# Patient Record
Sex: FEMALE | Race: WHITE | Employment: OTHER | URBAN - METROPOLITAN AREA
[De-identification: names, ages, dates, MRNs, and addresses within clinical notes are randomized per-mention and may not be internally consistent; named-entity substitution may affect disease eponyms.]

---

## 2018-03-27 ENCOUNTER — HOSPITAL ENCOUNTER (OUTPATIENT)
Dept: ULTRASOUND IMAGING | Age: 68
Discharge: HOME | End: 2018-03-27
Payer: MEDICARE

## 2018-03-27 ENCOUNTER — HOSPITAL ENCOUNTER (OUTPATIENT)
Dept: MAMMOGRAPHY | Age: 68
Discharge: HOME | End: 2018-03-29
Payer: MEDICARE

## 2018-03-27 DIAGNOSIS — R92.2 DENSE BREASTS: ICD-10-CM

## 2018-03-27 DIAGNOSIS — Z12.31 VISIT FOR SCREENING MAMMOGRAM: ICD-10-CM

## 2018-03-27 PROCEDURE — 77063 BREAST TOMOSYNTHESIS BI: CPT

## 2018-03-27 PROCEDURE — 76641 ULTRASOUND BREAST COMPLETE: CPT

## 2019-06-12 ENCOUNTER — HOSPITAL ENCOUNTER (OUTPATIENT)
Dept: MAMMOGRAPHY | Age: 69
Discharge: HOME | End: 2019-06-14
Payer: MEDICARE

## 2019-06-12 DIAGNOSIS — Z12.31 SCREENING MAMMOGRAM, ENCOUNTER FOR: ICD-10-CM

## 2019-06-12 PROCEDURE — 77067 SCR MAMMO BI INCL CAD: CPT

## 2019-09-16 ENCOUNTER — HOSPITAL ENCOUNTER (OUTPATIENT)
Dept: ULTRASOUND IMAGING | Age: 69
Discharge: HOME | End: 2019-09-16
Payer: MEDICARE

## 2019-09-16 DIAGNOSIS — K42.9 UMBILICAL HERNIA WITHOUT OBSTRUCTION AND WITHOUT GANGRENE: ICD-10-CM

## 2019-09-16 PROCEDURE — 76705 ECHO EXAM OF ABDOMEN: CPT

## 2019-09-24 ENCOUNTER — HOSPITAL ENCOUNTER (OUTPATIENT)
Age: 69
Discharge: HOME | End: 2019-09-24
Payer: MEDICARE

## 2019-09-24 LAB
ALBUMIN SERPL-MCNC: 4.7 G/DL (ref 3.5–5.2)
ALP BLD-CCNC: 91 U/L (ref 35–104)
ALT SERPL-CCNC: 22 U/L (ref 0–32)
ANION GAP SERPL CALCULATED.3IONS-SCNC: 12 MMOL/L (ref 7–16)
AST SERPL-CCNC: 18 U/L (ref 0–31)
BASOPHILS ABSOLUTE: 0.06 E9/L (ref 0–0.2)
BASOPHILS RELATIVE PERCENT: 1.1 % (ref 0–2)
BILIRUB SERPL-MCNC: 0.4 MG/DL (ref 0–1.2)
BUN BLDV-MCNC: 13 MG/DL (ref 8–23)
CALCIUM SERPL-MCNC: 9.4 MG/DL (ref 8.6–10.2)
CHLORIDE BLD-SCNC: 105 MMOL/L (ref 98–107)
CHOLESTEROL, TOTAL: 165 MG/DL (ref 0–199)
CO2: 27 MMOL/L (ref 22–29)
CREAT SERPL-MCNC: 0.8 MG/DL (ref 0.5–1)
EOSINOPHILS ABSOLUTE: 0.39 E9/L (ref 0.05–0.5)
EOSINOPHILS RELATIVE PERCENT: 7.1 % (ref 0–6)
GFR AFRICAN AMERICAN: >60
GFR NON-AFRICAN AMERICAN: >60 ML/MIN/1.73
GLUCOSE BLD-MCNC: 109 MG/DL (ref 74–99)
HCT VFR BLD CALC: 43.1 % (ref 34–48)
HDLC SERPL-MCNC: 56 MG/DL
HEMOGLOBIN: 13.8 G/DL (ref 11.5–15.5)
IMMATURE GRANULOCYTES #: 0.02 E9/L
IMMATURE GRANULOCYTES %: 0.4 % (ref 0–5)
LDL CHOLESTEROL CALCULATED: 93 MG/DL (ref 0–99)
LYMPHOCYTES ABSOLUTE: 1.31 E9/L (ref 1.5–4)
LYMPHOCYTES RELATIVE PERCENT: 23.7 % (ref 20–42)
MCH RBC QN AUTO: 29.7 PG (ref 26–35)
MCHC RBC AUTO-ENTMCNC: 32 % (ref 32–34.5)
MCV RBC AUTO: 92.7 FL (ref 80–99.9)
MONOCYTES ABSOLUTE: 0.48 E9/L (ref 0.1–0.95)
MONOCYTES RELATIVE PERCENT: 8.7 % (ref 2–12)
NEUTROPHILS ABSOLUTE: 3.27 E9/L (ref 1.8–7.3)
NEUTROPHILS RELATIVE PERCENT: 59 % (ref 43–80)
PDW BLD-RTO: 13.3 FL (ref 11.5–15)
PLATELET # BLD: 227 E9/L (ref 130–450)
PMV BLD AUTO: 11.6 FL (ref 7–12)
POTASSIUM SERPL-SCNC: 4.6 MMOL/L (ref 3.5–5)
RBC # BLD: 4.65 E12/L (ref 3.5–5.5)
SODIUM BLD-SCNC: 144 MMOL/L (ref 132–146)
TOTAL PROTEIN: 7.4 G/DL (ref 6.4–8.3)
TRIGL SERPL-MCNC: 80 MG/DL (ref 0–149)
TSH SERPL DL<=0.05 MIU/L-ACNC: 0.01 UIU/ML (ref 0.27–4.2)
VITAMIN D 25-HYDROXY: 31 NG/ML (ref 30–100)
VLDLC SERPL CALC-MCNC: 16 MG/DL
WBC # BLD: 5.5 E9/L (ref 4.5–11.5)

## 2019-09-24 PROCEDURE — 84443 ASSAY THYROID STIM HORMONE: CPT

## 2019-09-24 PROCEDURE — 82306 VITAMIN D 25 HYDROXY: CPT

## 2019-09-24 PROCEDURE — 85025 COMPLETE CBC W/AUTO DIFF WBC: CPT

## 2019-09-24 PROCEDURE — 80061 LIPID PANEL: CPT

## 2019-09-24 PROCEDURE — 80053 COMPREHEN METABOLIC PANEL: CPT

## 2019-09-24 PROCEDURE — 36415 COLL VENOUS BLD VENIPUNCTURE: CPT

## 2019-09-27 ENCOUNTER — HOSPITAL ENCOUNTER (OUTPATIENT)
Dept: CT IMAGING | Age: 69
Discharge: HOME | End: 2019-09-27
Payer: MEDICARE

## 2019-09-27 DIAGNOSIS — K46.9 NON-RECURRENT ABDOMINAL HERNIA WITHOUT OBSTRUCTION OR GANGRENE, UNSPECIFIED HERNIA TYPE: ICD-10-CM

## 2019-09-27 PROCEDURE — 6360000004 HC RX CONTRAST MEDICATION: Performed by: RADIOLOGY

## 2019-09-27 PROCEDURE — 74178 CT ABD&PLV WO CNTR FLWD CNTR: CPT

## 2019-09-27 RX ADMIN — IOPAMIDOL 80 ML: 755 INJECTION, SOLUTION INTRAVENOUS at 10:07

## 2019-09-27 RX ADMIN — IOHEXOL 50 ML: 240 INJECTION, SOLUTION INTRATHECAL; INTRAVASCULAR; INTRAVENOUS; ORAL at 10:06

## 2020-07-21 ENCOUNTER — HOSPITAL ENCOUNTER (OUTPATIENT)
Dept: MAMMOGRAPHY | Age: 70
Discharge: HOME | End: 2020-07-23
Payer: MEDICARE

## 2020-07-21 PROCEDURE — 77067 SCR MAMMO BI INCL CAD: CPT

## 2021-05-12 ENCOUNTER — TELEPHONE (OUTPATIENT)
Dept: SURGERY | Age: 71
End: 2021-05-12

## 2021-05-12 ENCOUNTER — OFFICE VISIT (OUTPATIENT)
Dept: SURGERY | Age: 71
End: 2021-05-12
Payer: MEDICARE

## 2021-05-12 VITALS
DIASTOLIC BLOOD PRESSURE: 89 MMHG | SYSTOLIC BLOOD PRESSURE: 147 MMHG | RESPIRATION RATE: 16 BRPM | WEIGHT: 167 LBS | TEMPERATURE: 97.8 F | BODY MASS INDEX: 26.21 KG/M2 | HEIGHT: 67 IN | HEART RATE: 68 BPM

## 2021-05-12 DIAGNOSIS — R19.4 CHANGE IN BOWEL HABITS: ICD-10-CM

## 2021-05-12 DIAGNOSIS — K21.9 GASTROESOPHAGEAL REFLUX DISEASE, UNSPECIFIED WHETHER ESOPHAGITIS PRESENT: Primary | ICD-10-CM

## 2021-05-12 PROCEDURE — 4004F PT TOBACCO SCREEN RCVD TLK: CPT | Performed by: SURGERY

## 2021-05-12 PROCEDURE — 4040F PNEUMOC VAC/ADMIN/RCVD: CPT | Performed by: SURGERY

## 2021-05-12 PROCEDURE — G8417 CALC BMI ABV UP PARAM F/U: HCPCS | Performed by: SURGERY

## 2021-05-12 PROCEDURE — 3017F COLORECTAL CA SCREEN DOC REV: CPT | Performed by: SURGERY

## 2021-05-12 PROCEDURE — G8427 DOCREV CUR MEDS BY ELIG CLIN: HCPCS | Performed by: SURGERY

## 2021-05-12 PROCEDURE — 1090F PRES/ABSN URINE INCON ASSESS: CPT | Performed by: SURGERY

## 2021-05-12 PROCEDURE — 99204 OFFICE O/P NEW MOD 45 MIN: CPT | Performed by: SURGERY

## 2021-05-12 PROCEDURE — 1123F ACP DISCUSS/DSCN MKR DOCD: CPT | Performed by: SURGERY

## 2021-05-12 PROCEDURE — G8400 PT W/DXA NO RESULTS DOC: HCPCS | Performed by: SURGERY

## 2021-05-12 RX ORDER — LORATADINE 10 MG/1
10 CAPSULE, LIQUID FILLED ORAL DAILY
COMMUNITY

## 2021-05-12 RX ORDER — LOSARTAN POTASSIUM 25 MG/1
TABLET ORAL
COMMUNITY
Start: 2021-03-29

## 2021-05-12 NOTE — PROGRESS NOTES
Patient's Name/Date of Birth: Tom Lack / 1950    Date: 5/12/2021    PCP: Attila Chen MD    Chief Complaint   Patient presents with    Colonoscopy     screening     Other     EGD consult        HPI:  Patient seen for evaluation of 2 problems: 1. GERD symptoms foe about one year duration. 2. Recent change in bowel habits with recent change in stool caliber, no rectal bleeding. Patient's medications, allergies, past medical, surgical, social and family histories were reviewed and updated as appropriate. Allergies   Allergen Reactions    Pcn [Penicillins]     Tape Bernell Colla Tape]        Past Medical History:   Diagnosis Date    [place] disease ([facility] [address]     Thyroid disease         Past Surgical History:   Procedure Laterality Date    COLONOSCOPY      THYROID SURGERY          Social History     Tobacco Use    Smoking status: Never Smoker    Smokeless tobacco: Never Used   Substance Use Topics    Alcohol use: Yes     Comment: socially       Current Outpatient Medications   Medication Sig Dispense Refill    losartan (COZAAR) 25 MG tablet take 1 tablet by mouth once daily      loratadine (CLARITIN) 10 MG capsule Take 10 mg by mouth daily      Coenzyme Q10 (COQ10) 100 MG CAPS Take by mouth      Ascorbic Acid (VITAMIN C) 500 MG tablet Take 500 mg by mouth daily      Pyridoxine HCl (VITAMIN B-6) 100 MG tablet Take 100 mg by mouth daily.  vitamin B-12 (CYANOCOBALAMIN) 100 MCG tablet Take 50 mcg by mouth daily.  vitamin E 1000 UNITS capsule Take 1,000 Units by mouth daily.  calcium-vitamin D (OSCAL) 250-125 MG-UNIT per tablet Take 1 tablet by mouth daily.  levothyroxine (SYNTHROID) 125 MCG tablet Take 125 mcg by mouth Daily        No current facility-administered medications for this visit.         Review of Systems  Constitutional: negative  Eyes: negative  Ears, nose, mouth, throat, and face: negative  Respiratory: negative  Cardiovascular: negative Gastrointestinal: negative  Genitourinary:negative  Integument/breast: negative  Hematologic/lymphatic: negative  Musculoskeletal:negative  Neurological: negative  Allergic/Immunologic: negative    Physical exam:  BP (!) 147/89   Pulse 68   Temp 97.8 °F (36.6 °C) (Temporal)   Resp 16   Ht 5' 6.5\" (1.689 m)   Wt 167 lb (75.8 kg)   BMI 26.55 kg/m²   General appearance: no acute distress  Head:NCAT, EOMI, PERRLA, conjunctiva pink  Neck: no masses, supple  Lungs: CTABL  Heart: RRR  Abdomen: soft, nondistended, nontender, no guarding, no peritoneal signs, normoactive bowel sounds  Extremities:no edema  Neuro exam: normal  Skin: no lesions, no rashes    Assessment/Plan:  Proceed with panendoscopy  The procedure risks, benfits, possible complications and alternative options where explained to the patient, she understands and agrees to proceed with surgery. .    No follow-ups on file.     Korina Olmos MD      Send copy of H&P to PCP, Kimberly Cordero MD

## 2021-05-12 NOTE — TELEPHONE ENCOUNTER
Prior Authorization Form:      DEMOGRAPHICS:                     Patient Name:  Shaun Meehan  Patient :  1950            Insurance:  Payor: MEDICARE / Plan: MEDICARE PART A AND B / Product Type: *No Product type* /   Insurance ID Number:    Payor/Plan Subscr  Sex Relation Sub. Ins. ID Effective Group Num   1. MEDICARE - [place]* 1950 Female Self [id number] 20                                    [address] [address]   2.  [facility]* 1950 Female Self [id number]                                   [address]         DIAGNOSIS & PROCEDURE:                       Procedure/Operation: EGD/Colonoscopy           CPT Code: 35576/79269    Diagnosis:  Change in bowel habits/GERD    ICD10 Code: K21.9/R19.4    Location:  [facility]    Surgeon:  Melvi Rodriguez    SCHEDULING INFORMATION:                          Date: 21    Time: 7:30              Anesthesia:  MAC/TIVA                                                       Status:  Outpatient        Special Comments:         Electronically signed by Meño Thomas MA on 2021 at 3:09 PM

## 2021-05-24 ENCOUNTER — HOSPITAL ENCOUNTER (OUTPATIENT)
Age: 71
Discharge: HOME | End: 2021-05-26
Payer: MEDICARE

## 2021-05-24 DIAGNOSIS — Z01.818 PREOP TESTING: ICD-10-CM

## 2021-05-24 PROCEDURE — U0003 INFECTIOUS AGENT DETECTION BY NUCLEIC ACID (DNA OR RNA); SEVERE ACUTE RESPIRATORY SYNDROME CORONAVIRUS 2 (SARS-COV-2) (CORONAVIRUS DISEASE [COVID-19]), AMPLIFIED PROBE TECHNIQUE, MAKING USE OF HIGH THROUGHPUT TECHNOLOGIES AS DESCRIBED BY CMS-2020-01-R: HCPCS

## 2021-05-24 PROCEDURE — U0005 INFEC AGEN DETEC AMPLI PROBE: HCPCS

## 2021-05-25 NOTE — PROGRESS NOTES

## 2021-05-25 NOTE — PROGRESS NOTES
[facility] PRE-ADMISSION TESTING INSTRUCTIONS      ARRIVAL INSTRUCTIONS:  [x] Parking the day of Surgery is located in the Main Entrance lot. Upon entering the main door make an immediate right to the surgery reception desk. [x] Bring photo ID and insurance card    [] Bring in a copy of Living will or Durable Power of  papers. [x] Please be sure to arrange for responsible adult to provide transportation to and from the hospital    [x] Please arrange for responsible adult to be with you for the 24 hour period post procedure due to having anesthesia      GENERAL INSTRUCTIONS:    [x] Nothing by mouth after midnight, including gum, candy, mints or water    [x] You may brush your teeth, but do not swallow any water    [] Take medications as instructed with 1-2 oz of water    [x] Stop herbal supplements and vitamins 5 days prior to procedure    [] Follow preop dosing of blood thinners per physician instructions    [] Take 1/2 dose of evening insulin, but no insulin after midnight    [] No oral diabetic medications after midnight    [] If diabetic and have low blood sugar or feel symptomatic, take 1-2oz apple juice only    [] Bring inhalers day of surgery    [] Bring C-PAP/ Bi-Pap day of surgery    [] Bring urine specimen day of surgery    [] Shower or bath with soap, lather and rinse well, AM of Surgery, no lotion, powders or creams to surgical site    [x] Follow bowel prep as instructed per surgeon    [] No tobacco products within 24 hours of surgery     [x] No alcohol or illegal drug use within 24 hours of surgery.     [x] Jewelry, body piercing's, eyeglasses, contact lenses and dentures are not permitted into surgery (bring cases)      [x] Please do not wear any nail polish, make up or hair products on the day of surgery    [x] You can expect a call the business day prior to procedure to notify you if your arrival time changes    [x] If you receive a survey after surgery we

## 2021-05-26 LAB
SARS-COV-2: NOT DETECTED
SOURCE: NORMAL

## 2021-05-28 ENCOUNTER — HOSPITAL ENCOUNTER (OUTPATIENT)
Age: 71
Setting detail: OUTPATIENT SURGERY
Discharge: HOME | End: 2021-05-28
Attending: SURGERY | Admitting: SURGERY
Payer: MEDICARE

## 2021-05-28 ENCOUNTER — ANESTHESIA EVENT (OUTPATIENT)
Dept: ENDOSCOPY | Age: 71
End: 2021-05-28
Payer: MEDICARE

## 2021-05-28 ENCOUNTER — ANESTHESIA (OUTPATIENT)
Dept: ENDOSCOPY | Age: 71
End: 2021-05-28
Payer: MEDICARE

## 2021-05-28 VITALS
HEART RATE: 71 BPM | RESPIRATION RATE: 16 BRPM | TEMPERATURE: 98.5 F | BODY MASS INDEX: 26.2 KG/M2 | SYSTOLIC BLOOD PRESSURE: 127 MMHG | WEIGHT: 163 LBS | DIASTOLIC BLOOD PRESSURE: 75 MMHG | HEIGHT: 66 IN | OXYGEN SATURATION: 95 %

## 2021-05-28 VITALS
DIASTOLIC BLOOD PRESSURE: 59 MMHG | SYSTOLIC BLOOD PRESSURE: 104 MMHG | RESPIRATION RATE: 20 BRPM | OXYGEN SATURATION: 91 %

## 2021-05-28 DIAGNOSIS — Z01.818 PREOP TESTING: Primary | ICD-10-CM

## 2021-05-28 PROCEDURE — 7100000011 HC PHASE II RECOVERY - ADDTL 15 MIN: Performed by: SURGERY

## 2021-05-28 PROCEDURE — 3700000001 HC ADD 15 MINUTES (ANESTHESIA): Performed by: SURGERY

## 2021-05-28 PROCEDURE — 45378 DIAGNOSTIC COLONOSCOPY: CPT | Performed by: SURGERY

## 2021-05-28 PROCEDURE — 88342 IMHCHEM/IMCYTCHM 1ST ANTB: CPT

## 2021-05-28 PROCEDURE — 3609027000 HC COLONOSCOPY: Performed by: SURGERY

## 2021-05-28 PROCEDURE — 7100000010 HC PHASE II RECOVERY - FIRST 15 MIN: Performed by: SURGERY

## 2021-05-28 PROCEDURE — 43239 EGD BIOPSY SINGLE/MULTIPLE: CPT | Performed by: SURGERY

## 2021-05-28 PROCEDURE — 3700000000 HC ANESTHESIA ATTENDED CARE: Performed by: SURGERY

## 2021-05-28 PROCEDURE — 2580000003 HC RX 258: Performed by: SURGERY

## 2021-05-28 PROCEDURE — 88305 TISSUE EXAM BY PATHOLOGIST: CPT

## 2021-05-28 PROCEDURE — 2709999900 HC NON-CHARGEABLE SUPPLY: Performed by: SURGERY

## 2021-05-28 PROCEDURE — 6360000002 HC RX W HCPCS: Performed by: NURSE ANESTHETIST, CERTIFIED REGISTERED

## 2021-05-28 PROCEDURE — 3609012400 HC EGD TRANSORAL BIOPSY SINGLE/MULTIPLE: Performed by: SURGERY

## 2021-05-28 RX ORDER — SODIUM CHLORIDE 0.9 % (FLUSH) 0.9 %
5-40 SYRINGE (ML) INJECTION PRN
Status: DISCONTINUED | OUTPATIENT
Start: 2021-05-28 | End: 2021-05-28 | Stop reason: HOSPADM

## 2021-05-28 RX ORDER — SODIUM CHLORIDE 0.9 % (FLUSH) 0.9 %
5-40 SYRINGE (ML) INJECTION EVERY 12 HOURS SCHEDULED
Status: DISCONTINUED | OUTPATIENT
Start: 2021-05-28 | End: 2021-05-28 | Stop reason: HOSPADM

## 2021-05-28 RX ORDER — SODIUM CHLORIDE 9 MG/ML
INJECTION, SOLUTION INTRAVENOUS CONTINUOUS
Status: DISCONTINUED | OUTPATIENT
Start: 2021-05-28 | End: 2021-05-28 | Stop reason: HOSPADM

## 2021-05-28 RX ORDER — SODIUM CHLORIDE 9 MG/ML
25 INJECTION, SOLUTION INTRAVENOUS PRN
Status: DISCONTINUED | OUTPATIENT
Start: 2021-05-28 | End: 2021-05-28 | Stop reason: HOSPADM

## 2021-05-28 RX ORDER — PROPOFOL 10 MG/ML
INJECTION, EMULSION INTRAVENOUS PRN
Status: DISCONTINUED | OUTPATIENT
Start: 2021-05-28 | End: 2021-05-28 | Stop reason: SDUPTHER

## 2021-05-28 RX ADMIN — PROPOFOL 440 MG: 10 INJECTION, EMULSION INTRAVENOUS at 07:37

## 2021-05-28 RX ADMIN — SODIUM CHLORIDE: 9 INJECTION, SOLUTION INTRAVENOUS at 07:23

## 2021-05-28 ASSESSMENT — PAIN - FUNCTIONAL ASSESSMENT: PAIN_FUNCTIONAL_ASSESSMENT: 0-10

## 2021-05-28 ASSESSMENT — PAIN SCALES - GENERAL
PAINLEVEL_OUTOF10: 0
PAINLEVEL_OUTOF10: 0

## 2021-05-28 NOTE — BRIEF OP NOTE
Brief Postoperative Note      Patient: Tyler Meehan  YOB: 1950  MRN: [id number]    Date of Procedure: 5/28/2021    Pre-Op Diagnosis: CHANGE IN BOWEL HABITS AND GERD    Post-Op Diagnosis: Same       Procedure(s):  EGD BIOPSY  COLONOSCOPY DIAGNOSTIC    Surgeon(s):  Mahi Garnica MD    Assistant:  * No surgical staff found *    Anesthesia: Monitor Anesthesia Care    Estimated Blood Loss (mL): Minimal    Complications: None    Specimens:   ID Type Source Tests Collected by Time Destination   A : r/o hpylori Tissue Stomach SURGICAL PATHOLOGY Mahi Garnica MD 5/28/2021 7644        Implants:  * No implants in log *      Drains: * No LDAs found *    Findings:     Electronically signed by Mahi Garnica MD on 5/28/2021 at 8:10 AM

## 2021-05-28 NOTE — OP NOTE
[address] [address]                                OPERATIVE REPORT    PATIENT NAME: Gabrielle JEFFERSON               :        1950  MED REC NO:   [id number]                            ROOM:  ACCOUNT NO:   [de-identified]                           ADMIT DATE: 2021  PROVIDER:     Korina Olmos MD    DATE OF PROCEDURE:  2021    PREOPERATIVE DIAGNOSES:  1. Heartburn. 2.  Recent change in bowel habits. POSTOPERATIVE DIAGNOSES:  1. Gastroesophageal reflux disease. 2.  Subcutaneous lipoma, antrum of the stomach. 3.  Sigmoid diverticulosis. PROCEDURE PERFORMED:  1. Esophagogastroduodenoscopy with antral biopsies. 2.  Total colonoscopy to cecum. ESTIMATED BLOOD LOSS:  Minimal.    DESCRIPTION OF PROCEDURE:  With the patient in the left lateral position  on the operating table, after adequate sedation was administered by  Anesthesia, a standard Olympus gastroscope was introduced through the  mouth and advanced into the esophagus. There was evidence of  gastroesophageal reflux. No esophageal ulcers, no stricture, no  Kamara's. GE junction was normal.  Stomach revealed normal gastric  folds. Fundus of the stomach was visualized by retroflexing the end of  the scope and was normal.  Body was normal as well. Antrum revealed a 1  cm subcutaneous bulge consistent with a subcutaneous lipoma along with  mild antral gastritis. Multiple biopsies were taken. Pyloric opening  was normal.  Visualization of the first, second, third, and fourth parts  of the duodenum showed no evidence of ulcers, bleeding, or other  pathology. Scope was slowly withdrawn and totally removed. The patient  tolerated the procedure well. Digital rectal examination and dilatation was performed, showed evidence  of good sphincter tone. There were no palpable masses and no blood on  the examining finger.   A pediatric Olympus colonoscope was introduced  through the anal opening and advanced into the rectosigmoid. The anus  and rectum were normal.  Sigmoid colon showed evidence of diverticulosis  without evidence of diverticulitis. Remainder of the left colon  visualized was normal.  Transverse colon, ascending colon, and cecum  were reached, visualized, and were normal.  Photos were taken. Scope  was slowly withdrawn. Further observation of the colon on the way out  revealed no other pathology. Scope was totally removed. The patient  tolerated the procedure well. RECOMMENDATIONS:  1. Antireflux precautions. 2.  High-fiber diet. 3.  Repeat colonoscopy in 10 years or earlier if indicated.         Susan Booth MD    D: 05/28/2021 8:28:22       T: 05/28/2021 8:38:36     [email or web address]  Job#: [id number]     Doc#: [id number]    CC:

## 2021-05-28 NOTE — ANESTHESIA PRE PROCEDURE
Department of Anesthesiology  Preprocedure Note       Name:  Heather Meehan   Age:  66 y.o.  :  1950                                          MRN:  [id number]         Date:  2021      Surgeon: Raphael Rai):  [place] Olivia MD    Procedure: Procedure(s):  EGD ESOPHAGOGASTRODUODENOSCOPY  COLONOSCOPY DIAGNOSTIC    Medications prior to admission:   Prior to Admission medications    Medication Sig Start Date End Date Taking? Authorizing Provider   losartan (COZAAR) 25 MG tablet take 1 tablet by mouth once daily 3/29/21  Yes Historical Provider, MD   loratadine (CLARITIN) 10 MG capsule Take 10 mg by mouth daily   Yes Historical Provider, MD   Coenzyme Q10 (COQ10) 100 MG CAPS Take by mouth   Yes Historical Provider, MD   Ascorbic Acid (VITAMIN C) 500 MG tablet Take 500 mg by mouth daily   Yes Historical Provider, MD   Pyridoxine HCl (VITAMIN B-6) 100 MG tablet Take 100 mg by mouth daily. Yes Historical Provider, MD   vitamin B-12 (CYANOCOBALAMIN) 100 MCG tablet Take 50 mcg by mouth daily. Yes Historical Provider, MD   vitamin E 1000 UNITS capsule Take 1,000 Units by mouth daily. Yes Historical Provider, MD   calcium-vitamin D (OSCAL) 250-125 MG-UNIT per tablet Take 1 tablet by mouth daily. Yes Historical Provider, MD   levothyroxine (SYNTHROID) 125 MCG tablet Take 125 mcg by mouth Daily    Yes Historical Provider, MD       Current medications:    Current Facility-Administered Medications   Medication Dose Route Frequency Provider Last Rate Last Admin    0.9 % sodium chloride infusion   Intravenous Continuous Florida Olivia MD        sodium chloride flush 0.9 % injection 5-40 mL  5-40 mL Intravenous 2 times per day Florida Olivia MD        sodium chloride flush 0.9 % injection 5-40 mL  5-40 mL Intravenous PRN Florida Olivia MD        0.9 % sodium chloride infusion  25 mL Intravenous PRN Florida Olivia MD           Allergies:     Allergies   Allergen Reactions    Pcn [Penicillins] Swelling    Tape Kj Dryer Tape] Rash       Problem List:  There is no problem list on file for this patient. Past Medical History:        Diagnosis Date    Colon cancer screening     GERD (gastroesophageal reflux disease)     [place] disease ([address]     Thyroid disease        Past Surgical History:        Procedure Laterality Date    APPENDECTOMY      COLONOSCOPY      HERNIA REPAIR      HYSTERECTOMY      THYROID SURGERY         Social History:    Social History     Tobacco Use    Smoking status: Never Smoker    Smokeless tobacco: Never Used   Substance Use Topics    Alcohol use: Yes     Comment: socially                                Counseling given: Not Answered      Vital Signs (Current):   Vitals:    05/25/21 0823 05/28/21 0649   BP:  (!) 161/83   Pulse:  85   Resp:  16   Temp:  98.5 °F (36.9 °C)   TempSrc:  Temporal   SpO2:  98%   Weight: 163 lb (73.9 kg)    Height: 5' 6\" (1.676 m)                                               BP Readings from Last 3 Encounters:   05/28/21 (!) 161/83   05/12/21 (!) 147/89   09/09/17 (!) 140/85       NPO Status:                                                                                 BMI:   Wt Readings from Last 3 Encounters:   05/25/21 163 lb (73.9 kg)   05/12/21 167 lb (75.8 kg)   09/09/17 158 lb (71.7 kg)     Body mass index is 26.31 kg/m².     CBC:   Lab Results   Component Value Date    WBC 5.5 09/24/2019    RBC 4.65 09/24/2019    HGB 13.8 09/24/2019    HCT 43.1 09/24/2019    MCV 92.7 09/24/2019    RDW 13.3 09/24/2019     09/24/2019       CMP:   Lab Results   Component Value Date     09/24/2019    K 4.6 09/24/2019     09/24/2019    CO2 27 09/24/2019    BUN 13 09/24/2019    CREATININE 0.8 09/24/2019    GFRAA >60 09/24/2019    LABGLOM >60 09/24/2019    GLUCOSE 109 09/24/2019    PROT 7.4 09/24/2019    CALCIUM 9.4 09/24/2019    BILITOT 0.4 09/24/2019    ALKPHOS 91 09/24/2019    AST 18 09/24/2019    ALT 22 09/24/2019       POC Tests: No results for input(s): POCGLU, POCNA, POCK, POCCL, POCBUN, POCHEMO, POCHCT in the last 72 hours. Coags: No results found for: PROTIME, INR, APTT    HCG (If Applicable): No results found for: PREGTESTUR, PREGSERUM, HCG, HCGQUANT     ABGs: No results found for: PHART, PO2ART, BUU7DZU, UEO5JQJ, BEART, T1MKBHGJ     Type & Screen (If Applicable):  No results found for: LABABO, LABRH    Drug/Infectious Status (If Applicable):  No results found for: HIV, HEPCAB    COVID-19 Screening (If Applicable):   Lab Results   Component Value Date    COVID19 Not Detected 05/24/2021           Anesthesia Evaluation  Patient summary reviewed no history of anesthetic complications:   Airway: Mallampati: II  TM distance: >3 FB   Neck ROM: full  Mouth opening: > = 3 FB Dental:    (+) upper dentures and lower dentures      Pulmonary:Negative Pulmonary ROS breath sounds clear to auscultation                             Cardiovascular:Negative CV ROS            Rhythm: regular  Rate: normal                    Neuro/Psych:      (-) CVA            ROS comment: [place] disease GI/Hepatic/Renal:   (+) GERD:, bowel prep,           Endo/Other:    (+) hypothyroidism::., .    (-) diabetes mellitus               Abdominal:           Vascular: negative vascular ROS. Anesthesia Plan      MAC     ASA 3       Induction: intravenous. MIPS: Prophylactic antiemetics administered. Anesthetic plan and risks discussed with patient and child/children. Plan discussed with CRNA.                   Rachel Henley MD   5/28/2021

## 2021-05-28 NOTE — H&P
Mahi Garnica MD   Physician   Specialty:  General Surgery   Progress Notes   Signed   Encounter Date:  5/12/2021               Signed                Patient's Name/Date of Birth: Palma Mitchell / 1950     Date: 5/12/2021     PCP: Kelly Natarajan MD          Chief Complaint   Patient presents with    Colonoscopy       screening     Other       EGD consult          HPI:  Patient seen for evaluation of 2 problems: 1. GERD symptoms foe about one year duration. 2. Recent change in bowel habits with recent change in stool caliber, no rectal bleeding.     Patient's medications, allergies, past medical, surgical, social and family histories were reviewed and updated as appropriate.          Allergies   Allergen Reactions    Pcn [Penicillins]      Tape Francisco Burger Tape]                Past Medical History:   Diagnosis Date    [place] disease ([facility] [address]      Thyroid disease                 Past Surgical History:   Procedure Laterality Date    COLONOSCOPY        THYROID SURGERY             Social History            Tobacco Use    Smoking status: Never Smoker    Smokeless tobacco: Never Used   Substance Use Topics    Alcohol use:  Yes       Comment: socially                Current Outpatient Medications   Medication Sig Dispense Refill    losartan (COZAAR) 25 MG tablet take 1 tablet by mouth once daily        loratadine (CLARITIN) 10 MG capsule Take 10 mg by mouth daily        Coenzyme Q10 (COQ10) 100 MG CAPS Take by mouth        Ascorbic Acid (VITAMIN C) 500 MG tablet Take 500 mg by mouth daily        Pyridoxine HCl (VITAMIN B-6) 100 MG tablet Take 100 mg by mouth daily.        vitamin B-12 (CYANOCOBALAMIN) 100 MCG tablet Take 50 mcg by mouth daily.        vitamin E 1000 UNITS capsule Take 1,000 Units by mouth daily.        calcium-vitamin D (OSCAL) 250-125 MG-UNIT per tablet Take 1 tablet by mouth daily.        levothyroxine (SYNTHROID) 125 MCG tablet Take 125 mcg by mouth Daily           No current facility-administered medications for this visit.          Review of Systems  Constitutional: negative  Eyes: negative  Ears, nose, mouth, throat, and face: negative  Respiratory: negative  Cardiovascular: negative  Gastrointestinal: negative  Genitourinary:negative  Integument/breast: negative  Hematologic/lymphatic: negative  Musculoskeletal:negative  Neurological: negative  Allergic/Immunologic: negative     Physical exam:  BP (!) 147/89   Pulse 68   Temp 97.8 °F (36.6 °C) (Temporal)   Resp 16   Ht 5' 6.5\" (1.689 m)   Wt 167 lb (75.8 kg)   BMI 26.55 kg/m²   General appearance: no acute distress  Head:NCAT, EOMI, PERRLA, conjunctiva pink  Neck: no masses, supple  Lungs: CTABL  Heart: RRR  Abdomen: soft, nondistended, nontender, no guarding, no peritoneal signs, normoactive bowel sounds  Extremities:no edema  Neuro exam: normal  Skin: no lesions, no rashes     Assessment/Plan:  Proceed with panendoscopy  The procedure risks, benfits, possible complications and alternative options where explained to the patient, she understands and agrees to proceed with surgery. .     No follow-ups on file.  Hood Zelaya MD        Send copy of H&P to PCP, Rolly Armendariz MD                      Office Visit on 5/12/2021     Office Visit on 5/12/2021        Detailed Report      Note shared with patient  Progress Notes Info    Author Note Status Last Update User   Marie Gonzalez MD Signed Marie Gonzalez MD   Last Update Date/Time: 5/12/2021 12:45 PM   Chart Review Routing History  Patient's History and Physical was reviewed. Patient examined. There has been no change. Yojana Fountain

## 2021-05-28 NOTE — ANESTHESIA POSTPROCEDURE EVALUATION
Department of Anesthesiology  Postprocedure Note    Patient: Noris Joe  MRN: [id number]  YOB: 1950  Date of evaluation: 5/28/2021  Time:  9:42 AM     Procedure Summary     Date: 05/28/21 Room / Location: [facility] / [facility]    Anesthesia Start: 4175 Anesthesia Stop: 0810    Procedures:       EGD BIOPSY (N/A )      COLONOSCOPY DIAGNOSTIC (N/A ) Diagnosis: (CHANGE IN BOWEL HABITS AND GERD)    Surgeons: Rodney Pina MD Responsible Provider: Yovani Mistry MD    Anesthesia Type: MAC ASA Status: 3          Anesthesia Type: MAC    Rl Phase I: Rl Score: 10    Rl Phase II: Rl Score: 10    Last vitals: Reviewed and per EMR flowsheets.        Anesthesia Post Evaluation    Patient location during evaluation: PACU  Patient participation: complete - patient participated  Level of consciousness: awake and alert  Airway patency: patent  Nausea & Vomiting: no vomiting and no nausea  Complications: no  Cardiovascular status: hemodynamically stable  Respiratory status: acceptable  Hydration status: stable

## 2021-06-04 ENCOUNTER — TELEPHONE (OUTPATIENT)
Dept: SURGERY | Age: 71
End: 2021-06-04

## 2021-06-04 NOTE — TELEPHONE ENCOUNTER
Pt given results of egd/colon per Dr Taryn Troncoso. No follow up needed, 10 year recall for colonoscopy.

## 2021-08-29 VITALS
RESPIRATION RATE: 20 BRPM | SYSTOLIC BLOOD PRESSURE: 181 MMHG | TEMPERATURE: 98 F | WEIGHT: 163 LBS | HEART RATE: 69 BPM | BODY MASS INDEX: 26.2 KG/M2 | DIASTOLIC BLOOD PRESSURE: 90 MMHG | HEIGHT: 66 IN | OXYGEN SATURATION: 98 %

## 2021-08-29 PROCEDURE — 99282 EMERGENCY DEPT VISIT SF MDM: CPT

## 2021-08-30 ENCOUNTER — HOSPITAL ENCOUNTER (EMERGENCY)
Age: 71
Discharge: HOME | End: 2021-08-30
Attending: EMERGENCY MEDICINE
Payer: MEDICARE

## 2021-08-30 DIAGNOSIS — J06.9 UPPER RESPIRATORY TRACT INFECTION, UNSPECIFIED TYPE: Primary | ICD-10-CM

## 2021-08-30 LAB — SARS-COV-2, PCR: DETECTED

## 2021-08-30 PROCEDURE — U0005 INFEC AGEN DETEC AMPLI PROBE: HCPCS

## 2021-08-30 PROCEDURE — U0003 INFECTIOUS AGENT DETECTION BY NUCLEIC ACID (DNA OR RNA); SEVERE ACUTE RESPIRATORY SYNDROME CORONAVIRUS 2 (SARS-COV-2) (CORONAVIRUS DISEASE [COVID-19]), AMPLIFIED PROBE TECHNIQUE, MAKING USE OF HIGH THROUGHPUT TECHNOLOGIES AS DESCRIBED BY CMS-2020-01-R: HCPCS

## 2021-08-30 RX ORDER — GUAIFENESIN/DEXTROMETHORPHAN 100-10MG/5
5 SYRUP ORAL 3 TIMES DAILY PRN
Qty: 120 ML | Refills: 0 | Status: SHIPPED | OUTPATIENT
Start: 2021-08-30 | End: 2021-09-09

## 2021-08-30 ASSESSMENT — ENCOUNTER SYMPTOMS
ABDOMINAL DISTENTION: 0
EYE DISCHARGE: 0
VOMITING: 0
EYE PAIN: 0
COUGH: 1
SORE THROAT: 0
EYE REDNESS: 0
SINUS PRESSURE: 0
BACK PAIN: 0
DIARRHEA: 0
SHORTNESS OF BREATH: 0
NAUSEA: 0
WHEEZING: 0

## 2021-08-30 NOTE — ED PROVIDER NOTES
Patient is a 67 y/o female who presents to the ED. Patient states \"I want a COVID test.\" She states that she has had a cough and congestion for the past two weeks. She denies any fever. She denies any shortness of breath. She denies any other complaints at this time. Review of Systems   Constitutional: Negative for chills and fever. HENT: Positive for congestion. Negative for ear pain, sinus pressure and sore throat. Eyes: Negative for pain, discharge and redness. Respiratory: Positive for cough. Negative for shortness of breath and wheezing. Cardiovascular: Negative for chest pain. Gastrointestinal: Negative for abdominal distention, diarrhea, nausea and vomiting. Genitourinary: Negative for dysuria and frequency. Musculoskeletal: Negative for arthralgias and back pain. Skin: Negative for rash and wound. Neurological: Negative for weakness and headaches. Hematological: Negative for adenopathy. All other systems reviewed and are negative. Physical Exam  Vitals and nursing note reviewed. HENT:      Head: Normocephalic. Right Ear: External ear normal.      Left Ear: External ear normal.      Nose: Nose normal.      Mouth/Throat:      Mouth: Mucous membranes are moist.   Eyes:      Conjunctiva/sclera: Conjunctivae normal.      Pupils: Pupils are equal, round, and reactive to light. Cardiovascular:      Rate and Rhythm: Normal rate and regular rhythm. Heart sounds: No murmur heard. Pulmonary:      Effort: Pulmonary effort is normal. No respiratory distress. Breath sounds: Normal breath sounds. No stridor. No wheezing, rhonchi or rales. Abdominal:      General: Bowel sounds are normal. There is no distension. Palpations: Abdomen is soft. Tenderness: There is no abdominal tenderness. There is no guarding. Musculoskeletal:         General: Normal range of motion. Cervical back: Normal range of motion and neck supple.    Skin:     General: Skin is warm and dry. Findings: No rash. Neurological:      Mental Status: She is alert and oriented to person, place, and time. 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 Past Surgical History:  has a past surgical history that includes Thyroid surgery; Colonoscopy; Appendectomy; Hysterectomy; hernia repair; Upper gastrointestinal endoscopy (N/A, 5/28/2021); and Colonoscopy (N/A, 5/28/2021). Social History:  reports that she has never smoked. She has never used smokeless tobacco. She reports current alcohol use. She reports that she does not use drugs. Family History: family history includes Heart Disease in her father; High Blood Pressure in her father; Other in her brother and mother; Stroke in her father. The patients home medications have been reviewed. Allergies: Pcn [penicillins] and Tape [adhesive tape]    -------------------------------------------------- RESULTS -------------------------------------------------  Labs:  No results found for this visit on 08/30/21. Radiology:  No orders to display       ------------------------- NURSING NOTES AND VITALS REVIEWED ---------------------------  Date / Time Roomed:  8/30/2021 12:34 AM  ED Bed Assignment:  25/25    The nursing notes within the ED encounter and vital signs as below have been reviewed.    BP (!) 181/90   Pulse 69   Temp 98 °F (36.7 °C) (Oral)   Resp 20   Ht 5' 6\" (1.676 m)   Wt 163 lb (73.9 kg)   SpO2 98%   BMI 26.31 kg/m²   Oxygen Saturation Interpretation: Normal      ------------------------------------------ PROGRESS NOTES ------------------------------------------  I have spoken with the patient and discussed todays results, in addition to providing specific details for the plan of care and counseling regarding the diagnosis and prognosis. Their questions are answered at this time and they are agreeable with the plan. I discussed at length with them reasons for immediate return here for re evaluation. They will followup with primary care by calling their office tomorrow. --------------------------------- ADDITIONAL PROVIDER NOTES ---------------------------------  At this time the patient is without objective evidence of an acute process requiring hospitalization or inpatient management. They have remained hemodynamically stable throughout their entire ED visit and are stable for discharge with outpatient follow-up. The plan has been discussed in detail and they are aware of the specific conditions for emergent return, as well as the importance of follow-up. New Prescriptions    GUAIFENESIN-DEXTROMETHORPHAN (ROBITUSSIN DM) 100-10 MG/5ML SYRUP    Take 5 mLs by mouth 3 times daily as needed for Cough       Diagnosis:  1. Upper respiratory tract infection, unspecified type        Disposition:  Patient's disposition: Discharge to home  Patient's condition is stable.          Rajni Ramos,   08/30/21 [id number]

## 2021-08-30 NOTE — ED NOTES
Pt stated \"I would like a Covid test and a prescription for my chest congestion. \" The Covid testing process was explained to the pt, who was visibly and audibly displeased with the wait time for the send out test. Pt requesting the rapid test, despite receiving an explanation of the reasons the ED cannot perform rapid tests on all patients.       Sandy Adame, JORDIN  08/30/21 [id number]

## 2021-08-30 NOTE — ED NOTES
Pt pulled back and moved RN's hand while this RN was attempting to obtain nasal Covid specimen. Pt reports the test was done incorrectly and would like the ED to call her in three days to give her results, but feels the results will be inaccurate. Pt was informed she would receive a call if the result is positive. Pt dissatisfied with the call back procedure and would like to be called regardless of result. Pt was encouraged to call her PCP to obtain her results if she did not receive a call for the positive result. Pt also reports her prescription will likely not work and is not what she requested. Pt was advised the doctor prescribed what was appropriate for her complaint.      Jill Vargas RN  08/30/21 [id number]

## 2021-08-31 ENCOUNTER — CARE COORDINATION (OUTPATIENT)
Dept: CARE COORDINATION | Age: 71
End: 2021-08-31

## 2021-08-31 NOTE — CARE COORDINATION
Date/Time:  2021 12:36 PM  Attempted to reach patient by telephone. Call within 2 business days of discharge: Yes Left HIPAA compliant message requesting a return call. Will attempt to reach patient again. Marie returned ACM's call:    Patient contacted regarding COVID-19 diagnosis. Discussed COVID-19 related testing which was available at this time. Test results were positive. Patient informed of results, if available? Yes. Ambulatory Care Manager contacted the patient by telephone to perform post discharge assessment. Call within 2 business days of discharge: Yes. Verified name and  with patient as identifiers. Provided introduction to self, and explanation of the CTN/ACM role, and reason for call due to risk factors for infection and/or exposure to COVID-19. Symptoms reviewed with patient who verbalized the following symptoms:  Congestion, cough, no new symptoms and no worsening symptoms. Due to no new or worsening symptoms encounter was not routed to provider for escalation. Discussed follow-up appointments. If no appointment was previously scheduled, appointment scheduling offered: Yes. [facility] follow up appointment(s): No future appointments. Non-[facility] follow up appointment(s): na    Non-face-to-face services provided:  Obtained and reviewed discharge summary and/or continuity of care documents     Advance Care Planning:   Does patient have an Advance Directive:  reviewed and current. Educated patient about risk for severe COVID-19 due to risk factors according to CDC guidelines. ACM reviewed discharge instructions, medical action plan and red flag symptoms with the patient who verbalized understanding. Discussed COVID vaccination status: Yes. Education provided on COVID-19 vaccination as appropriate. Discussed exposure protocols and quarantine with CDC Guidelines.  Patient was given an opportunity to verbalize any questions and concerns and agrees to contact ACM or health care provider for questions related to their healthcare. Reviewed and educated patient on any new and changed medications related to discharge diagnosis     Was patient discharged with a pulse oximeter? No   ACM provided contact information. Plan for follow-up call in 5-7 days based on severity of symptoms and risk factors.

## 2021-09-16 ENCOUNTER — CARE COORDINATION (OUTPATIENT)
Dept: CARE COORDINATION | Age: 71
End: 2021-09-16

## 2021-09-16 NOTE — CARE COORDINATION
Attempted to reach patient by telephone. Left HIPAA compliant message requesting a return call. No further outreach scheduled.

## 2022-03-21 ENCOUNTER — HOSPITAL ENCOUNTER (OUTPATIENT)
Dept: MAMMOGRAPHY | Age: 72
Discharge: HOME | End: 2022-03-23
Payer: MEDICARE

## 2022-03-21 VITALS — BODY MASS INDEX: 26.2 KG/M2 | HEIGHT: 66 IN | WEIGHT: 163 LBS

## 2022-03-21 DIAGNOSIS — Z12.31 ENCOUNTER FOR SCREENING MAMMOGRAM FOR MALIGNANT NEOPLASM OF BREAST: ICD-10-CM

## 2022-03-21 PROCEDURE — 77063 BREAST TOMOSYNTHESIS BI: CPT

## 2022-06-14 RX ORDER — LEVOTHYROXINE SODIUM 0.12 MG/1
TABLET ORAL
Qty: 90 TABLET | Refills: 1 | Status: SHIPPED | OUTPATIENT
Start: 2022-06-14

## 2022-07-21 RX ORDER — HYDROCHLOROTHIAZIDE 25 MG/1
TABLET ORAL
Qty: 90 TABLET | Refills: 1 | Status: SHIPPED | OUTPATIENT
Start: 2022-07-21

## 2022-12-12 RX ORDER — LEVOTHYROXINE SODIUM 0.12 MG/1
TABLET ORAL
Qty: 90 TABLET | Refills: 0 | Status: SHIPPED | OUTPATIENT
Start: 2022-12-12 | End: 2023-01-11

## 2022-12-12 NOTE — TELEPHONE ENCOUNTER
Has not had an appointment for a long time would you like to give her 30 days and make an appointment?

## 2023-01-16 RX ORDER — HYDROCHLOROTHIAZIDE 25 MG/1
TABLET ORAL
Qty: 90 TABLET | Refills: 1 | Status: SHIPPED | OUTPATIENT
Start: 2023-01-16

## 2023-02-02 ENCOUNTER — OFFICE VISIT (OUTPATIENT)
Dept: PRIMARY CARE CLINIC | Age: 73
End: 2023-02-02
Payer: MEDICARE

## 2023-02-02 VITALS
DIASTOLIC BLOOD PRESSURE: 92 MMHG | HEART RATE: 89 BPM | TEMPERATURE: 98 F | HEIGHT: 66 IN | BODY MASS INDEX: 25.55 KG/M2 | SYSTOLIC BLOOD PRESSURE: 136 MMHG | OXYGEN SATURATION: 97 % | WEIGHT: 159 LBS

## 2023-02-02 DIAGNOSIS — E03.9 HYPOTHYROIDISM, UNSPECIFIED TYPE: ICD-10-CM

## 2023-02-02 DIAGNOSIS — I10 PRIMARY HYPERTENSION: Primary | ICD-10-CM

## 2023-02-02 DIAGNOSIS — I10 PRIMARY HYPERTENSION: ICD-10-CM

## 2023-02-02 DIAGNOSIS — Z13.220 ENCOUNTER FOR LIPID SCREENING FOR CARDIOVASCULAR DISEASE: ICD-10-CM

## 2023-02-02 DIAGNOSIS — G10 HUNTINGTON'S DISEASE (HCC): ICD-10-CM

## 2023-02-02 DIAGNOSIS — Z13.6 ENCOUNTER FOR LIPID SCREENING FOR CARDIOVASCULAR DISEASE: ICD-10-CM

## 2023-02-02 LAB
ALBUMIN SERPL-MCNC: 4.8 G/DL (ref 3.5–5.2)
ALP BLD-CCNC: 73 U/L (ref 35–104)
ALT SERPL-CCNC: 21 U/L (ref 0–32)
ANION GAP SERPL CALCULATED.3IONS-SCNC: 20 MMOL/L (ref 7–16)
AST SERPL-CCNC: 23 U/L (ref 0–31)
BASOPHILS ABSOLUTE: 0.06 E9/L (ref 0–0.2)
BASOPHILS RELATIVE PERCENT: 0.9 % (ref 0–2)
BILIRUB SERPL-MCNC: 0.3 MG/DL (ref 0–1.2)
BUN BLDV-MCNC: 20 MG/DL (ref 6–23)
CALCIUM SERPL-MCNC: 10.3 MG/DL (ref 8.6–10.2)
CHLORIDE BLD-SCNC: 101 MMOL/L (ref 98–107)
CHOLESTEROL, TOTAL: 263 MG/DL (ref 0–199)
CO2: 23 MMOL/L (ref 22–29)
CREAT SERPL-MCNC: 0.9 MG/DL (ref 0.5–1)
EOSINOPHILS ABSOLUTE: 0.17 E9/L (ref 0.05–0.5)
EOSINOPHILS RELATIVE PERCENT: 2.4 % (ref 0–6)
GFR SERPL CREATININE-BSD FRML MDRD: >60 ML/MIN/1.73
GLUCOSE BLD-MCNC: 94 MG/DL (ref 74–99)
HCT VFR BLD CALC: 39.7 % (ref 34–48)
HDLC SERPL-MCNC: 45 MG/DL
HEMOGLOBIN: 12.8 G/DL (ref 11.5–15.5)
IMMATURE GRANULOCYTES #: 0.02 E9/L
IMMATURE GRANULOCYTES %: 0.3 % (ref 0–5)
LDL CHOLESTEROL CALCULATED: 183 MG/DL (ref 0–99)
LYMPHOCYTES ABSOLUTE: 1.44 E9/L (ref 1.5–4)
LYMPHOCYTES RELATIVE PERCENT: 20.6 % (ref 20–42)
MCH RBC QN AUTO: 29.6 PG (ref 26–35)
MCHC RBC AUTO-ENTMCNC: 32.2 % (ref 32–34.5)
MCV RBC AUTO: 91.7 FL (ref 80–99.9)
MONOCYTES ABSOLUTE: 0.8 E9/L (ref 0.1–0.95)
MONOCYTES RELATIVE PERCENT: 11.4 % (ref 2–12)
NEUTROPHILS ABSOLUTE: 4.5 E9/L (ref 1.8–7.3)
NEUTROPHILS RELATIVE PERCENT: 64.4 % (ref 43–80)
PDW BLD-RTO: 13.5 FL (ref 11.5–15)
PLATELET # BLD: 236 E9/L (ref 130–450)
PMV BLD AUTO: 12.3 FL (ref 7–12)
POTASSIUM SERPL-SCNC: 4.1 MMOL/L (ref 3.5–5)
RBC # BLD: 4.33 E12/L (ref 3.5–5.5)
SODIUM BLD-SCNC: 144 MMOL/L (ref 132–146)
T4 FREE: 1.96 NG/DL (ref 0.93–1.7)
TOTAL PROTEIN: 7.4 G/DL (ref 6.4–8.3)
TRIGL SERPL-MCNC: 173 MG/DL (ref 0–149)
TSH SERPL DL<=0.05 MIU/L-ACNC: 0.01 UIU/ML (ref 0.27–4.2)
VLDLC SERPL CALC-MCNC: 35 MG/DL
WBC # BLD: 7 E9/L (ref 4.5–11.5)

## 2023-02-02 PROCEDURE — G8484 FLU IMMUNIZE NO ADMIN: HCPCS | Performed by: STUDENT IN AN ORGANIZED HEALTH CARE EDUCATION/TRAINING PROGRAM

## 2023-02-02 PROCEDURE — G8427 DOCREV CUR MEDS BY ELIG CLIN: HCPCS | Performed by: STUDENT IN AN ORGANIZED HEALTH CARE EDUCATION/TRAINING PROGRAM

## 2023-02-02 PROCEDURE — G8417 CALC BMI ABV UP PARAM F/U: HCPCS | Performed by: STUDENT IN AN ORGANIZED HEALTH CARE EDUCATION/TRAINING PROGRAM

## 2023-02-02 PROCEDURE — 3017F COLORECTAL CA SCREEN DOC REV: CPT | Performed by: STUDENT IN AN ORGANIZED HEALTH CARE EDUCATION/TRAINING PROGRAM

## 2023-02-02 PROCEDURE — 1090F PRES/ABSN URINE INCON ASSESS: CPT | Performed by: STUDENT IN AN ORGANIZED HEALTH CARE EDUCATION/TRAINING PROGRAM

## 2023-02-02 PROCEDURE — 3288F FALL RISK ASSESSMENT DOCD: CPT | Performed by: STUDENT IN AN ORGANIZED HEALTH CARE EDUCATION/TRAINING PROGRAM

## 2023-02-02 PROCEDURE — 99213 OFFICE O/P EST LOW 20 MIN: CPT | Performed by: STUDENT IN AN ORGANIZED HEALTH CARE EDUCATION/TRAINING PROGRAM

## 2023-02-02 PROCEDURE — 3075F SYST BP GE 130 - 139MM HG: CPT | Performed by: STUDENT IN AN ORGANIZED HEALTH CARE EDUCATION/TRAINING PROGRAM

## 2023-02-02 PROCEDURE — 3080F DIAST BP >= 90 MM HG: CPT | Performed by: STUDENT IN AN ORGANIZED HEALTH CARE EDUCATION/TRAINING PROGRAM

## 2023-02-02 PROCEDURE — G8400 PT W/DXA NO RESULTS DOC: HCPCS | Performed by: STUDENT IN AN ORGANIZED HEALTH CARE EDUCATION/TRAINING PROGRAM

## 2023-02-02 PROCEDURE — 1123F ACP DISCUSS/DSCN MKR DOCD: CPT | Performed by: STUDENT IN AN ORGANIZED HEALTH CARE EDUCATION/TRAINING PROGRAM

## 2023-02-02 PROCEDURE — 1036F TOBACCO NON-USER: CPT | Performed by: STUDENT IN AN ORGANIZED HEALTH CARE EDUCATION/TRAINING PROGRAM

## 2023-02-02 SDOH — ECONOMIC STABILITY: FOOD INSECURITY: WITHIN THE PAST 12 MONTHS, YOU WORRIED THAT YOUR FOOD WOULD RUN OUT BEFORE YOU GOT MONEY TO BUY MORE.: NEVER TRUE

## 2023-02-02 SDOH — ECONOMIC STABILITY: INCOME INSECURITY: HOW HARD IS IT FOR YOU TO PAY FOR THE VERY BASICS LIKE FOOD, HOUSING, MEDICAL CARE, AND HEATING?: NOT HARD AT ALL

## 2023-02-02 SDOH — ECONOMIC STABILITY: FOOD INSECURITY: WITHIN THE PAST 12 MONTHS, THE FOOD YOU BOUGHT JUST DIDN'T LAST AND YOU DIDN'T HAVE MONEY TO GET MORE.: NEVER TRUE

## 2023-02-02 SDOH — ECONOMIC STABILITY: HOUSING INSECURITY
IN THE LAST 12 MONTHS, WAS THERE A TIME WHEN YOU DID NOT HAVE A STEADY PLACE TO SLEEP OR SLEPT IN A SHELTER (INCLUDING NOW)?: NO

## 2023-02-02 ASSESSMENT — PATIENT HEALTH QUESTIONNAIRE - PHQ9
SUM OF ALL RESPONSES TO PHQ9 QUESTIONS 1 & 2: 0
SUM OF ALL RESPONSES TO PHQ QUESTIONS 1-9: 0
2. FEELING DOWN, DEPRESSED OR HOPELESS: 0
SUM OF ALL RESPONSES TO PHQ QUESTIONS 1-9: 0
SUM OF ALL RESPONSES TO PHQ QUESTIONS 1-9: 0
1. LITTLE INTEREST OR PLEASURE IN DOING THINGS: 0
SUM OF ALL RESPONSES TO PHQ QUESTIONS 1-9: 0

## 2023-02-02 ASSESSMENT — ENCOUNTER SYMPTOMS
GASTROINTESTINAL NEGATIVE: 1
RESPIRATORY NEGATIVE: 1

## 2023-02-02 NOTE — PROGRESS NOTES
TSH suppressed and T4 elevated so will decrease synthroid to 112mcg and repeat labs in 6 weeks; also have recommended to patient we start cholesterol medication given her cholesterol levels  Marie Meehan (:  1950) is a 74 y.o. female,Established patient, here for evaluation of the following chief complaint(s):  New Patient (She is going see a specialist about spot on her lip that might be cancer.), Medication Refill (She all ready got refills.), and Hypertension (She states that he BP dose run  high when going to doctors. )         ASSESSMENT/PLAN:  1. Primary hypertension  -     CBC with Auto Differential; Future  -     Comprehensive Metabolic Panel; Future  2. Hypothyroidism, unspecified type  -     TSH; Future  -     T4, Free; Future  3. 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 Subjective   SUBJECTIVE/OBJECTIVE:  HPI    Patient is a 73 y/o F with a PMHx of HTN, Hunington's disease and hypothyroidism who presents for follow up. Patient of Dr. Hilary Banerjee. HTN-BP slightly elevated today in office but checks it at home and readings are at goal; denies any chest pain, headaches, vision changes    Hypothyroidism-has been stable on synthroid 125 mcg; due for labs    Hx of Huningtons' not hx of seizures, depression, emotional outbursts    Follows with Dr. Mcclellan Aw and mammogram UTD; colonoscopy UTD and on file    Review of Systems   Constitutional: Negative. HENT: Negative. Respiratory: Negative. Cardiovascular: Negative. Gastrointestinal: Negative. Endocrine: Negative. Genitourinary: Negative. Musculoskeletal: Negative. Neurological:  Positive for tremors. Psychiatric/Behavioral: Negative. Objective   Physical Exam  Vitals reviewed. Constitutional:       General: She is not in acute distress. Appearance: Normal appearance.    HENT: Head: Normocephalic and atraumatic. Right Ear: Tympanic membrane, ear canal and external ear normal. There is no impacted cerumen. Left Ear: Tympanic membrane, ear canal and external ear normal.      Mouth/Throat:      Mouth: Mucous membranes are moist.      Pharynx: Oropharynx is clear. Eyes:      General:         Right eye: No discharge. Left eye: No discharge. Cardiovascular:      Rate and Rhythm: Normal rate and regular rhythm. Pulses: Normal pulses. Heart sounds: Normal heart sounds. Pulmonary:      Effort: Pulmonary effort is normal.      Breath sounds: Normal breath sounds. Musculoskeletal:      Cervical back: No tenderness. Lymphadenopathy:      Cervical: No cervical adenopathy. Skin:     General: Skin is warm and dry. Neurological:      Mental Status: She is alert and oriented to person, place, and time. Mental status is at baseline. Comments: Choreiform movements   Psychiatric:         Mood and Affect: Mood normal.         Behavior: Behavior normal.                An electronic signature was used to authenticate this note.     --Maryan Nava MD

## 2023-02-03 ENCOUNTER — TELEPHONE (OUTPATIENT)
Dept: PRIMARY CARE CLINIC | Age: 73
End: 2023-02-03

## 2023-02-03 RX ORDER — LEVOTHYROXINE SODIUM 112 UG/1
112 TABLET ORAL DAILY
Qty: 90 TABLET | Refills: 2 | Status: SHIPPED | OUTPATIENT
Start: 2023-02-03

## 2023-02-03 NOTE — TELEPHONE ENCOUNTER
Patient states that she can't take any of the cholesterol medication and dose not want to try the shot. She is going to change up her diet to see if that helps. She also understands about her thyroid medication.

## 2023-02-03 NOTE — TELEPHONE ENCOUNTER
----- Message from Leonides Huffman MD sent at 2/3/2023  7:45 AM EST -----  Two things on patient's labs: her cholesterol is too high: total is 263 (should be 200) and LDL (bad cholesterol) is 183 and should be less than 100. Given how high these levels are, I recommend she be started on a statin.     Also, she has too mch thyroid hormone and I want to decrease her synthroid from 125 mcg to 112 mcg daily and we will repeat thyroid labs in 6 weeks

## 2023-03-13 RX ORDER — LEVOTHYROXINE SODIUM 0.12 MG/1
TABLET ORAL
Qty: 90 TABLET | Refills: 0 | OUTPATIENT
Start: 2023-03-13 | End: 2023-04-12

## 2023-06-01 ENCOUNTER — HOSPITAL ENCOUNTER (OUTPATIENT)
Dept: MAMMOGRAPHY | Age: 73
Discharge: HOME | End: 2023-06-03
Payer: MEDICARE

## 2023-06-01 DIAGNOSIS — Z12.31 ENCOUNTER FOR SCREENING MAMMOGRAM FOR MALIGNANT NEOPLASM OF BREAST: ICD-10-CM

## 2023-06-01 PROCEDURE — 77063 BREAST TOMOSYNTHESIS BI: CPT

## 2023-06-05 ENCOUNTER — TRANSCRIBE ORDERS (OUTPATIENT)
Dept: ADMINISTRATIVE | Age: 73
End: 2023-06-05

## 2023-06-05 DIAGNOSIS — N63.22 MASS OF UPPER INNER QUADRANT OF LEFT BREAST: Primary | ICD-10-CM

## 2023-06-26 ENCOUNTER — HOSPITAL ENCOUNTER (OUTPATIENT)
Dept: GENERAL RADIOLOGY | Age: 73
Discharge: HOME | End: 2023-06-28
Payer: MEDICARE

## 2023-06-26 DIAGNOSIS — R92.8 ABNORMAL MAMMOGRAM: ICD-10-CM

## 2023-06-26 PROCEDURE — 88342 IMHCHEM/IMCYTCHM 1ST ANTB: CPT

## 2023-06-26 PROCEDURE — 19083 BX BREAST 1ST LESION US IMAG: CPT

## 2023-06-26 PROCEDURE — 88360 TUMOR IMMUNOHISTOCHEM/MANUAL: CPT

## 2023-06-26 PROCEDURE — 88341 IMHCHEM/IMCYTCHM EA ADD ANTB: CPT

## 2023-06-26 PROCEDURE — 2500000003 HC RX 250 WO HCPCS

## 2023-06-26 PROCEDURE — 88305 TISSUE EXAM BY PATHOLOGIST: CPT

## 2023-06-30 ENCOUNTER — TELEPHONE (OUTPATIENT)
Dept: GENERAL RADIOLOGY | Age: 73
End: 2023-06-30

## 2023-07-11 ENCOUNTER — TELEPHONE (OUTPATIENT)
Dept: GENERAL RADIOLOGY | Age: 73
End: 2023-07-11

## 2023-07-11 NOTE — TELEPHONE ENCOUNTER
Call to Dr. Hernández Wesley office. Marie is scheduled for  breast cancer surgical consultation with Dr. Fidel Gaston at [facility] on 23.   Electronically signed by Robel Chun RN, BSN on 2023 at 9:44 AM

## 2023-07-24 RX ORDER — HYDROCHLOROTHIAZIDE 25 MG/1
25 TABLET ORAL DAILY
Qty: 90 TABLET | Refills: 1 | Status: SHIPPED | OUTPATIENT
Start: 2023-07-24

## 2023-07-24 NOTE — TELEPHONE ENCOUNTER
Refill hydrochlorothiazide;send to [facility] EMERGENCY [facility]

## 2023-08-21 RX ORDER — HYDROCHLOROTHIAZIDE 25 MG/1
TABLET ORAL
Qty: 90 TABLET | Refills: 1 | OUTPATIENT
Start: 2023-08-21

## 2023-08-21 RX ORDER — HYDROCHLOROTHIAZIDE 25 MG/1
TABLET ORAL
Qty: 90 TABLET | Refills: 1 | Status: SHIPPED | OUTPATIENT
Start: 2023-08-21

## 2023-10-17 ENCOUNTER — TELEPHONE (OUTPATIENT)
Dept: CASE MANAGEMENT | Age: 73
End: 2023-10-17

## 2023-10-17 ENCOUNTER — TELEPHONE (OUTPATIENT)
Dept: ONCOLOGY | Age: 73
End: 2023-10-17

## 2023-10-17 ENCOUNTER — HOSPITAL ENCOUNTER (OUTPATIENT)
Dept: RADIATION ONCOLOGY | Age: 73
Discharge: HOME | End: 2023-10-17
Payer: MEDICARE

## 2023-10-17 VITALS
RESPIRATION RATE: 18 BRPM | SYSTOLIC BLOOD PRESSURE: 142 MMHG | DIASTOLIC BLOOD PRESSURE: 82 MMHG | TEMPERATURE: 97.6 F | OXYGEN SATURATION: 97 % | HEART RATE: 74 BPM | BODY MASS INDEX: 25.18 KG/M2 | WEIGHT: 156 LBS

## 2023-10-17 DIAGNOSIS — C50.912 MALIGNANT NEOPLASM OF LEFT FEMALE BREAST, UNSPECIFIED ESTROGEN RECEPTOR STATUS, UNSPECIFIED SITE OF BREAST (HCC): Primary | ICD-10-CM

## 2023-10-17 PROCEDURE — 99205 OFFICE O/P NEW HI 60 MIN: CPT | Performed by: RADIOLOGY

## 2023-10-17 PROCEDURE — 99205 OFFICE O/P NEW HI 60 MIN: CPT

## 2023-10-17 NOTE — TELEPHONE ENCOUNTER
SW briefly met with pt after her consultation with Dr. Pearl Atwood. Pt denied any needs at this time. SW introduced self and role of oncology social work. Pt was encouraged to reach out to this provider should any needs arise. Pt appeared A&Ox4, mood appeared euthymic, affect congruent.        Severa Areola MSW, LISW-S  Oncology Social Worker

## 2023-10-17 NOTE — TELEPHONE ENCOUNTER
Met with patient during her initial consultation with Dr. Monica Stanton for radiation therapy for her Stage IB (T2, N0, M0) ER/CO+, Her2- left breast cancer diagnosis. Introduced myself and explained my role with cancer patients receiving treatment within our cancer centers. Patient was friendly and receptive. States that all of her questions and concerns were fully addressed during her consultation appointment with the radiation oncologist.  Denies any problems with insurance coverage for medication or transportation for the daily treatments. States that she lives alone () and has her three children for support locally. Patient has a history of alia's disease. Reports doing well after surgery and denying any issues with decreased appetite or weight loss. Reviewed additional ancillary services available at the center. Denies any referral needs at this time. Upon inquiring, reviewed that her next scheduled appointment with Dr. Wili Mandujano at [facility] is 11/9/2023. Informed patient that she will be contacted with her [id number] ([facility]) appointment with Dr. Monica Stanton at [facility] (per Dr. Monica Stanton) once his consultation dictation and orders are completed. Verbalizes understanding. Patient provided with written literature of  ACS Booklet: Exercises after Breast Cancer Surgery, ACS Booklet: What Every Women with Breast Cancer Should Know About Lymphedema, NCI: Hormone Therapy for Breast Cancer, Chemocare: Arimidex medication, and [facility] support group flyer. Provided patient with my contact information, office hours, and encouragement to call me with questions or concerns. Patient verbalizes understanding and appreciative of nurse navigator visit. Emotional support provided and greater than 25 minutes spent with patient. Nurse navigator will continue to follow. Yahaira London, RODW, RN, OCN  Oncology Nurse Navigator

## 2023-10-31 ENCOUNTER — APPOINTMENT (OUTPATIENT)
Dept: RADIATION ONCOLOGY | Age: 73
End: 2023-10-31
Payer: MEDICARE

## 2023-10-31 PROCEDURE — 77334 RADIATION TREATMENT AID(S): CPT | Performed by: RADIOLOGY

## 2023-10-31 PROCEDURE — 77290 THER RAD SIMULAJ FIELD CPLX: CPT | Performed by: RADIOLOGY

## 2023-11-07 ENCOUNTER — HOSPITAL ENCOUNTER (OUTPATIENT)
Dept: RADIATION ONCOLOGY | Age: 73
Discharge: HOME | End: 2023-11-07
Payer: MEDICARE

## 2023-11-07 PROCEDURE — 77300 RADIATION THERAPY DOSE PLAN: CPT | Performed by: RADIOLOGY

## 2023-11-07 PROCEDURE — 77295 3-D RADIOTHERAPY PLAN: CPT | Performed by: RADIOLOGY

## 2023-11-07 PROCEDURE — 77334 RADIATION TREATMENT AID(S): CPT | Performed by: RADIOLOGY

## 2023-11-15 ENCOUNTER — HOSPITAL ENCOUNTER (OUTPATIENT)
Dept: RADIATION ONCOLOGY | Age: 73
Discharge: HOME | End: 2023-11-15
Payer: MEDICARE

## 2023-11-15 PROCEDURE — 77417 THER RADIOLOGY PORT IMAGE(S): CPT | Performed by: RADIOLOGY

## 2023-11-16 ENCOUNTER — HOSPITAL ENCOUNTER (OUTPATIENT)
Dept: RADIATION ONCOLOGY | Age: 73
Discharge: HOME | End: 2023-11-16
Payer: MEDICARE

## 2023-11-16 PROCEDURE — 77412 RADIATION TX DELIVERY LVL 3: CPT | Performed by: RADIOLOGY

## 2023-11-17 ENCOUNTER — HOSPITAL ENCOUNTER (OUTPATIENT)
Dept: RADIATION ONCOLOGY | Age: 73
Discharge: HOME | End: 2023-11-17
Payer: MEDICARE

## 2023-11-17 PROCEDURE — 77412 RADIATION TX DELIVERY LVL 3: CPT | Performed by: RADIOLOGY

## 2023-11-19 ENCOUNTER — APPOINTMENT (OUTPATIENT)
Dept: RADIATION ONCOLOGY | Age: 73
End: 2023-11-19
Payer: MEDICARE

## 2023-11-20 ENCOUNTER — APPOINTMENT (OUTPATIENT)
Dept: RADIATION ONCOLOGY | Age: 73
End: 2023-11-20
Payer: MEDICARE

## 2023-11-21 ENCOUNTER — HOSPITAL ENCOUNTER (OUTPATIENT)
Dept: RADIATION ONCOLOGY | Age: 73
Discharge: HOME | End: 2023-11-21
Payer: MEDICARE

## 2023-11-21 VITALS
TEMPERATURE: 97.3 F | RESPIRATION RATE: 18 BRPM | WEIGHT: 153 LBS | SYSTOLIC BLOOD PRESSURE: 126 MMHG | OXYGEN SATURATION: 96 % | DIASTOLIC BLOOD PRESSURE: 82 MMHG | BODY MASS INDEX: 24.69 KG/M2 | HEART RATE: 90 BPM

## 2023-11-21 DIAGNOSIS — C50.919 MALIGNANT NEOPLASM OF FEMALE BREAST, UNSPECIFIED ESTROGEN RECEPTOR STATUS, UNSPECIFIED LATERALITY, UNSPECIFIED SITE OF BREAST (HCC): Primary | ICD-10-CM

## 2023-11-21 PROCEDURE — NBSRV NON-BILLABLE SERVICE: Performed by: RADIOLOGY

## 2023-11-21 PROCEDURE — 77412 RADIATION TX DELIVERY LVL 3: CPT | Performed by: RADIOLOGY

## 2023-11-21 NOTE — PROGRESS NOTES
DEPARTMENT OF RADIATION ONCOLOGY ON TREATMENT VISIT         11/21/2023      NAME:  Cortez Meehan    YOB: 1950    Diagnosis: breast cancer    SUBJECTIVE:   Cortez Meehan has now received fractionated external beam radiation therapy - ongoing. Past medical, surgical, social and family histories reviewed and updated as indicated. Pain: controlled    ALLERGIES:  Pcn [penicillins] and Tape [adhesive tape]         Current Outpatient Medications   Medication Sig Dispense Refill    levothyroxine (SYNTHROID) 112 MCG tablet take 1 tablet by mouth once daily 90 tablet 3    hydroCHLOROthiazide (HYDRODIURIL) 25 MG tablet take 1 tablet by mouth once daily 90 tablet 1    hydroCHLOROthiazide (HYDRODIURIL) 25 MG tablet Take 1 tablet by mouth daily 90 tablet 1    VITAMIN D PO Take 600 mg by mouth 2 times daily      losartan (COZAAR) 25 MG tablet take 1 tablet by mouth once daily      loratadine (CLARITIN) 10 MG capsule Take 10 mg by mouth daily      Coenzyme Q10 (COQ10) 100 MG CAPS Take by mouth      Ascorbic Acid (VITAMIN C) 500 MG tablet Take 500 mg by mouth daily      Pyridoxine HCl (VITAMIN B-6) 100 MG tablet Take 100 mg by mouth in the morning and 100 mg in the evening. vitamin B-12 (CYANOCOBALAMIN) 250 MCG tablet Take 250 mcg by mouth daily      vitamin E 1000 UNITS capsule Take 1,000 Units by mouth daily. calcium-vitamin D (OSCAL) 250-125 MG-UNIT per tablet Take 1 tablet by mouth daily. No current facility-administered medications for this encounter. OBJECTIVE:  Alert and fully ambulatory. Pleasant and conversant. Physical Examination: General appearance - alert, well appearing, and in no distress. Wt Readings from Last 3 Encounters:   11/21/23 69.4 kg (153 lb)   10/17/23 70.8 kg (156 lb)   02/02/23 72.1 kg (159 lb)         ASSESSMENT/PLAN:     Patient is tolerating treatments well with expected toxicities.  RBA were reviewed prior to first

## 2023-11-22 ENCOUNTER — HOSPITAL ENCOUNTER (OUTPATIENT)
Dept: RADIATION ONCOLOGY | Age: 73
Discharge: HOME | End: 2023-11-22
Payer: MEDICARE

## 2023-11-22 PROCEDURE — 77412 RADIATION TX DELIVERY LVL 3: CPT | Performed by: RADIOLOGY

## 2023-11-22 PROCEDURE — 77307 TELETHX ISODOSE PLAN CPLX: CPT | Performed by: RADIOLOGY

## 2023-11-22 PROCEDURE — 77334 RADIATION TREATMENT AID(S): CPT | Performed by: RADIOLOGY

## 2023-11-24 ENCOUNTER — APPOINTMENT (OUTPATIENT)
Dept: RADIATION ONCOLOGY | Age: 73
End: 2023-11-24
Payer: MEDICARE

## 2023-11-27 ENCOUNTER — APPOINTMENT (OUTPATIENT)
Dept: RADIATION ONCOLOGY | Age: 73
End: 2023-11-27
Payer: MEDICARE

## 2023-11-27 PROCEDURE — 77412 RADIATION TX DELIVERY LVL 3: CPT | Performed by: RADIOLOGY

## 2023-11-27 PROCEDURE — 77427 RADIATION TX MANAGEMENT X5: CPT | Performed by: RADIOLOGY

## 2023-11-27 PROCEDURE — 77417 THER RADIOLOGY PORT IMAGE(S): CPT | Performed by: RADIOLOGY

## 2023-11-27 PROCEDURE — 77336 RADIATION PHYSICS CONSULT: CPT | Performed by: RADIOLOGY

## 2023-11-28 ENCOUNTER — APPOINTMENT (OUTPATIENT)
Dept: RADIATION ONCOLOGY | Age: 73
End: 2023-11-28
Payer: MEDICARE

## 2023-11-28 DIAGNOSIS — C50.919 MALIGNANT NEOPLASM OF FEMALE BREAST, UNSPECIFIED ESTROGEN RECEPTOR STATUS, UNSPECIFIED LATERALITY, UNSPECIFIED SITE OF BREAST (HCC): Primary | ICD-10-CM

## 2023-11-28 PROCEDURE — 77412 RADIATION TX DELIVERY LVL 3: CPT | Performed by: RADIOLOGY

## 2023-11-28 PROCEDURE — NBSRV NON-BILLABLE SERVICE: Performed by: RADIOLOGY

## 2023-11-29 ENCOUNTER — APPOINTMENT (OUTPATIENT)
Dept: RADIATION ONCOLOGY | Age: 73
End: 2023-11-29
Payer: MEDICARE

## 2023-11-29 PROCEDURE — 77412 RADIATION TX DELIVERY LVL 3: CPT | Performed by: RADIOLOGY

## 2023-11-30 ENCOUNTER — APPOINTMENT (OUTPATIENT)
Dept: RADIATION ONCOLOGY | Age: 73
End: 2023-11-30
Payer: MEDICARE

## 2023-11-30 PROCEDURE — 77412 RADIATION TX DELIVERY LVL 3: CPT | Performed by: RADIOLOGY

## 2023-12-01 ENCOUNTER — HOSPITAL ENCOUNTER (OUTPATIENT)
Dept: RADIATION ONCOLOGY | Age: 73
Discharge: HOME | End: 2023-12-01
Payer: MEDICARE

## 2023-12-01 PROCEDURE — 77412 RADIATION TX DELIVERY LVL 3: CPT | Performed by: RADIOLOGY

## 2023-12-04 ENCOUNTER — HOSPITAL ENCOUNTER (OUTPATIENT)
Dept: RADIATION ONCOLOGY | Age: 73
Discharge: HOME | End: 2023-12-04
Payer: MEDICARE

## 2023-12-05 ENCOUNTER — HOSPITAL ENCOUNTER (OUTPATIENT)
Dept: RADIATION ONCOLOGY | Age: 73
Discharge: HOME | End: 2023-12-05
Payer: MEDICARE

## 2023-12-05 ENCOUNTER — TELEPHONE (OUTPATIENT)
Dept: CASE MANAGEMENT | Age: 73
End: 2023-12-05

## 2023-12-05 DIAGNOSIS — C50.919 MALIGNANT NEOPLASM OF FEMALE BREAST, UNSPECIFIED ESTROGEN RECEPTOR STATUS, UNSPECIFIED LATERALITY, UNSPECIFIED SITE OF BREAST (HCC): Primary | ICD-10-CM

## 2023-12-05 PROCEDURE — 77412 RADIATION TX DELIVERY LVL 3: CPT | Performed by: RADIOLOGY

## 2023-12-05 PROCEDURE — NBSRV NON-BILLABLE SERVICE: Performed by: RADIOLOGY

## 2023-12-05 NOTE — PROGRESS NOTES
DEPARTMENT OF RADIATION ONCOLOGY ON TREATMENT VISIT         11/20/23        NAME:  Marshall Meehan    YOB: 1950    Diagnosis: breast cancer    SUBJECTIVE:   Marshall Meehan has now received fractionated external beam radiation therapy - ongoing. Past medical, surgical, social and family histories reviewed and updated as indicated. Pain: controlled    ALLERGIES:  Pcn [penicillins] and Tape [adhesive tape]         Current Outpatient Medications   Medication Sig Dispense Refill    levothyroxine (SYNTHROID) 112 MCG tablet take 1 tablet by mouth once daily 90 tablet 3    hydroCHLOROthiazide (HYDRODIURIL) 25 MG tablet take 1 tablet by mouth once daily 90 tablet 1    hydroCHLOROthiazide (HYDRODIURIL) 25 MG tablet Take 1 tablet by mouth daily 90 tablet 1    VITAMIN D PO Take 600 mg by mouth 2 times daily      losartan (COZAAR) 25 MG tablet take 1 tablet by mouth once daily      loratadine (CLARITIN) 10 MG capsule Take 10 mg by mouth daily      Coenzyme Q10 (COQ10) 100 MG CAPS Take by mouth      Ascorbic Acid (VITAMIN C) 500 MG tablet Take 500 mg by mouth daily      Pyridoxine HCl (VITAMIN B-6) 100 MG tablet Take 100 mg by mouth in the morning and 100 mg in the evening. vitamin B-12 (CYANOCOBALAMIN) 250 MCG tablet Take 250 mcg by mouth daily      vitamin E 1000 UNITS capsule Take 1,000 Units by mouth daily. calcium-vitamin D (OSCAL) 250-125 MG-UNIT per tablet Take 1 tablet by mouth daily. No current facility-administered medications for this encounter. OBJECTIVE:  Alert and fully ambulatory. Pleasant and conversant. Physical Examination: General appearance - alert, well appearing, and in no distress. Wt Readings from Last 3 Encounters:   11/21/23 69.4 kg (153 lb)   10/17/23 70.8 kg (156 lb)   02/02/23 72.1 kg (159 lb)         ASSESSMENT/PLAN:     Patient is tolerating treatments well with expected toxicities.  RBA were reviewed prior to first

## 2023-12-05 NOTE — PROGRESS NOTES
DEPARTMENT OF RADIATION ONCOLOGY ON TREATMENT VISIT         12/5/2023      NAME:  Arely Meehan    YOB: 1950    Diagnosis: breast cancer    SUBJECTIVE:   Arely Meehan has now received fractionated external beam radiation therapy - ongoing. Past medical, surgical, social and family histories reviewed and updated as indicated. Pain: controlled    ALLERGIES:  Pcn [penicillins] and Tape [adhesive tape]         Current Outpatient Medications   Medication Sig Dispense Refill    levothyroxine (SYNTHROID) 112 MCG tablet take 1 tablet by mouth once daily 90 tablet 3    hydroCHLOROthiazide (HYDRODIURIL) 25 MG tablet take 1 tablet by mouth once daily 90 tablet 1    hydroCHLOROthiazide (HYDRODIURIL) 25 MG tablet Take 1 tablet by mouth daily 90 tablet 1    VITAMIN D PO Take 600 mg by mouth 2 times daily      losartan (COZAAR) 25 MG tablet take 1 tablet by mouth once daily      loratadine (CLARITIN) 10 MG capsule Take 10 mg by mouth daily      Coenzyme Q10 (COQ10) 100 MG CAPS Take by mouth      Ascorbic Acid (VITAMIN C) 500 MG tablet Take 500 mg by mouth daily      Pyridoxine HCl (VITAMIN B-6) 100 MG tablet Take 100 mg by mouth in the morning and 100 mg in the evening. vitamin B-12 (CYANOCOBALAMIN) 250 MCG tablet Take 250 mcg by mouth daily      vitamin E 1000 UNITS capsule Take 1,000 Units by mouth daily. calcium-vitamin D (OSCAL) 250-125 MG-UNIT per tablet Take 1 tablet by mouth daily. No current facility-administered medications for this encounter. OBJECTIVE:  Alert and fully ambulatory. Pleasant and conversant. Physical Examination: General appearance - alert, well appearing, and in no distress. Wt Readings from Last 3 Encounters:   11/21/23 69.4 kg (153 lb)   10/17/23 70.8 kg (156 lb)   02/02/23 72.1 kg (159 lb)         ASSESSMENT/PLAN:     Patient is tolerating treatments well with expected toxicities.  RBA were reviewed prior to first

## 2023-12-05 NOTE — TELEPHONE ENCOUNTER
Met with patient briefly prior to her daily radiation therapy treatment for follow up. Patient has had 10 treatments so far. Upon inquiring, states that she is doing alright with the treatments. Reports some fatigue. She is using Aquaphor ointment per Dr. Thanh Omer directions. Aware not to apply 3 hours prior to treatment. Denies any problems with transportation for treatment. Seeing Dr. Abad Crowder for OTV today as well for follow up. Declines any current needs for assistance from NN. Patient appreciative of visit. Will continue to follow as needed. Yahaira Salazar, RODW, RN, OCN  Oncology Nurse Navigator

## 2023-12-05 NOTE — PATIENT INSTRUCTIONS
Continue daily fractionated radiation therapy as scheduled. Please see weekly OTV note and intial consultation letter in [facility] for clinical details. Sveta Lance. Kyung Hung MD MS [address]:  [phone]   FAX: [phone] [address]:  [phone]   FAX:    [phone] [address]:  [phone]   FAX:  [phone]  Email: [email or web address]

## 2023-12-06 ENCOUNTER — HOSPITAL ENCOUNTER (OUTPATIENT)
Dept: RADIATION ONCOLOGY | Age: 73
Discharge: HOME | End: 2023-12-06
Payer: MEDICARE

## 2023-12-06 PROCEDURE — 77412 RADIATION TX DELIVERY LVL 3: CPT | Performed by: RADIOLOGY

## 2023-12-07 ENCOUNTER — HOSPITAL ENCOUNTER (OUTPATIENT)
Dept: RADIATION ONCOLOGY | Age: 73
Discharge: HOME | End: 2023-12-07
Payer: MEDICARE

## 2023-12-07 PROCEDURE — 77412 RADIATION TX DELIVERY LVL 3: CPT | Performed by: RADIOLOGY

## 2023-12-08 ENCOUNTER — HOSPITAL ENCOUNTER (OUTPATIENT)
Dept: RADIATION ONCOLOGY | Age: 73
Discharge: HOME | End: 2023-12-08
Payer: MEDICARE

## 2023-12-08 PROCEDURE — 77412 RADIATION TX DELIVERY LVL 3: CPT | Performed by: RADIOLOGY

## 2023-12-11 ENCOUNTER — APPOINTMENT (OUTPATIENT)
Dept: RADIATION ONCOLOGY | Age: 73
End: 2023-12-11
Payer: MEDICARE

## 2023-12-11 PROCEDURE — 77280 THER RAD SIMULAJ FIELD SMPL: CPT | Performed by: RADIOLOGY

## 2023-12-11 PROCEDURE — 77427 RADIATION TX MANAGEMENT X5: CPT | Performed by: RADIOLOGY

## 2023-12-11 PROCEDURE — 77336 RADIATION PHYSICS CONSULT: CPT | Performed by: RADIOLOGY

## 2023-12-11 PROCEDURE — 77412 RADIATION TX DELIVERY LVL 3: CPT | Performed by: RADIOLOGY

## 2023-12-12 ENCOUNTER — APPOINTMENT (OUTPATIENT)
Dept: RADIATION ONCOLOGY | Age: 73
End: 2023-12-12
Payer: MEDICARE

## 2023-12-12 VITALS
SYSTOLIC BLOOD PRESSURE: 126 MMHG | TEMPERATURE: 97.6 F | RESPIRATION RATE: 18 BRPM | OXYGEN SATURATION: 97 % | BODY MASS INDEX: 25.34 KG/M2 | WEIGHT: 157 LBS | DIASTOLIC BLOOD PRESSURE: 82 MMHG | HEART RATE: 74 BPM

## 2023-12-12 DIAGNOSIS — C50.919 MALIGNANT NEOPLASM OF FEMALE BREAST, UNSPECIFIED ESTROGEN RECEPTOR STATUS, UNSPECIFIED LATERALITY, UNSPECIFIED SITE OF BREAST (HCC): Primary | ICD-10-CM

## 2023-12-12 PROCEDURE — 77412 RADIATION TX DELIVERY LVL 3: CPT | Performed by: RADIOLOGY

## 2023-12-12 PROCEDURE — NBSRV NON-BILLABLE SERVICE: Performed by: RADIOLOGY

## 2023-12-12 NOTE — PROGRESS NOTES
DEPARTMENT OF RADIATION ONCOLOGY ON TREATMENT VISIT         12/12/2023      NAME:  Delma Meehan    YOB: 1950    Diagnosis: breast cancer    SUBJECTIVE:   Delma Meehan has now received fractionated external beam radiation therapy - ongoing. Past medical, surgical, social and family histories reviewed and updated as indicated. Pain: controlled    ALLERGIES:  Pcn [penicillins] and Tape [adhesive tape]         Current Outpatient Medications   Medication Sig Dispense Refill    levothyroxine (SYNTHROID) 112 MCG tablet take 1 tablet by mouth once daily 90 tablet 3    hydroCHLOROthiazide (HYDRODIURIL) 25 MG tablet take 1 tablet by mouth once daily 90 tablet 1    hydroCHLOROthiazide (HYDRODIURIL) 25 MG tablet Take 1 tablet by mouth daily 90 tablet 1    VITAMIN D PO Take 600 mg by mouth 2 times daily      losartan (COZAAR) 25 MG tablet take 1 tablet by mouth once daily      loratadine (CLARITIN) 10 MG capsule Take 10 mg by mouth daily      Coenzyme Q10 (COQ10) 100 MG CAPS Take by mouth      Ascorbic Acid (VITAMIN C) 500 MG tablet Take 500 mg by mouth daily      Pyridoxine HCl (VITAMIN B-6) 100 MG tablet Take 100 mg by mouth in the morning and 100 mg in the evening. vitamin B-12 (CYANOCOBALAMIN) 250 MCG tablet Take 250 mcg by mouth daily      vitamin E 1000 UNITS capsule Take 1,000 Units by mouth daily. calcium-vitamin D (OSCAL) 250-125 MG-UNIT per tablet Take 1 tablet by mouth daily. No current facility-administered medications for this encounter. OBJECTIVE:  Alert and fully ambulatory. Pleasant and conversant. Physical Examination: General appearance - alert, well appearing, and in no distress. Wt Readings from Last 3 Encounters:   12/12/23 71.2 kg (157 lb)   11/21/23 69.4 kg (153 lb)   10/17/23 70.8 kg (156 lb)         ASSESSMENT/PLAN:     Patient is tolerating treatments well with expected toxicities.  RBA were reviewed prior to first

## 2023-12-12 NOTE — PATIENT INSTRUCTIONS
Continue daily fractionated radiation therapy as scheduled. Please see weekly OTV note and intial consultation letter in [facility] for clinical details. Saira Talamantes. Ze Richardson MD MS [place] [facility]:  [phone]   FAX: [phone]  [facility]:  [phone]   FAX:    [phone]:  [phone]   FAX:  [phone]  Email: [email or web address]

## 2023-12-13 ENCOUNTER — APPOINTMENT (OUTPATIENT)
Dept: RADIATION ONCOLOGY | Age: 73
End: 2023-12-13
Payer: MEDICARE

## 2023-12-13 PROCEDURE — 77412 RADIATION TX DELIVERY LVL 3: CPT | Performed by: RADIOLOGY

## 2023-12-14 ENCOUNTER — APPOINTMENT (OUTPATIENT)
Dept: RADIATION ONCOLOGY | Age: 73
End: 2023-12-14
Payer: MEDICARE

## 2023-12-14 PROCEDURE — 77412 RADIATION TX DELIVERY LVL 3: CPT | Performed by: RADIOLOGY

## 2023-12-15 ENCOUNTER — APPOINTMENT (OUTPATIENT)
Dept: RADIATION ONCOLOGY | Age: 73
End: 2023-12-15
Payer: MEDICARE

## 2023-12-15 PROCEDURE — 77412 RADIATION TX DELIVERY LVL 3: CPT | Performed by: RADIOLOGY

## 2023-12-18 ENCOUNTER — APPOINTMENT (OUTPATIENT)
Dept: RADIATION ONCOLOGY | Age: 73
End: 2023-12-18
Payer: MEDICARE

## 2023-12-18 NOTE — PROGRESS NOTES
Marie Meehan  12/18/2023  8:23 AM          Current Outpatient Medications   Medication Sig Dispense Refill    levothyroxine (SYNTHROID) 112 MCG tablet take 1 tablet by mouth once daily 90 tablet 3    hydroCHLOROthiazide (HYDRODIURIL) 25 MG tablet take 1 tablet by mouth once daily 90 tablet 1    hydroCHLOROthiazide (HYDRODIURIL) 25 MG tablet Take 1 tablet by mouth daily 90 tablet 1    VITAMIN D PO Take 600 mg by mouth 2 times daily      losartan (COZAAR) 25 MG tablet take 1 tablet by mouth once daily      loratadine (CLARITIN) 10 MG capsule Take 10 mg by mouth daily      Coenzyme Q10 (COQ10) 100 MG CAPS Take by mouth      Ascorbic Acid (VITAMIN C) 500 MG tablet Take 500 mg by mouth daily      Pyridoxine HCl (VITAMIN B-6) 100 MG tablet Take 100 mg by mouth in the morning and 100 mg in the evening. vitamin B-12 (CYANOCOBALAMIN) 250 MCG tablet Take 250 mcg by mouth daily      vitamin E 1000 UNITS capsule Take 1,000 Units by mouth daily. calcium-vitamin D (OSCAL) 250-125 MG-UNIT per tablet Take 1 tablet by mouth daily. No current facility-administered medications for this encounter. This is an up-to-date medication list.    Please take this list to your next care provider, and discard any previous medication lists.

## 2024-01-09 RX ORDER — HYDROCHLOROTHIAZIDE 25 MG/1
25 TABLET ORAL DAILY
Qty: 90 TABLET | Refills: 0 | Status: SHIPPED | OUTPATIENT
Start: 2024-01-09

## 2024-01-10 ENCOUNTER — TELEPHONE (OUTPATIENT)
Dept: CASE MANAGEMENT | Age: 74
End: 2024-01-10

## 2024-01-10 PROBLEM — Z17.0 MALIGNANT NEOPLASM OF UPPER-INNER QUADRANT OF LEFT BREAST IN FEMALE, ESTROGEN RECEPTOR POSITIVE (HCC): Status: ACTIVE | Noted: 2024-01-10

## 2024-01-10 PROBLEM — C50.212 MALIGNANT NEOPLASM OF UPPER-INNER QUADRANT OF LEFT BREAST IN FEMALE, ESTROGEN RECEPTOR POSITIVE (HCC): Status: ACTIVE | Noted: 2024-01-10

## 2024-01-10 NOTE — TELEPHONE ENCOUNTER
Prepared patient's Survivorship Care Plan and Treatment Summary for her follow up appointment with Dr. Mandel at [facility] on 1/15/2024.  Copy sent to patient's PCP.  Folder prepared with additional written literature.  SYDNI Hernandez, BSW, RN, OCN  Oncology Nurse Navigator

## 2024-01-26 ENCOUNTER — TELEPHONE (OUTPATIENT)
Dept: CASE MANAGEMENT | Age: 74
End: 2024-01-26

## 2024-01-26 ENCOUNTER — HOSPITAL ENCOUNTER (OUTPATIENT)
Dept: RADIATION ONCOLOGY | Age: 74
Discharge: HOME | End: 2024-01-26

## 2024-01-26 VITALS
WEIGHT: 152.2 LBS | TEMPERATURE: 97.4 F | SYSTOLIC BLOOD PRESSURE: 133 MMHG | HEART RATE: 76 BPM | RESPIRATION RATE: 18 BRPM | BODY MASS INDEX: 24.57 KG/M2 | DIASTOLIC BLOOD PRESSURE: 81 MMHG | OXYGEN SATURATION: 96 %

## 2024-01-26 DIAGNOSIS — C50.212 MALIGNANT NEOPLASM OF UPPER-INNER QUADRANT OF LEFT BREAST IN FEMALE, ESTROGEN RECEPTOR POSITIVE (HCC): Primary | ICD-10-CM

## 2024-01-26 DIAGNOSIS — Z17.0 MALIGNANT NEOPLASM OF UPPER-INNER QUADRANT OF LEFT BREAST IN FEMALE, ESTROGEN RECEPTOR POSITIVE (HCC): Primary | ICD-10-CM

## 2024-01-26 PROCEDURE — NBSRV NON-BILLABLE SERVICE: Performed by: NURSE PRACTITIONER

## 2024-01-26 NOTE — TELEPHONE ENCOUNTER
Met with patient during her follow up appointment with radiation therapy today.  Patient completed her active treatment for her Stage IB (T2, N0, M0) ER/MT+, Her2- left breast cancer.  States that she is doing well following her radiation therapy completion.  Reports that her appetite is good and she is sleeping well.  Upon inquiring, states that she is not going to proceed with taking the Arimidex medication daily due to her medical history of [place]'s disease.  Encouraged her to discuss with her medical oncologist, Dr. Etienne at [facility], if she has any additional questions regarding the medication.  Provided support and encouragement.  Instructed patient in detail on her Cancer Treatment Summary and Survivorship Care Plan.  Provided with a written copy.  Aware that a copy will be sent to her PCP as well.  Provided written copies of Nutrition Following Cancer Treatment: Oncology Nutrition Guide, [facility] support group pamphlet, and Thriving and Surviving Post-Cancer Treatment: Nutritional and Emotional Support Services packet.  Patient aware of mammogram scheduled for 6/5/2024 with her next follow up appointment with Dr. Dorado at [facility] on 6/5/2024.  Completed patient's lymphedema upper extremity measurements and charted in patient's flow sheet.  Instructed patient to call with any questions or concerns.  Verbally agreed.  Patient appreciative of visit and information.  SYDNI Hernandez, BSW, RN, OCN  Oncology Nurse Navigator

## 2024-01-26 NOTE — PROGRESS NOTES
Marie Meehan  1/26/2024  11:29 AM      Vitals:    01/26/24 1126   BP: 133/81   Pulse: 76   Resp: 18   Temp: 97.4 °F (36.3 °C)   SpO2: 96%    :    Wt Readings from Last 3 Encounters:   01/26/24 69 kg (152 lb 3.2 oz)   12/12/23 71.2 kg (157 lb)   11/21/23 69.4 kg (153 lb)                Current Outpatient Medications:     hydroCHLOROthiazide (HYDRODIURIL) 25 MG tablet, Take 1 tablet by mouth daily, Disp: 90 tablet, Rfl: 0    levothyroxine (SYNTHROID) 112 MCG tablet, take 1 tablet by mouth once daily, Disp: 90 tablet, Rfl: 3    VITAMIN D PO, Take 600 mg by mouth 2 times daily, Disp: , Rfl:     losartan (COZAAR) 25 MG tablet, take 1 tablet by mouth once daily, Disp: , Rfl:     loratadine (CLARITIN) 10 MG capsule, Take 10 mg by mouth daily, Disp: , Rfl:     Coenzyme Q10 (COQ10) 100 MG CAPS, Take by mouth, Disp: , Rfl:     Ascorbic Acid (VITAMIN C) 500 MG tablet, Take 500 mg by mouth daily, Disp: , Rfl:     Pyridoxine HCl (VITAMIN B-6) 100 MG tablet, Take 100 mg by mouth in the morning and 100 mg in the evening., Disp: , Rfl:     vitamin B-12 (CYANOCOBALAMIN) 250 MCG tablet, Take 250 mcg by mouth daily, Disp: , Rfl:     vitamin E 1000 UNITS capsule, Take 1,000 Units by mouth daily., Disp: , Rfl:     calcium-vitamin D (OSCAL) 250-125 MG-UNIT per tablet, Take 1 tablet by mouth daily., Disp: , Rfl:       Patient is seen today in follow up for RT to her left breast for her Left Breast IDC, grade 2, ER/NE+, HER-2- breast cancer. Patient received her RT to her left breast, tb and wide margin from 11/16/23-12/18/23 completing 20fx;5256cGy. Patient denies any pain or skin issues to the irradiated skin area of left breast besides occasional nipple pain, but states currently has none. Patient does continue to use Aquaphor lotion/ointment to the irradiated skin area. Patient is following with Dr. Etienne at the [facility] for Med Onc last seen on 09/28/23 and has next appointment scheduled for 02/06/24. She states she has not started 
mouth daily      vitamin E 1000 UNITS capsule Take 1,000 Units by mouth daily.      calcium-vitamin D (OSCAL) 250-125 MG-UNIT per tablet Take 1 tablet by mouth daily.       No current facility-administered medications for this encounter.       Physical Examination:   Vitals:    01/26/24 1126   BP: 133/81   Pulse: 76   Resp: 18   Temp: 97.4 °F (36.3 °C)   SpO2: 96%       Wt Readings from Last 3 Encounters:   01/26/24 69 kg (152 lb 3.2 oz)   12/12/23 71.2 kg (157 lb)   11/21/23 69.4 kg (153 lb)       Alert and fully ambulatory. Pleasant and conversant.        ASSESSMENT/PLAN:     IDC of the upper-inner quadrant of the left breast, Gr 2, ER positive > 95%, KY positive 80%; HER2 negative by FISH. S/p left breast partial mastectomy with SLNBx on 07/31/2023. S/p adjuvant radiation therapy completion 12/18/2023.     The patient is doing well post radiation completion. Skin care discussed including breast/ scar massage.     Imaging per Medical Oncology.     I discussed follow up plans with Marie Meehan.  At this time, continue Oncology follow-up with primary Medical Oncologist- Dr. Etienne. See Radiation Oncology as needed.     Marie Meehan is to follow up with other physicians/ APPs involved in their care as directed (including but not limited to Medical Oncology, Breast Surgery and Primary Care).    The patient was given our contact number in the event that if at any time they change their mind and would like to return to the clinic to see either myself or one of the Radiation Oncologists, they can simply call us and we would be happy to see them sooner.        Thank you for involving us in the management of this extremely pleasant patient.        More than 15 min was in direct contact with pt coordinating/giving care. >50% of the visit was spent in counseling the pt on the following:  Follow up care    The nurses notes were reviewed and incorporated into this assessment and plan.      Questions answered to

## 2024-04-08 RX ORDER — HYDROCHLOROTHIAZIDE 25 MG/1
25 TABLET ORAL DAILY
Qty: 90 TABLET | Refills: 0 | OUTPATIENT
Start: 2024-04-08

## 2024-04-17 ENCOUNTER — OFFICE VISIT (OUTPATIENT)
Dept: PRIMARY CARE CLINIC | Age: 74
End: 2024-04-17
Payer: MEDICARE

## 2024-04-17 VITALS
WEIGHT: 153 LBS | TEMPERATURE: 97.5 F | OXYGEN SATURATION: 85 % | HEIGHT: 66 IN | HEART RATE: 87 BPM | BODY MASS INDEX: 24.59 KG/M2 | SYSTOLIC BLOOD PRESSURE: 137 MMHG | DIASTOLIC BLOOD PRESSURE: 77 MMHG

## 2024-04-17 DIAGNOSIS — E89.0 POSTOPERATIVE HYPOTHYROIDISM: ICD-10-CM

## 2024-04-17 DIAGNOSIS — E55.9 VITAMIN D DEFICIENCY: ICD-10-CM

## 2024-04-17 DIAGNOSIS — H91.93 HEARING PROBLEM OF BOTH EARS: Primary | ICD-10-CM

## 2024-04-17 DIAGNOSIS — Z76.89 ESTABLISHING CARE WITH NEW DOCTOR, ENCOUNTER FOR: ICD-10-CM

## 2024-04-17 LAB
ALBUMIN: 4.2 G/DL (ref 3.5–5.2)
ALP BLD-CCNC: 83 U/L (ref 35–104)
ALT SERPL-CCNC: 19 U/L (ref 0–32)
ANION GAP SERPL CALCULATED.3IONS-SCNC: 17 MMOL/L (ref 7–16)
AST SERPL-CCNC: 24 U/L (ref 0–31)
BILIRUB SERPL-MCNC: 0.2 MG/DL (ref 0–1.2)
BUN BLDV-MCNC: 25 MG/DL (ref 6–23)
CALCIUM SERPL-MCNC: 9.7 MG/DL (ref 8.6–10.2)
CHLORIDE BLD-SCNC: 100 MMOL/L (ref 98–107)
CO2: 23 MMOL/L (ref 22–29)
CREAT SERPL-MCNC: 1 MG/DL (ref 0.5–1)
GFR SERPL CREATININE-BSD FRML MDRD: 61 ML/MIN/1.73M2
GLUCOSE BLD-MCNC: 108 MG/DL (ref 74–99)
HCT VFR BLD CALC: 39.4 % (ref 34–48)
HEMOGLOBIN: 12.3 G/DL (ref 11.5–15.5)
MCH RBC QN AUTO: 28.9 PG (ref 26–35)
MCHC RBC AUTO-ENTMCNC: 31.2 G/DL (ref 32–34.5)
MCV RBC AUTO: 92.7 FL (ref 80–99.9)
PDW BLD-RTO: 14.8 % (ref 11.5–15)
PLATELET # BLD: 262 K/UL (ref 130–450)
PMV BLD AUTO: 11.9 FL (ref 7–12)
POTASSIUM SERPL-SCNC: 3.8 MMOL/L (ref 3.5–5)
RBC # BLD: 4.25 M/UL (ref 3.5–5.5)
SODIUM BLD-SCNC: 140 MMOL/L (ref 132–146)
T4 FREE: 1.6 NG/DL (ref 0.9–1.7)
TOTAL PROTEIN: 7.5 G/DL (ref 6.4–8.3)
TSH SERPL DL<=0.05 MIU/L-ACNC: 0.07 UIU/ML (ref 0.27–4.2)
VITAMIN D 25-HYDROXY: 28.6 NG/ML (ref 30–100)
WBC # BLD: 5.6 K/UL (ref 4.5–11.5)

## 2024-04-17 PROCEDURE — 1090F PRES/ABSN URINE INCON ASSESS: CPT | Performed by: FAMILY MEDICINE

## 2024-04-17 PROCEDURE — G8400 PT W/DXA NO RESULTS DOC: HCPCS | Performed by: FAMILY MEDICINE

## 2024-04-17 PROCEDURE — G8427 DOCREV CUR MEDS BY ELIG CLIN: HCPCS | Performed by: FAMILY MEDICINE

## 2024-04-17 PROCEDURE — 36415 COLL VENOUS BLD VENIPUNCTURE: CPT | Performed by: FAMILY MEDICINE

## 2024-04-17 PROCEDURE — 1123F ACP DISCUSS/DSCN MKR DOCD: CPT | Performed by: FAMILY MEDICINE

## 2024-04-17 PROCEDURE — 99203 OFFICE O/P NEW LOW 30 MIN: CPT | Performed by: FAMILY MEDICINE

## 2024-04-17 PROCEDURE — 1036F TOBACCO NON-USER: CPT | Performed by: FAMILY MEDICINE

## 2024-04-17 PROCEDURE — G8419 CALC BMI OUT NRM PARAM NOF/U: HCPCS | Performed by: FAMILY MEDICINE

## 2024-04-17 RX ORDER — ASPIRIN 81 MG
5 TABLET, DELAYED RELEASE (ENTERIC COATED) ORAL 2 TIMES DAILY
Qty: 15 ML | Refills: 2 | Status: SHIPPED | OUTPATIENT
Start: 2024-04-17 | End: 2024-07-16

## 2024-04-17 RX ORDER — VITAMIN E 268 MG
400 CAPSULE ORAL DAILY
COMMUNITY

## 2024-04-17 SDOH — ECONOMIC STABILITY: FOOD INSECURITY: WITHIN THE PAST 12 MONTHS, THE FOOD YOU BOUGHT JUST DIDN'T LAST AND YOU DIDN'T HAVE MONEY TO GET MORE.: NEVER TRUE

## 2024-04-17 SDOH — ECONOMIC STABILITY: FOOD INSECURITY: WITHIN THE PAST 12 MONTHS, YOU WORRIED THAT YOUR FOOD WOULD RUN OUT BEFORE YOU GOT MONEY TO BUY MORE.: NEVER TRUE

## 2024-04-17 SDOH — ECONOMIC STABILITY: INCOME INSECURITY: HOW HARD IS IT FOR YOU TO PAY FOR THE VERY BASICS LIKE FOOD, HOUSING, MEDICAL CARE, AND HEATING?: NOT HARD AT ALL

## 2024-04-17 ASSESSMENT — PATIENT HEALTH QUESTIONNAIRE - PHQ9
SUM OF ALL RESPONSES TO PHQ QUESTIONS 1-9: 0
1. LITTLE INTEREST OR PLEASURE IN DOING THINGS: NOT AT ALL
SUM OF ALL RESPONSES TO PHQ QUESTIONS 1-9: 0

## 2024-04-17 NOTE — PROGRESS NOTES
[facility]  Family Medicine      Patient:  Marie Meehan 74 y.o. female  Date of Service: 4/17/24      Chief complaint:   Chief Complaint   Patient presents with    Care Management     Establish care     Cerumen Impaction         History of Present Illness   Marie Meehan is a 74 y.o. female who presents to the clinic with complaints as above.    Establish Care with New Physician  Medical History discussed and updated in chart  Surgical History discussed and updated in chart  Social History discussed and updated in chart  Medications reviewed and updated in chart as appropriate  Dr. Ritchie for Cardiology  Dr. Savage for RadOnc  See below for Acute/Chronic conditions addressed today     Ear Problem  Has hearing difficulty from R>L ear  Has happened before and it was a cerumen impaction  Cerumen disimpaction attempted today with little success, tolerated well    Current Health Maintenance:  Health Maintenance   Topic Date Due    COVID-19 Vaccine (1) Never done    Pneumococcal 65+ years Vaccine (1 of 2 - PCV) Never done    Hepatitis C screen  Never done    DTaP/Tdap/Td vaccine (1 - Tdap) Never done    Shingles vaccine (1 of 2) Never done    DEXA (modify frequency per FRAX score)  Never done    Respiratory Syncytial Virus (RSV) Pregnant or age 60 yrs+ (1 - 1-dose 60+ series) Never done    Annual Wellness Visit (Medicare)  Never done    Flu vaccine (Season Ended) 08/01/2024    Depression Screen  04/17/2025    Hepatitis A vaccine  Aged Out    Hepatitis B vaccine  Aged Out    Hib vaccine  Aged Out    Polio vaccine  Aged Out    Meningococcal (ACWY) vaccine  Aged Out    Lipids  Discontinued    Breast cancer screen  Discontinued    Colorectal Cancer Screen  Discontinued       Past Medical History:      Diagnosis Date    Cancer (HCC) 06/26/2023    Left breast IDC, ER positive.    Colon cancer screening     GERD (gastroesophageal reflux disease)     Yosi disease (HCC)     Thyroid

## 2024-04-19 RX ORDER — HYDROCHLOROTHIAZIDE 25 MG/1
25 TABLET ORAL DAILY
Qty: 90 TABLET | Refills: 1 | Status: SHIPPED | OUTPATIENT
Start: 2024-04-19

## 2024-04-19 NOTE — TELEPHONE ENCOUNTER
Last Appointment   4/17/2024  Next Appointment  4/24/2024       Calling to refill hydrochlorothiazide 25 mg to [facility] [place] , took last one today, was sent as refill on 4/9/24 but says refused?

## 2024-04-24 ENCOUNTER — OFFICE VISIT (OUTPATIENT)
Dept: PRIMARY CARE CLINIC | Age: 74
End: 2024-04-24
Payer: MEDICARE

## 2024-04-24 VITALS
BODY MASS INDEX: 24.65 KG/M2 | HEART RATE: 82 BPM | SYSTOLIC BLOOD PRESSURE: 132 MMHG | DIASTOLIC BLOOD PRESSURE: 88 MMHG | TEMPERATURE: 97.8 F | OXYGEN SATURATION: 96 % | WEIGHT: 153.4 LBS | RESPIRATION RATE: 16 BRPM | HEIGHT: 66 IN

## 2024-04-24 DIAGNOSIS — H61.23 BILATERAL IMPACTED CERUMEN: ICD-10-CM

## 2024-04-24 DIAGNOSIS — H60.333 ACUTE SWIMMER'S EAR OF BOTH SIDES: Primary | ICD-10-CM

## 2024-04-24 PROBLEM — I10 HTN (HYPERTENSION): Status: ACTIVE | Noted: 2023-07-21

## 2024-04-24 PROBLEM — K21.9 GERD (GASTROESOPHAGEAL REFLUX DISEASE): Status: ACTIVE | Noted: 2023-07-24

## 2024-04-24 PROBLEM — I47.10 SVT (SUPRAVENTRICULAR TACHYCARDIA) (HCC): Status: ACTIVE | Noted: 2023-07-27

## 2024-04-24 PROBLEM — I65.29 CAROTID STENOSIS: Status: ACTIVE | Noted: 2023-07-24

## 2024-04-24 PROBLEM — R01.1 CARDIAC MURMUR: Status: ACTIVE | Noted: 2023-07-24

## 2024-04-24 PROBLEM — E03.9 HYPOTHYROIDISM: Status: ACTIVE | Noted: 2023-07-21

## 2024-04-24 PROBLEM — G10 HUNTINGTON'S DISEASE (HCC): Status: ACTIVE | Noted: 2023-07-24

## 2024-04-24 PROCEDURE — 4130F TOPICAL PREP RX AOE: CPT | Performed by: FAMILY MEDICINE

## 2024-04-24 PROCEDURE — 99213 OFFICE O/P EST LOW 20 MIN: CPT | Performed by: FAMILY MEDICINE

## 2024-04-24 PROCEDURE — 3075F SYST BP GE 130 - 139MM HG: CPT | Performed by: FAMILY MEDICINE

## 2024-04-24 PROCEDURE — G8427 DOCREV CUR MEDS BY ELIG CLIN: HCPCS | Performed by: FAMILY MEDICINE

## 2024-04-24 PROCEDURE — 1090F PRES/ABSN URINE INCON ASSESS: CPT | Performed by: FAMILY MEDICINE

## 2024-04-24 PROCEDURE — 1123F ACP DISCUSS/DSCN MKR DOCD: CPT | Performed by: FAMILY MEDICINE

## 2024-04-24 PROCEDURE — 1036F TOBACCO NON-USER: CPT | Performed by: FAMILY MEDICINE

## 2024-04-24 PROCEDURE — G8400 PT W/DXA NO RESULTS DOC: HCPCS | Performed by: FAMILY MEDICINE

## 2024-04-24 PROCEDURE — G8419 CALC BMI OUT NRM PARAM NOF/U: HCPCS | Performed by: FAMILY MEDICINE

## 2024-04-24 PROCEDURE — 69210 REMOVE IMPACTED EAR WAX UNI: CPT | Performed by: FAMILY MEDICINE

## 2024-04-24 PROCEDURE — 3079F DIAST BP 80-89 MM HG: CPT | Performed by: FAMILY MEDICINE

## 2024-04-24 RX ORDER — CIPROFLOXACIN AND DEXAMETHASONE 3; 1 MG/ML; MG/ML
4 SUSPENSION/ DROPS AURICULAR (OTIC) 2 TIMES DAILY
Qty: 1 EACH | Refills: 0 | Status: SHIPPED | OUTPATIENT
Start: 2024-04-24 | End: 2024-05-01

## 2024-04-24 NOTE — PROGRESS NOTES
[facility]  Family Medicine      Patient:  Marie Meehan 74 y.o. female  Date of Service: 4/24/24      Chief complaint:   Chief Complaint   Patient presents with    Cerumen Impaction     Here for removal          History of Present Illness   Marie Meehan is a 74 y.o. female who presents to the clinic with complaints as above.    Ear problem  Patient here today for cerumen removal.  Patient noted to have ceruminosis bilaterally, she did soften the cerumen with Debrox over the past few days.  Risks benefits and alternatives of cerumen extraction discussed and patient agrees to proceed.  Cerumen extraction performed bilaterally using ear flush as well as curette.  Patient did have significant relief with this, with slightly improved hearing.  She tolerated the procedure well.  Patient was noted to have redness and slight white discharge behind the cerumen blockages, consistent with ear canal infection.  Antibiotic eardrops sent in.    Current Health Maintenance:  Health Maintenance   Topic Date Due    COVID-19 Vaccine (1) Never done    Pneumococcal 65+ years Vaccine (1 of 2 - PCV) Never done    Hepatitis C screen  Never done    DTaP/Tdap/Td vaccine (1 - Tdap) Never done    Shingles vaccine (1 of 2) Never done    DEXA (modify frequency per FRAX score)  Never done    Respiratory Syncytial Virus (RSV) Pregnant or age 60 yrs+ (1 - 1-dose 60+ series) Never done    Annual Wellness Visit (Medicare)  Never done    Flu vaccine (Season Ended) 08/01/2024    Depression Screen  04/17/2025    Hepatitis A vaccine  Aged Out    Hepatitis B vaccine  Aged Out    Hib vaccine  Aged Out    Polio vaccine  Aged Out    Meningococcal (ACWY) vaccine  Aged Out    Lipids  Discontinued    Breast cancer screen  Discontinued    Colorectal Cancer Screen  Discontinued       Past Medical History:      Diagnosis Date    Cancer (HCC) 06/26/2023    Left breast IDC, ER positive.    Colon cancer screening     GERD

## 2024-05-19 ENCOUNTER — HOSPITAL ENCOUNTER (EMERGENCY)
Age: 74
Discharge: HOME | End: 2024-05-19
Attending: EMERGENCY MEDICINE
Payer: MEDICARE

## 2024-05-19 ENCOUNTER — APPOINTMENT (OUTPATIENT)
Dept: GENERAL RADIOLOGY | Age: 74
End: 2024-05-19
Payer: MEDICARE

## 2024-05-19 ENCOUNTER — APPOINTMENT (OUTPATIENT)
Dept: CT IMAGING | Age: 74
End: 2024-05-19
Payer: MEDICARE

## 2024-05-19 VITALS
DIASTOLIC BLOOD PRESSURE: 99 MMHG | WEIGHT: 153 LBS | OXYGEN SATURATION: 96 % | HEART RATE: 78 BPM | RESPIRATION RATE: 18 BRPM | BODY MASS INDEX: 25.07 KG/M2 | SYSTOLIC BLOOD PRESSURE: 153 MMHG | TEMPERATURE: 98 F

## 2024-05-19 DIAGNOSIS — R53.1 GENERAL WEAKNESS: Primary | ICD-10-CM

## 2024-05-19 LAB
ALBUMIN SERPL-MCNC: 4 G/DL (ref 3.5–5.2)
ALP SERPL-CCNC: 89 U/L (ref 35–104)
ALT SERPL-CCNC: 17 U/L (ref 0–32)
ANION GAP SERPL CALCULATED.3IONS-SCNC: 10 MMOL/L (ref 7–16)
AST SERPL-CCNC: 20 U/L (ref 0–31)
BASOPHILS # BLD: 0.02 K/UL (ref 0–0.2)
BASOPHILS NFR BLD: 0 % (ref 0–2)
BILIRUB SERPL-MCNC: 0.2 MG/DL (ref 0–1.2)
BILIRUB UR QL STRIP: NEGATIVE
BUN SERPL-MCNC: 21 MG/DL (ref 6–23)
CALCIUM SERPL-MCNC: 10 MG/DL (ref 8.6–10.2)
CHLORIDE SERPL-SCNC: 102 MMOL/L (ref 98–107)
CLARITY UR: CLEAR
CO2 SERPL-SCNC: 29 MMOL/L (ref 22–29)
COLOR UR: YELLOW
CREAT SERPL-MCNC: 0.9 MG/DL (ref 0.5–1)
EOSINOPHIL # BLD: 0.09 K/UL (ref 0.05–0.5)
EOSINOPHILS RELATIVE PERCENT: 2 % (ref 0–6)
EPI CELLS #/AREA URNS HPF: NORMAL /HPF
ERYTHROCYTE [DISTWIDTH] IN BLOOD BY AUTOMATED COUNT: 14.6 % (ref 11.5–15)
GFR, ESTIMATED: 69 ML/MIN/1.73M2
GLUCOSE SERPL-MCNC: 108 MG/DL (ref 74–99)
GLUCOSE UR STRIP-MCNC: NEGATIVE MG/DL
HCT VFR BLD AUTO: 38.2 % (ref 34–48)
HGB BLD-MCNC: 12.7 G/DL (ref 11.5–15.5)
HGB UR QL STRIP.AUTO: NEGATIVE
IMM GRANULOCYTES # BLD AUTO: <0.03 K/UL (ref 0–0.58)
IMM GRANULOCYTES NFR BLD: 0 % (ref 0–5)
INR PPP: 1.1
KETONES UR STRIP-MCNC: NEGATIVE MG/DL
LEUKOCYTE ESTERASE UR QL STRIP: NEGATIVE
LYMPHOCYTES NFR BLD: 0.83 K/UL (ref 1.5–4)
LYMPHOCYTES RELATIVE PERCENT: 16 % (ref 20–42)
MCH RBC QN AUTO: 28.5 PG (ref 26–35)
MCHC RBC AUTO-ENTMCNC: 33.2 G/DL (ref 32–34.5)
MCV RBC AUTO: 85.7 FL (ref 80–99.9)
MONOCYTES NFR BLD: 0.66 K/UL (ref 0.1–0.95)
MONOCYTES NFR BLD: 12 % (ref 2–12)
NEUTROPHILS NFR BLD: 70 % (ref 43–80)
NEUTS SEG NFR BLD: 3.71 K/UL (ref 1.8–7.3)
NITRITE UR QL STRIP: NEGATIVE
PH UR STRIP: 8 [PH] (ref 5–9)
PLATELET # BLD AUTO: 210 K/UL (ref 130–450)
PMV BLD AUTO: 11.1 FL (ref 7–12)
POTASSIUM SERPL-SCNC: 3.8 MMOL/L (ref 3.5–5)
PROT SERPL-MCNC: 6.6 G/DL (ref 6.4–8.3)
PROT UR STRIP-MCNC: NEGATIVE MG/DL
PROTHROMBIN TIME: 12.3 SEC (ref 9.3–12.4)
RBC # BLD AUTO: 4.46 M/UL (ref 3.5–5.5)
RBC #/AREA URNS HPF: NORMAL /HPF
SARS-COV-2 RDRP RESP QL NAA+PROBE: NOT DETECTED
SODIUM SERPL-SCNC: 141 MMOL/L (ref 132–146)
SP GR UR STRIP: 1.01 (ref 1–1.03)
SPECIMEN DESCRIPTION: NORMAL
TROPONIN I SERPL HS-MCNC: 13 NG/L (ref 0–9)
TROPONIN I SERPL HS-MCNC: 15 NG/L (ref 0–9)
UROBILINOGEN UR STRIP-ACNC: 0.2 EU/DL (ref 0–1)
WBC #/AREA URNS HPF: NORMAL /HPF
WBC OTHER # BLD: 5.3 K/UL (ref 4.5–11.5)

## 2024-05-19 PROCEDURE — 84484 ASSAY OF TROPONIN QUANT: CPT

## 2024-05-19 PROCEDURE — 70450 CT HEAD/BRAIN W/O DYE: CPT

## 2024-05-19 PROCEDURE — 87635 SARS-COV-2 COVID-19 AMP PRB: CPT

## 2024-05-19 PROCEDURE — 85025 COMPLETE CBC W/AUTO DIFF WBC: CPT

## 2024-05-19 PROCEDURE — 85610 PROTHROMBIN TIME: CPT

## 2024-05-19 PROCEDURE — 71045 X-RAY EXAM CHEST 1 VIEW: CPT

## 2024-05-19 PROCEDURE — 81001 URINALYSIS AUTO W/SCOPE: CPT

## 2024-05-19 PROCEDURE — 80053 COMPREHEN METABOLIC PANEL: CPT

## 2024-05-19 PROCEDURE — 93005 ELECTROCARDIOGRAM TRACING: CPT

## 2024-05-19 PROCEDURE — 99285 EMERGENCY DEPT VISIT HI MDM: CPT

## 2024-05-19 NOTE — DISCHARGE INSTRUCTIONS
Continue home medications as prescribed.  Follow-up with your primary care physician to follow-up on your symptoms and to discuss your recent emergency room visit.  Please return if your symptoms worsen, you have worsening ability to walk, severe weakness, severe headache, or development of uncontrolled fevers.

## 2024-05-19 NOTE — ED PROVIDER NOTES
[facility] EMERGENCY DEPARTMENT  EMERGENCY DEPARTMENT ENCOUNTER        Pt Name: Marie Meehan  MRN: [id number]  Birthdate 1950  Date of evaluation: 5/19/2024  Provider: Mai Cottrell MD  Attending Provider: No att. providers found  PCP: Martina Trevino DO  Note Started: 9:40 AM EDT 5/19/24    CHIEF COMPLAINT       Chief Complaint   Patient presents with    Extremity Weakness     Bilateral leg weakness that started Monday.        HISTORY OF PRESENT ILLNESS: 1 or more Elements   History From: the patient        Marie Meehan is a 74 y.o. female with a past medical history of [place]'s disease, hypertension, reflux, hypothyroidism presenting with extremity weakness.  Patient states that on 5/13, she believes that she might of had a stroke, she had sudden onset of severe balance issues and had a hard time ambulating at that time.  Patient states that her symptoms spontaneously improved.  She reports that throughout the week, she has been having episodes of lower extremity weakness.  Patient's daughter is concerned about her and made her come in for further evaluation.  Patient denies anticoagulant use.  Patient denies headache, vision changes, numbness, loss of sensation, chest pain, shortness of breath, nausea, vomiting, abdominal pain, hematuria, dysuria, increasing or decreasing urine frequency, blood in stool, constipation, diarrhea.    Nursing Notes were all reviewed and agreed with or any disagreements were addressed in the HPI.      REVIEW OF EXTERNAL NOTE :       Office visit 4/24/24:     Assessment and Plan      1. Acute swimmer's ear of both sides  Uncontrolled  Trial Ciprodex  Follow-up as needed  Follow-up in 1 month for hearing  - ciprofloxacin-dexAMETHasone (CIPRODEX) 0.3-0.1 % otic suspension; Place 4 drops into both ears 2 times daily for 7 days  Dispense: 1 each; Refill: 0        Counseled regarding above diagnosis, including possible risks and

## 2024-05-22 ENCOUNTER — OFFICE VISIT (OUTPATIENT)
Dept: PRIMARY CARE CLINIC | Age: 74
End: 2024-05-22
Payer: MEDICARE

## 2024-05-22 VITALS
HEART RATE: 82 BPM | TEMPERATURE: 97.8 F | SYSTOLIC BLOOD PRESSURE: 112 MMHG | WEIGHT: 150.4 LBS | BODY MASS INDEX: 24.17 KG/M2 | HEIGHT: 66 IN | DIASTOLIC BLOOD PRESSURE: 68 MMHG | OXYGEN SATURATION: 97 %

## 2024-05-22 DIAGNOSIS — H91.93 HEARING PROBLEM OF BOTH EARS: Primary | ICD-10-CM

## 2024-05-22 DIAGNOSIS — G10 HUNTINGTON'S DISEASE (HCC): ICD-10-CM

## 2024-05-22 LAB
EKG ATRIAL RATE: 73 BPM
EKG P AXIS: 44 DEGREES
EKG P-R INTERVAL: 146 MS
EKG Q-T INTERVAL: 392 MS
EKG QRS DURATION: 82 MS
EKG QTC CALCULATION (BAZETT): 431 MS
EKG R AXIS: -13 DEGREES
EKG T AXIS: 43 DEGREES
EKG VENTRICULAR RATE: 73 BPM

## 2024-05-22 PROCEDURE — 93010 ELECTROCARDIOGRAM REPORT: CPT | Performed by: INTERNAL MEDICINE

## 2024-05-22 PROCEDURE — 3078F DIAST BP <80 MM HG: CPT | Performed by: FAMILY MEDICINE

## 2024-05-22 PROCEDURE — 1090F PRES/ABSN URINE INCON ASSESS: CPT | Performed by: FAMILY MEDICINE

## 2024-05-22 PROCEDURE — G8420 CALC BMI NORM PARAMETERS: HCPCS | Performed by: FAMILY MEDICINE

## 2024-05-22 PROCEDURE — G8400 PT W/DXA NO RESULTS DOC: HCPCS | Performed by: FAMILY MEDICINE

## 2024-05-22 PROCEDURE — 3074F SYST BP LT 130 MM HG: CPT | Performed by: FAMILY MEDICINE

## 2024-05-22 PROCEDURE — 1123F ACP DISCUSS/DSCN MKR DOCD: CPT | Performed by: FAMILY MEDICINE

## 2024-05-22 PROCEDURE — 99213 OFFICE O/P EST LOW 20 MIN: CPT | Performed by: FAMILY MEDICINE

## 2024-05-22 PROCEDURE — 1036F TOBACCO NON-USER: CPT | Performed by: FAMILY MEDICINE

## 2024-05-22 PROCEDURE — G8427 DOCREV CUR MEDS BY ELIG CLIN: HCPCS | Performed by: FAMILY MEDICINE

## 2024-05-22 NOTE — PROGRESS NOTES
Stability Vital Sign     Unable to Pay for Housing in the Last Year: Not on file     Number of Places Lived in the Last Year: Not on file     Unstable Housing in the Last Year: No        Family History:       Problem Relation Age of Onset    Other Mother         huntingtons    High Blood Pressure Father     Stroke Father     Heart Disease Father     Other Brother         alia       Medication List:    Current Outpatient Medications   Medication Sig Dispense Refill    hydroCHLOROthiazide (HYDRODIURIL) 25 MG tablet Take 1 tablet by mouth daily 90 tablet 1    vitamin E 400 UNIT capsule Take 1 capsule by mouth daily      carbamide peroxide (DEBROX) 6.5 % otic solution Place 5 drops into both ears 2 times daily 15 mL 2    levothyroxine (SYNTHROID) 112 MCG tablet take 1 tablet by mouth once daily 90 tablet 3    losartan (COZAAR) 25 MG tablet take 1 tablet by mouth once daily      loratadine (CLARITIN) 10 MG capsule Take 1 capsule by mouth daily      Coenzyme Q10 (COQ10) 100 MG CAPS Take by mouth      Ascorbic Acid (VITAMIN C) 500 MG tablet Take 1 tablet by mouth daily      Pyridoxine HCl (VITAMIN B-6) 100 MG tablet Take 1 tablet by mouth in the morning and 1 tablet in the evening.      vitamin B-12 (CYANOCOBALAMIN) 250 MCG tablet Take 1 tablet by mouth daily      calcium-vitamin D (OSCAL) 250-125 MG-UNIT per tablet Take 1 tablet by mouth daily       No current facility-administered medications for this visit.         Review of Systems:   Review of Systems as per HPI    Physical Exam   Vitals: /68   Pulse 82   Temp 97.8 °F (36.6 °C) (Temporal)   Ht 1.664 m (5' 5.5\")   Wt 68.2 kg (150 lb 6.4 oz)   SpO2 97%   BMI 24.65 kg/m²   Physical Exam  Vitals and nursing note reviewed.   Constitutional:       General: She is not in acute distress.     Appearance: Normal appearance.   HENT:      Head: Normocephalic and atraumatic.      Right Ear: Tympanic membrane and ear canal normal. There is no impacted cerumen.

## 2024-08-28 ENCOUNTER — HOSPITAL ENCOUNTER (OUTPATIENT)
Dept: MAMMOGRAPHY | Age: 74
Discharge: HOME | End: 2024-08-30
Attending: OBSTETRICS & GYNECOLOGY
Payer: MEDICARE

## 2024-08-28 DIAGNOSIS — Z12.31 VISIT FOR SCREENING MAMMOGRAM: ICD-10-CM

## 2024-08-28 PROCEDURE — 77063 BREAST TOMOSYNTHESIS BI: CPT

## 2024-10-16 RX ORDER — HYDROCHLOROTHIAZIDE 25 MG/1
25 TABLET ORAL DAILY
Qty: 90 TABLET | Refills: 1 | Status: SHIPPED | OUTPATIENT
Start: 2024-10-16

## 2024-10-16 NOTE — TELEPHONE ENCOUNTER
Pt called requesting refill for hydrochlorothiazide 25mg sent to [facility] pharmacy. Rx pended. Pt took her last one today. Last Appointment:  5/22/2024  No future appointments.

## 2024-10-28 DIAGNOSIS — E03.9 HYPOTHYROIDISM, UNSPECIFIED TYPE: ICD-10-CM

## 2024-10-28 RX ORDER — LEVOTHYROXINE SODIUM 125 UG/1
125 TABLET ORAL DAILY
Qty: 90 TABLET | Refills: 3 | Status: CANCELLED | OUTPATIENT
Start: 2024-10-28

## 2024-10-29 DIAGNOSIS — E03.9 HYPOTHYROIDISM, UNSPECIFIED TYPE: ICD-10-CM

## 2024-10-29 RX ORDER — LEVOTHYROXINE SODIUM 125 UG/1
125 TABLET ORAL DAILY
Qty: 30 TABLET | Refills: 1 | Status: SHIPPED | OUTPATIENT
Start: 2024-10-29

## 2024-10-29 NOTE — TELEPHONE ENCOUNTER
Please notify patient I recommend checking another TSH as it has been almost 6 months since checking her last TSH.  I will send in the higher dose if she accepts the higher cardiovascular risk of an overtreated thyroid.

## 2024-10-29 NOTE — TELEPHONE ENCOUNTER
Name of Medication(s) Requested:  Requested Prescriptions     Pending Prescriptions Disp Refills    levothyroxine (SYNTHROID) 125 MCG tablet 90 tablet 0     Sig: Take 1 tablet by mouth daily       Medication is on current medication list Yes    Dosage and directions were verified? Yes    Quantity verified: 90 day supply     Pharmacy Verified?  Yes    Last Appointment:  5/22/2024    Future appts:  No future appointments.     (If no appt send self scheduling link. .REFILLAPPT)  Scheduling request sent?     [] Yes  [x] No    Does patient need updated?  [] Yes  [x] No

## 2024-10-30 NOTE — TELEPHONE ENCOUNTER
Patient notified and states she understands the risk and wants the higher does as she \" has been feeling more fatigue\". She will have her labs done 11/04/2024.

## 2024-11-13 DIAGNOSIS — E03.9 HYPOTHYROIDISM, UNSPECIFIED TYPE: ICD-10-CM

## 2024-11-13 LAB — TSH SERPL DL<=0.05 MIU/L-ACNC: 0.02 UIU/ML

## 2024-12-18 ENCOUNTER — OFFICE VISIT (OUTPATIENT)
Dept: PRIMARY CARE CLINIC | Age: 74
End: 2024-12-18
Payer: COMMERCIAL

## 2024-12-18 VITALS
OXYGEN SATURATION: 99 % | WEIGHT: 154 LBS | DIASTOLIC BLOOD PRESSURE: 90 MMHG | SYSTOLIC BLOOD PRESSURE: 146 MMHG | HEART RATE: 76 BPM | HEIGHT: 66 IN | BODY MASS INDEX: 24.75 KG/M2 | TEMPERATURE: 98 F

## 2024-12-18 DIAGNOSIS — E03.9 HYPOTHYROIDISM, UNSPECIFIED TYPE: ICD-10-CM

## 2024-12-18 DIAGNOSIS — I47.10 SVT (SUPRAVENTRICULAR TACHYCARDIA) (HCC): ICD-10-CM

## 2024-12-18 DIAGNOSIS — Z00.00 INITIAL MEDICARE ANNUAL WELLNESS VISIT: Primary | ICD-10-CM

## 2024-12-18 PROCEDURE — 1159F MED LIST DOCD IN RCRD: CPT | Performed by: FAMILY MEDICINE

## 2024-12-18 PROCEDURE — 3077F SYST BP >= 140 MM HG: CPT | Performed by: FAMILY MEDICINE

## 2024-12-18 PROCEDURE — 3080F DIAST BP >= 90 MM HG: CPT | Performed by: FAMILY MEDICINE

## 2024-12-18 PROCEDURE — 1160F RVW MEDS BY RX/DR IN RCRD: CPT | Performed by: FAMILY MEDICINE

## 2024-12-18 PROCEDURE — G8484 FLU IMMUNIZE NO ADMIN: HCPCS | Performed by: FAMILY MEDICINE

## 2024-12-18 PROCEDURE — G0438 PPPS, INITIAL VISIT: HCPCS | Performed by: FAMILY MEDICINE

## 2024-12-18 PROCEDURE — 1123F ACP DISCUSS/DSCN MKR DOCD: CPT | Performed by: FAMILY MEDICINE

## 2024-12-18 RX ORDER — LEVOTHYROXINE SODIUM 125 UG/1
125 TABLET ORAL DAILY
Qty: 90 TABLET | Refills: 1 | Status: SHIPPED | OUTPATIENT
Start: 2024-12-18

## 2024-12-18 ASSESSMENT — PATIENT HEALTH QUESTIONNAIRE - PHQ9
2. FEELING DOWN, DEPRESSED OR HOPELESS: NOT AT ALL
SUM OF ALL RESPONSES TO PHQ QUESTIONS 1-9: 0
1. LITTLE INTEREST OR PLEASURE IN DOING THINGS: NOT AT ALL
SUM OF ALL RESPONSES TO PHQ QUESTIONS 1-9: 0
SUM OF ALL RESPONSES TO PHQ9 QUESTIONS 1 & 2: 0

## 2024-12-18 ASSESSMENT — LIFESTYLE VARIABLES
HOW MANY STANDARD DRINKS CONTAINING ALCOHOL DO YOU HAVE ON A TYPICAL DAY: PATIENT DOES NOT DRINK
HOW OFTEN DO YOU HAVE A DRINK CONTAINING ALCOHOL: NEVER

## 2024-12-18 NOTE — PROGRESS NOTES
Medicare Annual Wellness Visit    Marie Meehan is here for Medicare AWV (Pt has no complaints today.)    Assessment & Plan   Initial Medicare annual wellness visit  SVT (supraventricular tachycardia) (HCC)  Hypothyroidism, unspecified type  -     levothyroxine (SYNTHROID) 125 MCG tablet; Take 1 tablet by mouth daily, Disp-90 tablet, R-1Normal    Recommendations for Preventive Services Due: see orders and patient instructions/AVS.  Recommended screening schedule for the next 5-10 years is provided to the patient in written form: see Patient Instructions/AVS.     Return in 6 months (on 6/18/2025) for 6 months follow up HTN, Medicare Annual Wellness Visit in 1 year.     Subjective   The following acute and/or chronic problems were also addressed today:  SVT  Controlled  Doing well on current medication  Continue current medication  Follow-up 1 year    Patient's complete Health Risk Assessment and screening values have been reviewed and are found in Flowsheets. The following problems were reviewed today and where indicated follow up appointments were made and/or referrals ordered.    Positive Risk Factor Screenings with Interventions:     Cognitive:   Clock Drawing Test (CDT): (!) Abnormal        Total Score Interpretation: Abnormal Mini-Cog  Interventions:  Refused 3 word recall test, does have diagnosed [place]'s disease.            Inactivity:  On average, how many days per week do you engage in moderate to strenuous exercise (like a brisk walk)?: 0 days (!) Abnormal  On average, how many minutes do you engage in exercise at this level?: 0 min  Interventions:  Patient declined any further interventions or treatment        Vision Screen:  Do you have difficulty driving, watching TV, or doing any of your daily activities because of your eyesight?: No  Have you had an eye exam within the past year?: (!) No  Interventions:   Patient encouraged to make appointment with their eye specialist    Safety:  Do you

## 2025-02-22 ENCOUNTER — HOSPITAL ENCOUNTER (EMERGENCY)
Age: 75
Discharge: HOME | End: 2025-02-22
Attending: STUDENT IN AN ORGANIZED HEALTH CARE EDUCATION/TRAINING PROGRAM
Payer: MEDICARE

## 2025-02-22 ENCOUNTER — APPOINTMENT (OUTPATIENT)
Dept: GENERAL RADIOLOGY | Age: 75
End: 2025-02-22
Payer: MEDICARE

## 2025-02-22 VITALS
HEART RATE: 94 BPM | SYSTOLIC BLOOD PRESSURE: 145 MMHG | RESPIRATION RATE: 18 BRPM | OXYGEN SATURATION: 97 % | TEMPERATURE: 97 F | DIASTOLIC BLOOD PRESSURE: 86 MMHG

## 2025-02-22 DIAGNOSIS — R07.9 CHEST PAIN, UNSPECIFIED TYPE: Primary | ICD-10-CM

## 2025-02-22 LAB
ANION GAP SERPL CALCULATED.3IONS-SCNC: 12 MMOL/L (ref 7–16)
BASOPHILS # BLD: 0.04 K/UL (ref 0–0.2)
BASOPHILS NFR BLD: 1 % (ref 0–2)
BNP SERPL-MCNC: 184 PG/ML (ref 0–450)
BUN SERPL-MCNC: 20 MG/DL (ref 6–23)
CALCIUM SERPL-MCNC: 10 MG/DL (ref 8.6–10.2)
CHLORIDE SERPL-SCNC: 101 MMOL/L (ref 98–107)
CO2 SERPL-SCNC: 29 MMOL/L (ref 22–29)
CREAT SERPL-MCNC: 1 MG/DL (ref 0.5–1)
D-DIMER QUANTITATIVE: <200 NG/ML DDU (ref 0–230)
EOSINOPHIL # BLD: 0.22 K/UL (ref 0.05–0.5)
EOSINOPHILS RELATIVE PERCENT: 4 % (ref 0–6)
ERYTHROCYTE [DISTWIDTH] IN BLOOD BY AUTOMATED COUNT: 13 % (ref 11.5–15)
GFR, ESTIMATED: 62 ML/MIN/1.73M2
GLUCOSE SERPL-MCNC: 145 MG/DL (ref 74–99)
HCT VFR BLD AUTO: 37.6 % (ref 34–48)
HGB BLD-MCNC: 12.7 G/DL (ref 11.5–15.5)
IMM GRANULOCYTES # BLD AUTO: <0.03 K/UL (ref 0–0.58)
IMM GRANULOCYTES NFR BLD: 0 % (ref 0–5)
LYMPHOCYTES NFR BLD: 1.16 K/UL (ref 1.5–4)
LYMPHOCYTES RELATIVE PERCENT: 21 % (ref 20–42)
MAGNESIUM SERPL-MCNC: 2.1 MG/DL (ref 1.6–2.6)
MCH RBC QN AUTO: 30.2 PG (ref 26–35)
MCHC RBC AUTO-ENTMCNC: 33.8 G/DL (ref 32–34.5)
MCV RBC AUTO: 89.3 FL (ref 80–99.9)
MONOCYTES NFR BLD: 0.57 K/UL (ref 0.1–0.95)
MONOCYTES NFR BLD: 10 % (ref 2–12)
NEUTROPHILS NFR BLD: 64 % (ref 43–80)
NEUTS SEG NFR BLD: 3.66 K/UL (ref 1.8–7.3)
PLATELET # BLD AUTO: 239 K/UL (ref 130–450)
PMV BLD AUTO: 11.1 FL (ref 7–12)
POTASSIUM SERPL-SCNC: 3.6 MMOL/L (ref 3.5–5)
RBC # BLD AUTO: 4.21 M/UL (ref 3.5–5.5)
SODIUM SERPL-SCNC: 142 MMOL/L (ref 132–146)
TROPONIN I SERPL HS-MCNC: 14 NG/L (ref 0–9)
TROPONIN I SERPL HS-MCNC: 14 NG/L (ref 0–9)
WBC OTHER # BLD: 5.7 K/UL (ref 4.5–11.5)

## 2025-02-22 PROCEDURE — 99285 EMERGENCY DEPT VISIT HI MDM: CPT

## 2025-02-22 PROCEDURE — 80048 BASIC METABOLIC PNL TOTAL CA: CPT

## 2025-02-22 PROCEDURE — 83735 ASSAY OF MAGNESIUM: CPT

## 2025-02-22 PROCEDURE — 93005 ELECTROCARDIOGRAM TRACING: CPT

## 2025-02-22 PROCEDURE — 85025 COMPLETE CBC W/AUTO DIFF WBC: CPT

## 2025-02-22 PROCEDURE — 83880 ASSAY OF NATRIURETIC PEPTIDE: CPT

## 2025-02-22 PROCEDURE — 84484 ASSAY OF TROPONIN QUANT: CPT

## 2025-02-22 PROCEDURE — 71045 X-RAY EXAM CHEST 1 VIEW: CPT

## 2025-02-22 PROCEDURE — 85379 FIBRIN DEGRADATION QUANT: CPT

## 2025-02-22 RX ORDER — LIDOCAINE 50 MG/G
1 PATCH TOPICAL DAILY
Qty: 10 PATCH | Refills: 0 | Status: SHIPPED | OUTPATIENT
Start: 2025-02-22 | End: 2025-03-04

## 2025-02-22 ASSESSMENT — LIFESTYLE VARIABLES
HOW OFTEN DO YOU HAVE A DRINK CONTAINING ALCOHOL: NEVER
HOW MANY STANDARD DRINKS CONTAINING ALCOHOL DO YOU HAVE ON A TYPICAL DAY: PATIENT DOES NOT DRINK

## 2025-02-22 NOTE — ED PROVIDER NOTES
[facility] EMERGENCY DEPARTMENT  EMERGENCY DEPARTMENT ENCOUNTER        Pt Name: Marie Meehan  MRN: [id number]  Birthdate 1950  Date of evaluation: 2/22/2025  Provider: Canelo Whitley MD  PCP: Martina Trevino DO  Note Started: 1:44 PM EST 2/22/25    CHIEF COMPLAINT       Chief Complaint   Patient presents with    Chest Pain     Right sided chest/breast pain, x 2 days       HISTORY OF PRESENT ILLNESS: 1 or more Elements        Limitations to history : None    Marie Meehan is a 75 y.o. female who presents to the emergency room for some right-sided chest pain, some shortness of breath, ongoing for the last 2 days.  Does have a history of breast cancer previously.  Has a history of Ashley's disease as well.  No history of hypertension or hypercholesterolemia.  Denies any recent travel injuries or surgeries.  No history of blood clots.  Nothing seems make the chest pain better or worse.    Nursing Notes were all reviewed and agreed with or any disagreements were addressed in the HPI.      REVIEW OF EXTERNAL NOTE :       Relevant visit 12/5/2023 patient has a history of malignant neoplasm of female breast    REVIEW OF SYSTEMS :      Positives and Pertinent negatives as per HPI.     SURGICAL HISTORY     Past Surgical History:   Procedure Laterality Date    APPENDECTOMY      COLONOSCOPY      COLONOSCOPY N/A 5/28/2021    COLONOSCOPY DIAGNOSTIC performed by Yuridia Taveras MD at [facility] ENDOSCOPY    HERNIA REPAIR      HYSTERECTOMY (CERVIX STATUS UNKNOWN)      THYROID SURGERY      UPPER GASTROINTESTINAL ENDOSCOPY N/A 5/28/2021    EGD BIOPSY performed by Yuridia Taveras MD at [facility] ENDOSCOPY    US BREAST BIOPSY W LOC DEVICE 1ST LESION LEFT Left 6/26/2023    US BREAST BIOPSY W LOC DEVICE 1ST LESION LEFT 6/26/2023 [facility]       CURRENTMEDICATIONS       Previous Medications    ASCORBIC ACID (VITAMIN C) 500 MG TABLET    Take 2 tablets by mouth daily    CALCIUM CARBONATE-VITAMIN D PO

## 2025-02-23 LAB
EKG ATRIAL RATE: 86 BPM
EKG P AXIS: 38 DEGREES
EKG P-R INTERVAL: 144 MS
EKG Q-T INTERVAL: 400 MS
EKG QRS DURATION: 80 MS
EKG QTC CALCULATION (BAZETT): 478 MS
EKG R AXIS: -26 DEGREES
EKG T AXIS: 62 DEGREES
EKG VENTRICULAR RATE: 86 BPM

## 2025-02-23 NOTE — ED PROVIDER NOTES
75-year-old with [place]'s, GERD, left-sided breast cancer status post bilateral mastectomy and radiation.  Presents to the emerged part with concerns of chest pain of the right side.  She describes as a sharp-like sensation worsens with inspiration.  This been going on for the past 2 days.  Patient been taking daily baby aspirin but states she does not have a history of diabetes hypertension or hypercholesterolemia.  She denies the following: Cough, hemoptysis, fever, chills, Varinder pain, nausea, vomiting, diaphoresis, GI bleed.  No palliative measures otherwise taken no aggravating leaving factors noted.      Chief Complaint   Patient presents with    Chest Pain     Right sided chest/breast pain, x 2 days       Review of Systems   Pert stated in the hpi above   Physical Exam  Vitals reviewed.   Constitutional:       General: She is not in acute distress.     Appearance: She is not ill-appearing.   HENT:      Head: Normocephalic.      Right Ear: External ear normal.      Left Ear: External ear normal.      Nose: Nose normal.      Mouth/Throat:      Mouth: Mucous membranes are moist.   Eyes:      General:         Right eye: No discharge.         Left eye: No discharge.      Conjunctiva/sclera: Conjunctivae normal.   Cardiovascular:      Rate and Rhythm: Normal rate and regular rhythm.      Heart sounds:      No friction rub. No gallop.   Pulmonary:      Effort: No respiratory distress.      Breath sounds: No stridor.   Abdominal:      General: There is no distension.      Tenderness: There is no abdominal tenderness. There is no guarding or rebound.   Musculoskeletal:         General: No deformity or signs of injury.      Cervical back: Normal range of motion and neck supple. No rigidity or tenderness.   Skin:     Coloration: Skin is not jaundiced.   Neurological:      Mental Status: She is alert.      Sensory: No sensory deficit.      Motor: No weakness.   Psychiatric:         Mood and Affect: Mood normal.

## 2025-04-10 RX ORDER — HYDROCHLOROTHIAZIDE 25 MG/1
25 TABLET ORAL DAILY
Qty: 90 TABLET | Refills: 1 | Status: SHIPPED | OUTPATIENT
Start: 2025-04-10

## 2025-04-10 NOTE — TELEPHONE ENCOUNTER
Name of Medication(s) Requested:  Requested Prescriptions     Pending Prescriptions Disp Refills    hydroCHLOROthiazide (HYDRODIURIL) 25 MG tablet 90 tablet 1     Sig: Take 1 tablet by mouth daily       Medication is on current medication list Yes    Dosage and directions were verified? Yes    Quantity verified: 90 day supply     Pharmacy Verified?  Yes    Last Appointment:  12/18/2024    Future appts:  Future Appointments   Date Time Provider Department Center   6/18/2025 10:00 AM Martina Trevino, DO [facility] DEP   12/19/2025 10:00 AM Martina Trevino, DO [facility] DEP        (If no appt send self scheduling link. .REFILLAPPT)  Scheduling request sent?     [] Yes  [x] No    Does patient need updated?  [] Yes  [x] No

## 2025-06-15 ENCOUNTER — APPOINTMENT (OUTPATIENT)
Dept: GENERAL RADIOLOGY | Age: 75
DRG: 683 | End: 2025-06-15
Payer: MEDICARE

## 2025-06-15 ENCOUNTER — HOSPITAL ENCOUNTER (INPATIENT)
Age: 75
LOS: 1 days | Discharge: LEFT BEFORE DISCHARGE | DRG: 683 | End: 2025-06-15
Attending: STUDENT IN AN ORGANIZED HEALTH CARE EDUCATION/TRAINING PROGRAM | Admitting: INTERNAL MEDICINE
Payer: MEDICARE

## 2025-06-15 ENCOUNTER — APPOINTMENT (OUTPATIENT)
Dept: CT IMAGING | Age: 75
DRG: 683 | End: 2025-06-15
Payer: MEDICARE

## 2025-06-15 VITALS
HEART RATE: 93 BPM | HEIGHT: 66 IN | OXYGEN SATURATION: 98 % | BODY MASS INDEX: 24.11 KG/M2 | TEMPERATURE: 98.1 F | RESPIRATION RATE: 24 BRPM | DIASTOLIC BLOOD PRESSURE: 63 MMHG | WEIGHT: 150 LBS | SYSTOLIC BLOOD PRESSURE: 127 MMHG

## 2025-06-15 DIAGNOSIS — N17.9 AKI (ACUTE KIDNEY INJURY): ICD-10-CM

## 2025-06-15 DIAGNOSIS — R79.89 ELEVATED TROPONIN: ICD-10-CM

## 2025-06-15 DIAGNOSIS — Z53.29 LEFT AGAINST MEDICAL ADVICE: Primary | ICD-10-CM

## 2025-06-15 LAB
ALBUMIN SERPL-MCNC: 4.3 G/DL (ref 3.5–5.2)
ALP SERPL-CCNC: 138 U/L (ref 35–104)
ALT SERPL-CCNC: 30 U/L (ref 0–35)
ANION GAP SERPL CALCULATED.3IONS-SCNC: 15 MMOL/L (ref 7–16)
AST SERPL-CCNC: 58 U/L (ref 0–35)
BASOPHILS # BLD: 0.07 K/UL (ref 0–0.2)
BASOPHILS NFR BLD: 1 % (ref 0–2)
BILIRUB SERPL-MCNC: 0.5 MG/DL (ref 0–1.2)
BUN SERPL-MCNC: 46 MG/DL (ref 8–23)
CALCIUM SERPL-MCNC: 11.5 MG/DL (ref 8.8–10.2)
CHLORIDE SERPL-SCNC: 98 MMOL/L (ref 98–107)
CO2 SERPL-SCNC: 25 MMOL/L (ref 22–29)
CREAT SERPL-MCNC: 1.6 MG/DL (ref 0.5–1)
EKG ATRIAL RATE: 93 BPM
EKG P AXIS: 80 DEGREES
EKG P-R INTERVAL: 136 MS
EKG Q-T INTERVAL: 338 MS
EKG QRS DURATION: 80 MS
EKG QTC CALCULATION (BAZETT): 420 MS
EKG R AXIS: -11 DEGREES
EKG T AXIS: 46 DEGREES
EKG VENTRICULAR RATE: 93 BPM
EOSINOPHIL # BLD: 0.07 K/UL (ref 0.05–0.5)
EOSINOPHILS RELATIVE PERCENT: 1 % (ref 0–6)
ERYTHROCYTE [DISTWIDTH] IN BLOOD BY AUTOMATED COUNT: 15.5 % (ref 11.5–15)
FLUAV RNA RESP QL NAA+PROBE: NOT DETECTED
FLUBV RNA RESP QL NAA+PROBE: NOT DETECTED
GFR, ESTIMATED: 33 ML/MIN/1.73M2
GLUCOSE BLD-MCNC: 174 MG/DL (ref 74–99)
GLUCOSE SERPL-MCNC: 144 MG/DL (ref 74–99)
HCT VFR BLD AUTO: 30.5 % (ref 34–48)
HGB BLD-MCNC: 10.2 G/DL (ref 11.5–15.5)
LYMPHOCYTES NFR BLD: 1.02 K/UL (ref 1.5–4)
LYMPHOCYTES RELATIVE PERCENT: 12 % (ref 20–42)
MAGNESIUM SERPL-MCNC: 2.1 MG/DL (ref 1.6–2.4)
MCH RBC QN AUTO: 29.1 PG (ref 26–35)
MCHC RBC AUTO-ENTMCNC: 33.4 G/DL (ref 32–34.5)
MCV RBC AUTO: 86.9 FL (ref 80–99.9)
METAMYELOCYTES ABSOLUTE COUNT: 0.15 K/UL (ref 0–0.12)
METAMYELOCYTES: 2 % (ref 0–1)
MONOCYTES NFR BLD: 0.22 K/UL (ref 0.1–0.95)
MONOCYTES NFR BLD: 3 % (ref 2–12)
MYELOCYTES ABSOLUTE COUNT: 0.15 K/UL
MYELOCYTES: 2 %
NEUTROPHILS NFR BLD: 80 % (ref 43–80)
NEUTS SEG NFR BLD: 6.83 K/UL (ref 1.8–7.3)
NUCLEATED RED BLOOD CELLS: 2 PER 100 WBC
PLATELET # BLD AUTO: 204 K/UL (ref 130–450)
PMV BLD AUTO: 11.3 FL (ref 7–12)
POTASSIUM SERPL-SCNC: 4.3 MMOL/L (ref 3.5–5.1)
PROT SERPL-MCNC: 7.3 G/DL (ref 6.4–8.3)
RBC # BLD AUTO: 3.51 M/UL (ref 3.5–5.5)
RBC # BLD: ABNORMAL 10*6/UL
SARS-COV-2 RNA RESP QL NAA+PROBE: NOT DETECTED
SODIUM SERPL-SCNC: 138 MMOL/L (ref 136–145)
SOURCE: NORMAL
SPECIMEN DESCRIPTION: NORMAL
TROPONIN I SERPL HS-MCNC: 31 NG/L (ref 0–14)
TROPONIN I SERPL HS-MCNC: 39 NG/L (ref 0–14)
TSH SERPL DL<=0.05 MIU/L-ACNC: 0.08 UIU/ML (ref 0.27–4.2)
WBC OTHER # BLD: 8.5 K/UL (ref 4.5–11.5)

## 2025-06-15 PROCEDURE — 1200000000 HC SEMI PRIVATE

## 2025-06-15 PROCEDURE — 85025 COMPLETE CBC W/AUTO DIFF WBC: CPT

## 2025-06-15 PROCEDURE — 84443 ASSAY THYROID STIM HORMONE: CPT

## 2025-06-15 PROCEDURE — 83735 ASSAY OF MAGNESIUM: CPT

## 2025-06-15 PROCEDURE — 84484 ASSAY OF TROPONIN QUANT: CPT

## 2025-06-15 PROCEDURE — 71046 X-RAY EXAM CHEST 2 VIEWS: CPT

## 2025-06-15 PROCEDURE — 99285 EMERGENCY DEPT VISIT HI MDM: CPT

## 2025-06-15 PROCEDURE — 93010 ELECTROCARDIOGRAM REPORT: CPT | Performed by: INTERNAL MEDICINE

## 2025-06-15 PROCEDURE — 93005 ELECTROCARDIOGRAM TRACING: CPT

## 2025-06-15 PROCEDURE — 82962 GLUCOSE BLOOD TEST: CPT

## 2025-06-15 PROCEDURE — 87636 SARSCOV2 & INF A&B AMP PRB: CPT

## 2025-06-15 PROCEDURE — 80053 COMPREHEN METABOLIC PANEL: CPT

## 2025-06-15 PROCEDURE — 70450 CT HEAD/BRAIN W/O DYE: CPT

## 2025-06-15 RX ORDER — LANOLIN ALCOHOL/MO/W.PET/CERES
250 CREAM (GRAM) TOPICAL DAILY
Status: CANCELLED | OUTPATIENT
Start: 2025-06-15

## 2025-06-15 RX ORDER — ACETAMINOPHEN 325 MG/1
650 TABLET ORAL EVERY 6 HOURS PRN
Status: CANCELLED | OUTPATIENT
Start: 2025-06-15

## 2025-06-15 RX ORDER — ASCORBIC ACID 500 MG
1000 TABLET ORAL DAILY
Status: CANCELLED | OUTPATIENT
Start: 2025-06-15

## 2025-06-15 RX ORDER — CETIRIZINE HYDROCHLORIDE 10 MG/1
10 TABLET ORAL DAILY
Status: CANCELLED | OUTPATIENT
Start: 2025-06-15

## 2025-06-15 RX ORDER — SODIUM CHLORIDE 0.9 % (FLUSH) 0.9 %
5-40 SYRINGE (ML) INJECTION PRN
Status: CANCELLED | OUTPATIENT
Start: 2025-06-15

## 2025-06-15 RX ORDER — ONDANSETRON 4 MG/1
4 TABLET, ORALLY DISINTEGRATING ORAL EVERY 8 HOURS PRN
Status: CANCELLED | OUTPATIENT
Start: 2025-06-15

## 2025-06-15 RX ORDER — ENOXAPARIN SODIUM 100 MG/ML
30 INJECTION SUBCUTANEOUS DAILY
Status: CANCELLED | OUTPATIENT
Start: 2025-06-15

## 2025-06-15 RX ORDER — SODIUM CHLORIDE 9 MG/ML
INJECTION, SOLUTION INTRAVENOUS PRN
Status: CANCELLED | OUTPATIENT
Start: 2025-06-15

## 2025-06-15 RX ORDER — SODIUM CHLORIDE 9 MG/ML
INJECTION, SOLUTION INTRAVENOUS CONTINUOUS
Status: CANCELLED | OUTPATIENT
Start: 2025-06-15 | End: 2025-06-16

## 2025-06-15 RX ORDER — SODIUM CHLORIDE 0.9 % (FLUSH) 0.9 %
5-40 SYRINGE (ML) INJECTION EVERY 12 HOURS SCHEDULED
Status: CANCELLED | OUTPATIENT
Start: 2025-06-15

## 2025-06-15 RX ORDER — 0.9 % SODIUM CHLORIDE 0.9 %
1000 INTRAVENOUS SOLUTION INTRAVENOUS ONCE
Status: DISCONTINUED | OUTPATIENT
Start: 2025-06-15 | End: 2025-06-15 | Stop reason: HOSPADM

## 2025-06-15 RX ORDER — ONDANSETRON 2 MG/ML
4 INJECTION INTRAMUSCULAR; INTRAVENOUS EVERY 6 HOURS PRN
Status: CANCELLED | OUTPATIENT
Start: 2025-06-15

## 2025-06-15 RX ORDER — ACETAMINOPHEN 650 MG/1
650 SUPPOSITORY RECTAL EVERY 6 HOURS PRN
Status: CANCELLED | OUTPATIENT
Start: 2025-06-15

## 2025-06-15 RX ORDER — POLYETHYLENE GLYCOL 3350 17 G/17G
17 POWDER, FOR SOLUTION ORAL DAILY PRN
Status: CANCELLED | OUTPATIENT
Start: 2025-06-15

## 2025-06-16 ENCOUNTER — TELEPHONE (OUTPATIENT)
Dept: PRIMARY CARE CLINIC | Age: 75
End: 2025-06-16

## 2025-06-16 ENCOUNTER — CARE COORDINATION (OUTPATIENT)
Dept: CARE COORDINATION | Age: 75
End: 2025-06-16

## 2025-06-16 NOTE — TELEPHONE ENCOUNTER
Called patient to confirm appointment for wed 6/18. Pt questioned referral to nephrology. Looked in chart christiana advised patient to go back to ER and pt refused. Informed her Dr. Trevino did not see message yet regarding [facility] nephrology referral and informed her that referral to nephrology may take a while to get in. She said she feels weak and I again strongly advised patient to go back to ER and she said \" I know but I just don't want to be admitted\".

## 2025-06-16 NOTE — TELEPHONE ENCOUNTER
It looks like she had sudden onset kidney failure and they wanted her to stay to find out why.  Given that her kidney filtration was cut in half based on her blood work, I recommend going back to the ER and being admitted if necessary.

## 2025-06-16 NOTE — CARE COORDINATION
Care Transitions Note    Initial Call - Call within 2 business days of discharge: Yes    Attempted to reach patient for transitions of care follow up. Unable to reach patient.    Outreach Attempts:   HIPAA compliant voicemail left for patient.     Patient: Marie Meehan    Patient : 1950   MRN: [id number]    Reason for Admission: ARF  Discharge Date: 6/15/25  RURS: Readmission Risk Score: 15.1    Last Discharge Facility       Date Complaint Diagnosis Description Type Department Provider    6/15/25 Extremity Weakness Left against medical advice ... ED to Hosp-Admission (Discharged) (AMA) [facility] ED Osmany Reza MD; Bel[facility],...            Was this an external facility discharge? No    Attempted to contact patient today 25 for ED f/u (left AMA) for ARF. Left message on home/mobile number requesting a return call back to CTN and provided contact information. Noted patient scheduled for f/u with Dr. Trevino (PCP) on 25.     Jaye Lao APRN

## 2025-06-16 NOTE — TELEPHONE ENCOUNTER
Spoke with patient.  She refused to go back to ER and will not stay overnight.    She is requesting a STAT referral to nephrology.

## 2025-06-16 NOTE — ED PROVIDER NOTES
Patient Vitals for the past 24 hrs:   BP Temp Temp src Pulse Resp SpO2 Height Weight   06/15/25 1129 127/63 -- -- -- -- -- -- --   06/15/25 1059 -- -- -- 93 24 98 % -- --   06/15/25 1057 -- 98.1 °F (36.7 °C) Oral -- -- -- -- --   06/15/25 1055 -- -- -- -- -- -- 1.664 m (5' 5.5\") 68 kg (150 lb)     ------------------------------ PHYSICAL EXAM -----------------------------    Constitutional/General: Alert and oriented x3  Head: Normocephalic and atraumatic  Eyes: PERRL, EOMI, conjunctiva normal, sclera non icteric  ENT:  Oropharynx clear, handling secretions, no trismus, no asymmetry of the posterior oropharynx or uvular edema  Neck: Supple, full ROM, no stridor, no meningeal signs  Respiratory: Lungs clear to auscultation bilaterally, no wheezes, rales, or rhonchi. Not in respiratory distress  Cardiovascular:  Regular rate. Regular rhythm. Equal extremity pulses.   GI:  Abdomen Soft, Non tender, Non distended. No rebound, guarding, or rigidity.   Musculoskeletal: Moves all extremities x 4. Warm and well perfused, no clubbing, no cyanosis, no edema. Capillary refill <3 seconds  Integument: skin warm and dry. No rashes.   Neurologic: no focal deficits, CN II-XII grossly intact. 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   Psychiatric: Normal Affect    ------------------------------ DIAGNOSTIC RESULTS -----------------------------    I have personally interpreted all laboratory, EKG, and imaging results for this patient. Results are listed below.     LABS:  Labs Reviewed   COMPREHENSIVE METABOLIC PANEL - Abnormal; Notable for the following components:       Result Value    Glucose 144 (*)     BUN 46 (*)     Creatinine 1.6 (*)     Est, Glom Filt Rate 33 (*)     Calcium 11.5 (*)     Alkaline Phosphatase 138 (*)     AST 58 (*)     All other components within normal limits   CBC WITH AUTO DIFFERENTIAL - Abnormal; Notable for the

## 2025-06-16 NOTE — TELEPHONE ENCOUNTER
Patient called stating she was in the ER yesterday and they wanted her to stay but she left.  Patient is asking what they found and if she needs to do anything.

## 2025-06-17 ENCOUNTER — CARE COORDINATION (OUTPATIENT)
Dept: CARE COORDINATION | Age: 75
End: 2025-06-17

## 2025-06-17 ENCOUNTER — APPOINTMENT (OUTPATIENT)
Dept: ULTRASOUND IMAGING | Age: 75
DRG: 683 | End: 2025-06-17
Payer: MEDICARE

## 2025-06-17 ENCOUNTER — HOSPITAL ENCOUNTER (INPATIENT)
Age: 75
LOS: 2 days | Discharge: HOME | DRG: 683 | End: 2025-06-19
Attending: EMERGENCY MEDICINE | Admitting: INTERNAL MEDICINE
Payer: MEDICARE

## 2025-06-17 DIAGNOSIS — N17.9 AKI (ACUTE KIDNEY INJURY): Primary | ICD-10-CM

## 2025-06-17 PROBLEM — R53.83 FATIGUE: Status: ACTIVE | Noted: 2025-06-17

## 2025-06-17 PROBLEM — R79.89 ELEVATED TROPONIN LEVEL NOT DUE TO ACUTE CORONARY SYNDROME: Status: ACTIVE | Noted: 2025-06-17

## 2025-06-17 PROBLEM — E83.52 HYPERCALCEMIA: Status: ACTIVE | Noted: 2025-06-17

## 2025-06-17 LAB
ANION GAP SERPL CALCULATED.3IONS-SCNC: 16 MMOL/L (ref 7–16)
BACTERIA URNS QL MICRO: ABNORMAL
BASOPHILS # BLD: 0 K/UL (ref 0–0.2)
BASOPHILS NFR BLD: 0 % (ref 0–2)
BILIRUB UR QL STRIP: NEGATIVE
BUN SERPL-MCNC: 46 MG/DL (ref 8–23)
CALCIUM SERPL-MCNC: 11.9 MG/DL (ref 8.8–10.2)
CASTS #/AREA URNS LPF: ABNORMAL /LPF
CHLORIDE SERPL-SCNC: 94 MMOL/L (ref 98–107)
CLARITY UR: CLEAR
CO2 SERPL-SCNC: 24 MMOL/L (ref 22–29)
COLOR UR: YELLOW
CREAT SERPL-MCNC: 1.8 MG/DL (ref 0.5–1)
CRYSTALS URNS MICRO: ABNORMAL /HPF
EKG ATRIAL RATE: 82 BPM
EKG P AXIS: 40 DEGREES
EKG P-R INTERVAL: 146 MS
EKG Q-T INTERVAL: 378 MS
EKG QRS DURATION: 84 MS
EKG QTC CALCULATION (BAZETT): 441 MS
EKG R AXIS: -20 DEGREES
EKG T AXIS: 25 DEGREES
EKG VENTRICULAR RATE: 82 BPM
EOSINOPHIL # BLD: 0.16 K/UL (ref 0.05–0.5)
EOSINOPHILS RELATIVE PERCENT: 2 % (ref 0–6)
EPI CELLS #/AREA URNS HPF: ABNORMAL /HPF
ERYTHROCYTE [DISTWIDTH] IN BLOOD BY AUTOMATED COUNT: 15.9 % (ref 11.5–15)
GFR, ESTIMATED: 29 ML/MIN/1.73M2
GLUCOSE SERPL-MCNC: 122 MG/DL (ref 74–99)
GLUCOSE UR STRIP-MCNC: NEGATIVE MG/DL
HCT VFR BLD AUTO: 32.6 % (ref 34–48)
HGB BLD-MCNC: 10.8 G/DL (ref 11.5–15.5)
HGB UR QL STRIP.AUTO: NEGATIVE
KETONES UR STRIP-MCNC: NEGATIVE MG/DL
LEUKOCYTE ESTERASE UR QL STRIP: NEGATIVE
LYMPHOCYTES NFR BLD: 0.94 K/UL (ref 1.5–4)
LYMPHOCYTES RELATIVE PERCENT: 10 % (ref 20–42)
MCH RBC QN AUTO: 29 PG (ref 26–35)
MCHC RBC AUTO-ENTMCNC: 33.1 G/DL (ref 32–34.5)
MCV RBC AUTO: 87.4 FL (ref 80–99.9)
METAMYELOCYTES ABSOLUTE COUNT: 0.16 K/UL (ref 0–0.12)
METAMYELOCYTES: 2 % (ref 0–1)
MONOCYTES NFR BLD: 0.31 K/UL (ref 0.1–0.95)
MONOCYTES NFR BLD: 4 % (ref 2–12)
MYELOCYTES ABSOLUTE COUNT: 0.16 K/UL
MYELOCYTES: 2 %
NEUTROPHILS NFR BLD: 81 % (ref 43–80)
NEUTS SEG NFR BLD: 7.28 K/UL (ref 1.8–7.3)
NITRITE UR QL STRIP: NEGATIVE
NUCLEATED RED BLOOD CELLS: 2 PER 100 WBC
PH UR STRIP: 6 [PH] (ref 5–8)
PLATELET # BLD AUTO: 212 K/UL (ref 130–450)
PMV BLD AUTO: 11.3 FL (ref 7–12)
POTASSIUM SERPL-SCNC: 4.2 MMOL/L (ref 3.5–5.1)
PROT UR STRIP-MCNC: NEGATIVE MG/DL
RBC # BLD AUTO: 3.73 M/UL (ref 3.5–5.5)
RBC # BLD: ABNORMAL 10*6/UL
RBC #/AREA URNS HPF: ABNORMAL /HPF
SODIUM SERPL-SCNC: 135 MMOL/L (ref 136–145)
SP GR UR STRIP: 1.02 (ref 1–1.03)
TROPONIN I SERPL HS-MCNC: 24 NG/L (ref 0–14)
UROBILINOGEN UR STRIP-ACNC: 0.2 EU/DL (ref 0–1)
WBC #/AREA URNS HPF: ABNORMAL /HPF
WBC OTHER # BLD: 9 K/UL (ref 4.5–11.5)

## 2025-06-17 PROCEDURE — 81001 URINALYSIS AUTO W/SCOPE: CPT

## 2025-06-17 PROCEDURE — 96361 HYDRATE IV INFUSION ADD-ON: CPT

## 2025-06-17 PROCEDURE — 2580000003 HC RX 258: Performed by: EMERGENCY MEDICINE

## 2025-06-17 PROCEDURE — 93010 ELECTROCARDIOGRAM REPORT: CPT | Performed by: INTERNAL MEDICINE

## 2025-06-17 PROCEDURE — 99223 1ST HOSP IP/OBS HIGH 75: CPT | Performed by: INTERNAL MEDICINE

## 2025-06-17 PROCEDURE — 99285 EMERGENCY DEPT VISIT HI MDM: CPT

## 2025-06-17 PROCEDURE — 96360 HYDRATION IV INFUSION INIT: CPT

## 2025-06-17 PROCEDURE — 76770 US EXAM ABDO BACK WALL COMP: CPT

## 2025-06-17 PROCEDURE — 2580000003 HC RX 258: Performed by: INTERNAL MEDICINE

## 2025-06-17 PROCEDURE — 93005 ELECTROCARDIOGRAM TRACING: CPT | Performed by: EMERGENCY MEDICINE

## 2025-06-17 PROCEDURE — 84484 ASSAY OF TROPONIN QUANT: CPT

## 2025-06-17 PROCEDURE — 85025 COMPLETE CBC W/AUTO DIFF WBC: CPT

## 2025-06-17 PROCEDURE — 80048 BASIC METABOLIC PNL TOTAL CA: CPT

## 2025-06-17 PROCEDURE — 1200000000 HC SEMI PRIVATE

## 2025-06-17 PROCEDURE — 6360000002 HC RX W HCPCS: Performed by: INTERNAL MEDICINE

## 2025-06-17 PROCEDURE — 2500000003 HC RX 250 WO HCPCS: Performed by: INTERNAL MEDICINE

## 2025-06-17 RX ORDER — SODIUM CHLORIDE 0.9 % (FLUSH) 0.9 %
5-40 SYRINGE (ML) INJECTION EVERY 12 HOURS SCHEDULED
Status: DISCONTINUED | OUTPATIENT
Start: 2025-06-17 | End: 2025-06-19 | Stop reason: HOSPADM

## 2025-06-17 RX ORDER — ASPIRIN 81 MG/1
81 TABLET ORAL DAILY
Status: DISCONTINUED | OUTPATIENT
Start: 2025-06-18 | End: 2025-06-19 | Stop reason: HOSPADM

## 2025-06-17 RX ORDER — LANOLIN ALCOHOL/MO/W.PET/CERES
250 CREAM (GRAM) TOPICAL DAILY
Status: DISCONTINUED | OUTPATIENT
Start: 2025-06-18 | End: 2025-06-19 | Stop reason: HOSPADM

## 2025-06-17 RX ORDER — LEVOTHYROXINE SODIUM 125 UG/1
125 TABLET ORAL DAILY
Status: DISCONTINUED | OUTPATIENT
Start: 2025-06-18 | End: 2025-06-19 | Stop reason: HOSPADM

## 2025-06-17 RX ORDER — LANOLIN ALCOHOL/MO/W.PET/CERES
100 CREAM (GRAM) TOPICAL DAILY
Status: DISCONTINUED | OUTPATIENT
Start: 2025-06-18 | End: 2025-06-19 | Stop reason: HOSPADM

## 2025-06-17 RX ORDER — ASCORBIC ACID 500 MG
1000 TABLET ORAL DAILY
Status: DISCONTINUED | OUTPATIENT
Start: 2025-06-18 | End: 2025-06-19 | Stop reason: HOSPADM

## 2025-06-17 RX ORDER — ENOXAPARIN SODIUM 100 MG/ML
30 INJECTION SUBCUTANEOUS DAILY
Status: DISCONTINUED | OUTPATIENT
Start: 2025-06-17 | End: 2025-06-18

## 2025-06-17 RX ORDER — VITAMIN E 268 MG
400 CAPSULE ORAL DAILY
Status: DISCONTINUED | OUTPATIENT
Start: 2025-06-18 | End: 2025-06-19 | Stop reason: HOSPADM

## 2025-06-17 RX ORDER — 0.9 % SODIUM CHLORIDE 0.9 %
1000 INTRAVENOUS SOLUTION INTRAVENOUS ONCE
Status: COMPLETED | OUTPATIENT
Start: 2025-06-17 | End: 2025-06-17

## 2025-06-17 RX ORDER — ACETAMINOPHEN 325 MG/1
650 TABLET ORAL EVERY 6 HOURS PRN
Status: DISCONTINUED | OUTPATIENT
Start: 2025-06-17 | End: 2025-06-19 | Stop reason: HOSPADM

## 2025-06-17 RX ORDER — ASPIRIN 81 MG/1
81 TABLET ORAL DAILY
COMMUNITY

## 2025-06-17 RX ORDER — ONDANSETRON 2 MG/ML
4 INJECTION INTRAMUSCULAR; INTRAVENOUS EVERY 6 HOURS PRN
Status: DISCONTINUED | OUTPATIENT
Start: 2025-06-17 | End: 2025-06-19 | Stop reason: HOSPADM

## 2025-06-17 RX ORDER — ACETAMINOPHEN 500 MG
500 TABLET ORAL EVERY 6 HOURS PRN
COMMUNITY

## 2025-06-17 RX ORDER — ACETAMINOPHEN 650 MG/1
650 SUPPOSITORY RECTAL EVERY 6 HOURS PRN
Status: DISCONTINUED | OUTPATIENT
Start: 2025-06-17 | End: 2025-06-19 | Stop reason: HOSPADM

## 2025-06-17 RX ORDER — LOSARTAN POTASSIUM 50 MG/1
25 TABLET ORAL DAILY
Status: DISCONTINUED | OUTPATIENT
Start: 2025-06-18 | End: 2025-06-19 | Stop reason: HOSPADM

## 2025-06-17 RX ORDER — CETIRIZINE HYDROCHLORIDE 10 MG/1
5 TABLET ORAL DAILY
Status: DISCONTINUED | OUTPATIENT
Start: 2025-06-18 | End: 2025-06-19 | Stop reason: HOSPADM

## 2025-06-17 RX ORDER — SODIUM CHLORIDE 0.9 % (FLUSH) 0.9 %
5-40 SYRINGE (ML) INJECTION PRN
Status: DISCONTINUED | OUTPATIENT
Start: 2025-06-17 | End: 2025-06-19 | Stop reason: HOSPADM

## 2025-06-17 RX ORDER — SODIUM CHLORIDE 9 MG/ML
INJECTION, SOLUTION INTRAVENOUS CONTINUOUS
Status: DISCONTINUED | OUTPATIENT
Start: 2025-06-17 | End: 2025-06-18

## 2025-06-17 RX ORDER — SODIUM CHLORIDE 9 MG/ML
INJECTION, SOLUTION INTRAVENOUS PRN
Status: DISCONTINUED | OUTPATIENT
Start: 2025-06-17 | End: 2025-06-19 | Stop reason: HOSPADM

## 2025-06-17 RX ORDER — ONDANSETRON 4 MG/1
4 TABLET, ORALLY DISINTEGRATING ORAL EVERY 8 HOURS PRN
Status: DISCONTINUED | OUTPATIENT
Start: 2025-06-17 | End: 2025-06-19 | Stop reason: HOSPADM

## 2025-06-17 RX ADMIN — SODIUM CHLORIDE, PRESERVATIVE FREE 10 ML: 5 INJECTION INTRAVENOUS at 21:00

## 2025-06-17 RX ADMIN — ENOXAPARIN SODIUM 30 MG: 100 INJECTION SUBCUTANEOUS at 21:04

## 2025-06-17 RX ADMIN — SODIUM CHLORIDE: 0.9 INJECTION, SOLUTION INTRAVENOUS at 20:59

## 2025-06-17 RX ADMIN — SODIUM CHLORIDE 1000 ML: 0.9 INJECTION, SOLUTION INTRAVENOUS at 15:16

## 2025-06-17 NOTE — TELEPHONE ENCOUNTER
I see patient is back in the ER and her kidney function is worse.  I strongly advise patient to be admitted to the hospital and see a kidney specialist while inpatient, this will allow her to get treatment and then get into an outpatient nephrologist office sooner.

## 2025-06-17 NOTE — H&P
History and Physical      CHIEF COMPLAINT:    Chief Complaint   Patient presents with    Fatigue     Pt. Was seen Sunday and signed AMA, pt. Has returned with the same sx.           HISTORY OF PRESENT ILLNESS:      The patient is a 75 y.o. female patient of Martina Trevino DO   has a past medical history of Breast cancer (HCC), Cancer (HCC), Colon cancer screening, GERD (gastroesophageal reflux disease), [place] disease (HCC), and Thyroid disease.   who presents with fatigue.  Patient was evaluated in the ED on Sunday for the same complaint.  At that time her creatinine was elevated and she was recommended for admission.  Patient left AGAINST MEDICAL ADVICE.  Returns with same complaint of fatigue.  She denies any chest pain or trouble breathing.  No vomiting or diarrhea.  No fevers or chills.  No cough or swelling.  No exacerbation relief.  States she is eating and drinking okay.  Denies NSAID use    Emergency department vital signs 98.1, 16, 89, 1/24/1980, 96% room air.  Laboratory evaluation significant for sodium 135, BUN 46, creatinine 1.8, calcium 11.9, at bedtime troponin 24, hemoglobin 10.8.UA is unremarkable.  CT head from 6/15 is negative.  Chest x-ray from 6/15 shows no acute disease.  In the emergency department today patient's treated with 1 L of normal saline.  She will be admitted for further evaluation and treatment.        Past Medical History:    Past Medical History:   Diagnosis Date    Breast cancer (HCC)     Cancer (HCC) 06/26/2023    Left breast IDC, ER positive.    Colon cancer screening     GERD (gastroesophageal reflux disease)     [place] disease (HCC)     Thyroid disease        Past Surgical History:    Past Surgical History:   Procedure Laterality Date    APPENDECTOMY      COLONOSCOPY      COLONOSCOPY N/A 5/28/2021    COLONOSCOPY DIAGNOSTIC performed by Yuridia Taveras MD at [facility] ENDOSCOPY    HERNIA REPAIR      HYSTERECTOMY (CERVIX STATUS UNKNOWN)      THYROID SURGERY

## 2025-06-17 NOTE — ED PROVIDER NOTES
[facility] EMERGENCY DEPARTMENT  EMERGENCY DEPARTMENT ENCOUNTER        Pt Name: Marie Meehan  MRN: [id number]  Birthdate 1950  Date of evaluation: 6/17/2025  Provider: Garett Rosario DO  PCP: Martina Trevino DO  Note Started: 2:22 PM EDT 6/17/25    CHIEF COMPLAINT       Chief Complaint   Patient presents with    Fatigue     Pt. Was seen Sunday and signed AMA, pt. Has returned with the same sx.        HISTORY OF PRESENT ILLNESS: 1 or more Elements   History From: patient    Limitations to history : None    Marie Meehan is a 75 y.o. female who presents to the ED for evaluation of fatigue.  Patient states that she was seen here on Sunday for the same complaint.  At that time her troponin was elevated but had a negative delta.  She also was found to have acute renal insufficiency.  Baseline around 1 with a creatinine of 1.6 on that date.  They wanted her to stay for further treatment evaluation however patient left AGAINST MEDICAL ADVICE.  She talked to her PCP and her cardiologist who then advised that she should come back to the hospital to be evaluated.  Patient has no other complaints other than fatigue.  She states she has been drinking fluids.  No nausea, vomiting, or diarrhea.  No chest pain or shortness of breath.  Denies any muscle aches or cramping.  Denies any focal weakness or numbness.    Nursing Notes were all reviewed and agreed with or any disagreements were addressed in the HPI.      REVIEW OF EXTERNAL NOTE :        REVIEW OF SYSTEMS :           Positives and Pertinent negatives as per HPI.     SURGICAL HISTORY     Past Surgical History:   Procedure Laterality Date    APPENDECTOMY      COLONOSCOPY      COLONOSCOPY N/A 5/28/2021    COLONOSCOPY DIAGNOSTIC performed by Yuridia Taveras MD at [facility] ENDOSCOPY    HERNIA REPAIR      HYSTERECTOMY (CERVIX STATUS UNKNOWN)      THYROID SURGERY      UPPER GASTROINTESTINAL ENDOSCOPY N/A 5/28/2021    EGD BIOPSY  ORDERING SYSTEM PROVIDED HISTORY: weakness TECHNOLOGIST PROVIDED HISTORY: Has a \"code stroke\" or \"stroke alert\" been called?->No Reason for exam:->weakness FINDINGS: BRAIN/VENTRICLES: There is no acute intracranial hemorrhage, mass effect or midline shift.  No abnormal extra-axial fluid collection.  The gray-white differentiation is maintained without evidence of an acute infarct.  There is no evidence of hydrocephalus. ORBITS: The visualized portion of the orbits demonstrate no acute abnormality. SINUSES: The visualized paranasal sinuses and mastoid air cells demonstrate no acute abnormality. SOFT TISSUES/SKULL:  No acute abnormality of the visualized skull or soft tissues.     There is no acute intracranial hemorrhage or acute intracranial abnormality.       No results found.    PROCEDURES   Unless otherwise noted below, none          CRITICAL CARE TIME (.cct)   NA    PAST MEDICAL HISTORY/Chronic Conditions Affecting Care      has a past medical history of Breast cancer (HCC), Cancer (HCC) (06/26/2023), Colon cancer screening, GERD (gastroesophageal reflux disease), [place] disease (HCC), and Thyroid disease.     EMERGENCY DEPARTMENT COURSE    Vitals:    Vitals:    06/17/25 1402   BP: 124/80   Pulse: 89   Resp: 16   Temp: 98.1 °F (36.7 °C)   SpO2: 96%       Patient was given the following medications:  Medications   sodium chloride 0.9 % bolus 1,000 mL (1,000 mLs IntraVENous New Bag 6/17/25 1516)         Medical Decision Making/Differential Diagnosis:    CC/HPI Summary, Social Determinants of health, Records Reviewed, DDx, testing done/not done, ED Course, Reassessment, disposition considerations/shared decision making with patient, consults, disposition:      ED Course as of 06/17/25 1709   Tue Jun 17, 2025   1645 Discussed with patient that her kidney function has further to decline.  She was given a liter of normal saline in ED.  She is agreeable to admission for further treatment evaluation [MS]   1702

## 2025-06-17 NOTE — CARE COORDINATION
Care Transitions Note    Initial Call - Call within 2 business days of discharge: Yes    Attempted to reach patient for transitions of care follow up. Unable to reach patient.    Outreach Attempts:   Multiple attempts to contact patient at phone numbers on file.     Patient: Marie Meehan    Patient : 1950   MRN: [id number]    Reason for Admission: ARF  Discharge Date: 6/15/25  RURS: Readmission Risk Score: 15.1    Last Discharge Facility       Date Complaint Diagnosis Description Type Department Provider    6/15/25 Extremity Weakness Left against medical advice ... ED to Hosp-Admission (Discharged) (AMA) [facility] ED Osmany Reza MD; Belén,...            Was this an external facility discharge? No    Second attempt made today 25 for HUSSAIN/ED discharge follow up. Left message on home/mobile number requesting a return call back ot CTN and provided contact information. CTN signing off if no return call.     JAMEL Galloway

## 2025-06-18 PROBLEM — E05.90 HYPERTHYROIDISM: Status: ACTIVE | Noted: 2025-06-18

## 2025-06-18 LAB
25(OH)D3 SERPL-MCNC: 85.6 NG/ML (ref 30–100)
ALBUMIN: 3.5 G/DL (ref 3.5–5.2)
ANION GAP SERPL CALCULATED.3IONS-SCNC: 12 MMOL/L (ref 7–16)
BUN SERPL-MCNC: 41 MG/DL (ref 8–23)
CALCIUM SERPL-MCNC: 9.6 MG/DL (ref 8.8–10.2)
CHLORIDE SERPL-SCNC: 103 MMOL/L (ref 98–107)
CO2 SERPL-SCNC: 23 MMOL/L (ref 22–29)
CREAT SERPL-MCNC: 1.4 MG/DL (ref 0.5–1)
CREAT UR-MCNC: 59 MG/DL (ref 29–226)
GFR, ESTIMATED: 39 ML/MIN/1.73M2
GLUCOSE SERPL-MCNC: 85 MG/DL (ref 74–99)
PHOSPHATE SERPL-MCNC: 3.3 MG/DL (ref 2.5–4.5)
POTASSIUM SERPL-SCNC: 3.6 MMOL/L (ref 3.5–5.1)
PTH-INTACT SERPL-MCNC: 7 PG/ML (ref 15–65)
SODIUM SERPL-SCNC: 137 MMOL/L (ref 136–145)
SODIUM UR-SCNC: 43 MMOL/L
T4 FREE SERPL-MCNC: 2 NG/DL (ref 0.9–1.7)
TSH SERPL DL<=0.05 MIU/L-ACNC: 0.07 UIU/ML (ref 0.27–4.2)
UUN UR-MCNC: 453 MG/DL (ref 800–1666)

## 2025-06-18 PROCEDURE — 6370000000 HC RX 637 (ALT 250 FOR IP): Performed by: INTERNAL MEDICINE

## 2025-06-18 PROCEDURE — 82306 VITAMIN D 25 HYDROXY: CPT

## 2025-06-18 PROCEDURE — 97161 PT EVAL LOW COMPLEX 20 MIN: CPT

## 2025-06-18 PROCEDURE — 84300 ASSAY OF URINE SODIUM: CPT

## 2025-06-18 PROCEDURE — 99232 SBSQ HOSP IP/OBS MODERATE 35: CPT | Performed by: INTERNAL MEDICINE

## 2025-06-18 PROCEDURE — 83970 ASSAY OF PARATHORMONE: CPT

## 2025-06-18 PROCEDURE — 80069 RENAL FUNCTION PANEL: CPT

## 2025-06-18 PROCEDURE — 6360000002 HC RX W HCPCS: Performed by: INTERNAL MEDICINE

## 2025-06-18 PROCEDURE — 1200000000 HC SEMI PRIVATE

## 2025-06-18 PROCEDURE — 84540 ASSAY OF URINE/UREA-N: CPT

## 2025-06-18 PROCEDURE — 84439 ASSAY OF FREE THYROXINE: CPT

## 2025-06-18 PROCEDURE — 97535 SELF CARE MNGMENT TRAINING: CPT

## 2025-06-18 PROCEDURE — 97165 OT EVAL LOW COMPLEX 30 MIN: CPT

## 2025-06-18 PROCEDURE — 82570 ASSAY OF URINE CREATININE: CPT

## 2025-06-18 PROCEDURE — 97116 GAIT TRAINING THERAPY: CPT

## 2025-06-18 PROCEDURE — 2500000003 HC RX 250 WO HCPCS: Performed by: INTERNAL MEDICINE

## 2025-06-18 PROCEDURE — 36415 COLL VENOUS BLD VENIPUNCTURE: CPT

## 2025-06-18 PROCEDURE — 84443 ASSAY THYROID STIM HORMONE: CPT

## 2025-06-18 PROCEDURE — 2580000003 HC RX 258: Performed by: INTERNAL MEDICINE

## 2025-06-18 RX ORDER — ENOXAPARIN SODIUM 100 MG/ML
40 INJECTION SUBCUTANEOUS DAILY
Status: DISCONTINUED | OUTPATIENT
Start: 2025-06-18 | End: 2025-06-19 | Stop reason: HOSPADM

## 2025-06-18 RX ADMIN — CYANOCOBALAMIN TAB 1000 MCG 250 MCG: 1000 TAB at 09:22

## 2025-06-18 RX ADMIN — SODIUM CHLORIDE, PRESERVATIVE FREE 10 ML: 5 INJECTION INTRAVENOUS at 09:23

## 2025-06-18 RX ADMIN — ENOXAPARIN SODIUM 40 MG: 100 INJECTION SUBCUTANEOUS at 20:36

## 2025-06-18 RX ADMIN — SODIUM CHLORIDE, PRESERVATIVE FREE 10 ML: 5 INJECTION INTRAVENOUS at 20:37

## 2025-06-18 RX ADMIN — CETIRIZINE HYDROCHLORIDE 5 MG: 10 TABLET, FILM COATED ORAL at 09:22

## 2025-06-18 RX ADMIN — SODIUM CHLORIDE: 0.9 INJECTION, SOLUTION INTRAVENOUS at 08:30

## 2025-06-18 RX ADMIN — Medication 400 UNITS: at 13:19

## 2025-06-18 RX ADMIN — PYRIDOXINE HCL TAB 50 MG 100 MG: 50 TAB at 09:21

## 2025-06-18 RX ADMIN — OXYCODONE HYDROCHLORIDE AND ACETAMINOPHEN 1000 MG: 500 TABLET ORAL at 09:22

## 2025-06-18 RX ADMIN — ASPIRIN 81 MG: 81 TABLET, DELAYED RELEASE ORAL at 09:23

## 2025-06-18 RX ADMIN — ACETAMINOPHEN 650 MG: 325 TABLET ORAL at 00:43

## 2025-06-18 ASSESSMENT — PAIN DESCRIPTION - ORIENTATION: ORIENTATION: LEFT;RIGHT

## 2025-06-18 ASSESSMENT — PAIN SCALES - GENERAL: PAINLEVEL_OUTOF10: 3

## 2025-06-18 ASSESSMENT — PAIN DESCRIPTION - LOCATION: LOCATION: LEG

## 2025-06-18 NOTE — PROGRESS NOTES
Occupational Therapy  OCCUPATIONAL THERAPY INITIAL EVALUATION    [facility]                                                    [address] [facility]      Date:2025                                                Patient Name: Marie Meehan  MRN: [id number]  : 1950  Room: [address] [place] [address]    Evaluating OT:Tran Healy OTR/L #[id number]    Referring Provider: Michel Salinas MD  Specific Provider Orders/Date: OT eval and treat 25  Placement Recommendation: home with home health OT     Diagnosis: YU (acute kidney injury) [N17.9]  Acute kidney injury [N17.9]   Pt admitted to hospital on 25 for fatigue  Signed out AMA on 6/15    Pertinent Medical History:  has a past medical history of Breast cancer (HCC), Cancer (HCC), Colon cancer screening, GERD (gastroesophageal reflux disease), [place] disease (HCC), and Thyroid disease.       Precautions:  Fall Risk, ataxic, up w/ assist, decreased insight to deficits, +alarms    Assessment of current deficits    [x] Functional mobility  [x]ADLs  [x] Strength               [x]Cognition    [x] Functional transfers   [x] IADLs         [x] Safety Awareness   [x]Endurance    [] Fine Coordination              [x] Balance      [] Vision/perception   []Sensation     []Gross Motor Coordination  [] ROM  [] Delirium                   [] Motor Control     OT PLAN OF CARE   OT POC based on physician orders, patient diagnosis and results of clinical assessment    Frequency/Duration 1-3 days/wk for 2 weeks PRN   Specific OT Treatment Interventions to include:   * Instruction/training on adapted ADL techniques and AE recommendations to increase functional independence within precautions       * Training on energy conservation strategies, correct breathing pattern and techniques to improve independence/tolerance for self-care routine  * Functional  transfer/mobility training/DME recommendations for increased independence, safety, and fall prevention  * Patient/Family education to increase follow through with safety techniques and functional independence  * Recommendation of environmental modifications for increased safety with functional transfers/mobility and ADLs  * Cognitive retraining/development of therapeutic activities to improve problem solving, judgement, memory, and attention for increased safety/participation in ADL/IADL tasks  * Therapeutic exercise to improve motor endurance, ROM, and functional strength for ADLs/functional transfers  * Therapeutic activities to facilitate/challenge dynamic balance, stand tolerance for increased safety and independence with ADLs  * Therapeutic activities to facilitate gross/fine motor skills for increased independence with ADLs    Recommended Adaptive Equipment: TBD     Home Living: Pt lives alone in a 2 floor home. 4 JANY, 2 handrails  Often resides on 1st floor (w/ full bathroom and bedroom)   Bathroom setup: walk in shower with grab bars    Equipment owned: grab [employer],     Prior Level of Function: independent with ADLs , independent with IADLs; ambulated w/o AD   Driving: yes  Per pt, no falls in past 3 months.    Pain Level: Pt c/o no pain this session    Cognition: A&O: 4/4; Follows 1 step directions  Decreased insight to deficits   Memory:  good    Sequencing:  good    Problem solving:  fair    Judgement/safety:  fair  (+impulsivity-education/cues for safety/fall prevention)     AM-PAC Daily Activity Raw Score: 18/24  How much help is needed for putting on and taking off regular lower body clothing? A Little   How much help is needed for bathing (which includes washing, rinsing, drying)? A Little   How much help is needed for toileting (which includes using toilet, bedpan, or urinal)? A Little   How much help is needed for putting on and taking off regular upper body clothing? A Little   How much help is

## 2025-06-18 NOTE — CARE COORDINATION
HARDIK met with patient in room.  She states she lives alone, in a 2 story home.  She states she usually stays on the first floor and does have a bed and bath on first floor.  She was independent prior to admit, uses no DME, drives.  PCP is Dr Trevino.  No history of HHC or [facility].  Plan home at [place], she states no needs currently.

## 2025-06-18 NOTE — PROGRESS NOTES
Spiritual Health History and Assessment/Progress Note  [facility] [place]    Spiritual/Emotional Needs,  ,  ,      Name: Marie Meehan MRN: [id number]    Age: 75 y.o.     Sex: female   Language: English   Jehovah's witness: Evangelical   Acute kidney injury     Date: 6/18/2025                           Spiritual Assessment began in [facility] MED SURG TELE        Referral/Consult From: Rounding   Encounter Overview/Reason: Spiritual/Emotional Needs  Service Provided For: Patient    Mari, Belief, Meaning:   Patient has beliefs or practices that help with coping during difficult times  Family/Friends No family/friends present      Importance and Influence:  Patient has spiritual/personal beliefs that influence decisions regarding their health  Family/Friends No family/friends present    Community:  Patient feels well-supported. Support system includes: Children  Family/Friends No family/friends present    Assessment and Plan of Care:     Patient Interventions include: Facilitated expression of thoughts and feelings, Affirmed coping skills/support systems, and Facilitated life review and/ or legacy  Family/Friends Interventions include: No family/friends present    Patient Plan of Care: Spiritual Care available upon further referral  Family/Friends Plan of Care: No family/friends present    Electronically signed by Chaplain Norm on 6/18/2025 at 1:41 PM

## 2025-06-18 NOTE — PROGRESS NOTES
hysical Therapy   Initial Evaluation/Plan of Care    Room #:  0430/0430-02  Patient Name: Marie Meehan  YOB: 1950  MRN: [id number]    Date of Service: 6/18/2025     Tentative placement recommendation: Home with Home vs outpt PT  Equipment recommendation: To be determined      Evaluating Physical Therapist: Debbie Smyth, PT #[id number]      Specific Provider Orders/Date/Referring Provider :    PT evaluation and treat  Start:  06/17/25 1930,   End:  06/17/25 1930,   ONE TIME,   Standing Count:  1 Occurrences,   R      Admitting Diagnosis:   YU (acute kidney injury) [N17.9]  Acute kidney injury [N17.9]    Pt is a 75 y.o. female adm 6/17 with c/o fatigue.Recently seen here for same complaints but left AMA. + YU  Surgery: none    Visit Diagnoses         Codes      YU (acute kidney injury)    -  Primary N17.9            Patient Active Problem List   Diagnosis    Malignant neoplasm of upper-inner quadrant of left breast in female, estrogen receptor positive (HCC)    Hearing problem of both ears    Vitamin D deficiency    Postoperative hypothyroidism    Cardiac murmur    Carotid stenosis    GERD (gastroesophageal reflux disease)    HTN (hypertension)    [place]osi's disease (HCC)    Acquired hypothyroidism    SVT (supraventricular tachycardia)    Acute renal failure (ARF)    Acute kidney injury    Hypercalcemia    Fatigue    Elevated troponin level not due to acute coronary syndrome    Hyperthyroidism-- exogenous        ASSESSMENT of Current Deficits Patient exhibits decreased strength, balance, endurance, and coordination impairing transfers, gait , gait distance, and tolerance to activity   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       PHYSICAL THERAPY  PLAN OF CARE       Physical therapy plan of care is established based on physician order,  patient diagnosis and clinical assessment    Current Treatment Recommendations:    -Standing Balance: Perform strengthening exercises in standing to promote motor control

## 2025-06-18 NOTE — PROGRESS NOTES
This patient is on DVT Prophylaxis medication that requires a dose adjustment      Date Body Weight IBW  Adjusted BW SCr  CrCl Dialysis status   6/18/2025 64.9 kg (143 lb) Ideal body weight: 59.3 kg (130 lb 11.7 oz)  Adjusted ideal body weight: 61.5 kg (135 lb 10.2 oz) Serum creatinine: 1.4 mg/dL (H) 06/18/25 0533  Estimated creatinine clearance: 32 mL/min (A) N/a       Pharmacy has dose-adjusted the DVT Prophylaxis regimen to match   the recommendations from the following table        Ordered Medication:Lovenox 30mg daily    Order Changed/converted to: Lovenox 40mg daily    These changes were made per protocol according to the [facility] Pharmacist   Review for Appropriate Use and Automatic Dose Adjustments of   Subcutaneous Anticoagulants Policy     *Please note this dose may need readjusted if patient's condition changes.    Please contact pharmacy with any questions regarding these changes.    Thank you,       Neida Mcdaniels RPH  6/18/2025   7:41 AM    [facility]: [phone]

## 2025-06-18 NOTE — PROGRESS NOTES
Hospitalist Progress Note      Synopsis: Patient admitted on 6/17/2025 75 y.o. female patient of Belinda Martina SUN    has a past medical history of Breast cancer (HCC), Cancer (HCC), Colon cancer screening, GERD (gastroesophageal reflux disease), Lake disease (HCC), and Thyroid disease.   who presents with fatigue.  Patient was evaluated in the ED on Sunday for the same complaint.  At that time her creatinine was elevated and she was recommended for admission.  Patient left AGAINST MEDICAL ADVICE.  Returns with same complaint of fatigue.  She denies any chest pain or trouble breathing.  No vomiting or diarrhea.  No fevers or chills.  No cough or swelling.  No exacerbation relief.  States she is eating and drinking okay.  Denies NSAID use     Emergency department vital signs 98.1, 16, 89, 1/24/1980, 96% room air.  Laboratory evaluation significant for sodium 135, BUN 46, creatinine 1.8, calcium 11.9, at bedtime troponin 24, hemoglobin 10.8.UA is unremarkable.  CT head from 6/15 is negative.  Chest x-ray from 6/15 shows no acute disease.  In the emergency department today patient's treated with 1 L of normal saline.  She will be admitted for further evaluation and treatment.          ASSESSMENT:    Principal Problem:    Acute kidney injury  Active Problems:    Lake's disease (HCC)    Acquired hypothyroidism    Hypercalcemia    Fatigue    Elevated troponin level not due to acute coronary syndrome    Hyperthyroidism-- exogenous  Resolved Problems:    * No resolved hospital problems. *       PLAN:    YU, hypercalcemia  Admit   IVF  Hold nephrotoxins  Renal US  Urine labs  Monitor labs  Check PTH, vitamin D level  Hold losartan, hydrochlorothiazide, vitamin D, calcium supplements     Minimally elevated high-sensitivity troponin in the context of YU without symptoms consistent with ACS.  Patient denies any chest pain or trouble breathing no chest pressure.  EKG is sinus rhythm without acute ischemic

## 2025-06-18 NOTE — PLAN OF CARE
Problem: Safety - Adult  Goal: Free from fall injury  Outcome: Progressing     Problem: Risk for Elopement  Goal: Patient will not exit the unit/facility without proper excort  Outcome: Progressing  Flowsheets  Taken 6/18/2025 0230 by Mercy Dias, RN  Nursing Interventions for Elopement Risk: Assist with personal care needs such as toileting, eating, dressing, as needed to reduce the risk of wandering  Taken 6/17/2025 2104 by Rhett Montgomery, RN  Nursing Interventions for Elopement Risk: Assist with personal care needs such as toileting, eating, dressing, as needed to reduce the risk of wandering  Taken 6/17/2025 2000 by Rhett Montgomery, RN  Nursing Interventions for Elopement Risk: Make sure patient has all necessary personal care items

## 2025-06-19 VITALS
SYSTOLIC BLOOD PRESSURE: 130 MMHG | RESPIRATION RATE: 16 BRPM | BODY MASS INDEX: 22.98 KG/M2 | DIASTOLIC BLOOD PRESSURE: 85 MMHG | HEIGHT: 66 IN | OXYGEN SATURATION: 99 % | TEMPERATURE: 98.2 F | WEIGHT: 143 LBS | HEART RATE: 75 BPM

## 2025-06-19 LAB
ALBUMIN: 3.8 G/DL (ref 3.5–5.2)
ANION GAP SERPL CALCULATED.3IONS-SCNC: 11 MMOL/L (ref 7–16)
BUN SERPL-MCNC: 32 MG/DL (ref 8–23)
CALCIUM SERPL-MCNC: 10.1 MG/DL (ref 8.8–10.2)
CHLORIDE SERPL-SCNC: 104 MMOL/L (ref 98–107)
CO2 SERPL-SCNC: 24 MMOL/L (ref 22–29)
CREAT SERPL-MCNC: 1.2 MG/DL (ref 0.5–1)
GFR, ESTIMATED: 45 ML/MIN/1.73M2
GLUCOSE SERPL-MCNC: 92 MG/DL (ref 74–99)
PHOSPHATE SERPL-MCNC: 3.1 MG/DL (ref 2.5–4.5)
POTASSIUM SERPL-SCNC: 4.1 MMOL/L (ref 3.5–5.1)
SODIUM SERPL-SCNC: 139 MMOL/L (ref 136–145)

## 2025-06-19 PROCEDURE — 36415 COLL VENOUS BLD VENIPUNCTURE: CPT

## 2025-06-19 PROCEDURE — 6370000000 HC RX 637 (ALT 250 FOR IP): Performed by: INTERNAL MEDICINE

## 2025-06-19 PROCEDURE — 80069 RENAL FUNCTION PANEL: CPT

## 2025-06-19 PROCEDURE — 99239 HOSP IP/OBS DSCHRG MGMT >30: CPT | Performed by: INTERNAL MEDICINE

## 2025-06-19 PROCEDURE — 2500000003 HC RX 250 WO HCPCS: Performed by: INTERNAL MEDICINE

## 2025-06-19 RX ORDER — CHOLECALCIFEROL (VITAMIN D3) 50 MCG
2000 TABLET ORAL DAILY
Qty: 30 TABLET | Refills: 2 | Status: SHIPPED | OUTPATIENT
Start: 2025-06-19

## 2025-06-19 RX ADMIN — SODIUM CHLORIDE, PRESERVATIVE FREE 10 ML: 5 INJECTION INTRAVENOUS at 09:05

## 2025-06-19 RX ADMIN — PYRIDOXINE HCL TAB 50 MG 100 MG: 50 TAB at 09:04

## 2025-06-19 RX ADMIN — ASPIRIN 81 MG: 81 TABLET, DELAYED RELEASE ORAL at 09:05

## 2025-06-19 RX ADMIN — OXYCODONE HYDROCHLORIDE AND ACETAMINOPHEN 1000 MG: 500 TABLET ORAL at 09:04

## 2025-06-19 RX ADMIN — Medication 400 UNITS: at 11:26

## 2025-06-19 RX ADMIN — CETIRIZINE HYDROCHLORIDE 5 MG: 10 TABLET, FILM COATED ORAL at 09:04

## 2025-06-19 RX ADMIN — CYANOCOBALAMIN TAB 1000 MCG 250 MCG: 1000 TAB at 09:04

## 2025-06-19 NOTE — PROGRESS NOTES
Hospitalist Progress Note      Synopsis: Patient admitted on 6/17/2025 75 y.o. female patient of [place] Martina SUN    has a past medical history of Breast cancer (HCC), Cancer (HCC), Colon cancer screening, GERD (gastroesophageal reflux disease), Logan disease (HCC), and Thyroid disease.   who presents with fatigue.  Patient was evaluated in the ED on Sunday for the same complaint.  At that time her creatinine was elevated and she was recommended for admission.  Patient left AGAINST MEDICAL ADVICE.  Returns with same complaint of fatigue.  She denies any chest pain or trouble breathing.  No vomiting or diarrhea.  No fevers or chills.  No cough or swelling.  No exacerbation relief.  States she is eating and drinking okay.  Denies NSAID use     Emergency department vital signs 98.1, 16, 89, 1/24/1980, 96% room air.  Laboratory evaluation significant for sodium 135, BUN 46, creatinine 1.8, calcium 11.9, at bedtime troponin 24, hemoglobin 10.8.UA is unremarkable.  CT head from 6/15 is negative.  Chest x-ray from 6/15 shows no acute disease.  In the emergency department today patient's treated with 1 L of normal saline.  She will be admitted for further evaluation and treatment.          ASSESSMENT:    Principal Problem:    Acute kidney injury  Active Problems:    Logan's disease (HCC)    Acquired hypothyroidism    Hypercalcemia    Fatigue    Elevated troponin level not due to acute coronary syndrome    Hyperthyroidism-- exogenous  Resolved Problems:    * No resolved hospital problems. *       PLAN:    YU, hypercalcemia  Admit   IVF  Hold nephrotoxins  Renal US  Urine labs  Monitor labs  low PTH,   Boderline high vitamin D level  Hold losartan with DC until follow up, DC hydrochlorothiazide , recrease vitamin D, calcium supplements     Minimally elevated high-sensitivity troponin in the context of YU without symptoms consistent with ACS.  Patient denies any chest pain or trouble breathing no chest  pressure.  EKG is sinus rhythm without acute ischemic changes     Yosi's disease  Symptomatic treatment    Hypothyroidism Synthroid with exogenous hyper thyroidism currently  Patient is instructed to decrease dose of Synthroid  She is reluctant states that she could not move last time her Synthroid dose was decreased       Diet: ADULT DIET; Regular  Code Status: Full Code  PT/OT Eval Status:     DVT Prophylaxis:     Recommended disposition at discharge:  DC home    Subjective    Feeling better  No CP or SOB  No fever or chills   No uncontrolled pain  No vomiting or diarrhea   No events reported overnight     Exam:  /85   Pulse 75   Temp 98.2 °F (36.8 °C) (Oral)   Resp 16   Ht 1.676 m (5' 6\")   Wt 64.9 kg (143 lb)   SpO2 99%   BMI 23.08 kg/m²   General appearance: No apparent distress, appears stated age and cooperative.  HEENT: Pupils equal, round, and reactive to light. Conjunctivae/corneas clear.  Neck: Supple. No jugular venous distention. Trachea midline.  Respiratory:  Normal respiratory effort. Clear to auscultation, bilaterally without Rales/Wheezes/Rhonchi.  Cardiovascular: Regular rate and rhythm with normal S1/S2 without murmurs, rubs or gallops.  Abdomen: Soft, non-tender, non-distended with normal bowel sounds.  Musculoskeletal: No clubbing, cyanosis or edema bilaterally. Brisk capillary refill. 2+radial pulses.   Skin:  No rashes    Neurologic: awake, alert and following commands    Medications:  Reviewed    Infusion Medications    sodium chloride       Scheduled Medications    enoxaparin  40 mg SubCUTAneous Daily    vitamin C  1,000 mg Oral Daily    aspirin  81 mg Oral Daily    [Held by provider] levothyroxine  125 mcg Oral Daily    cetirizine  5 mg Oral Daily    [Held by provider] losartan  25 mg Oral Daily    vitamin B-6  100 mg Oral Daily    vitamin B-12  250 mcg Oral Daily    [Held by provider] vitamin D3  5,000 Units Oral Daily    vitamin E  400 Units Oral Daily    sodium

## 2025-06-19 NOTE — DISCHARGE SUMMARY
Physician Discharge Summary     Patient ID:  Marie Meehan  [id number]  75 y.o.  1950    Admit date: 6/17/2025    Discharge date and time:  6/19/2025    Discharge Diagnoses: Principal Problem:    Acute kidney injury  Active Problems:    Mcpherson's disease (HCC)    Acquired hypothyroidism    Hypercalcemia    Fatigue    Elevated troponin level not due to acute coronary syndrome    Hyperthyroidism-- exogenous  Resolved Problems:    * No resolved hospital problems. *      Consults: IP CONSULT TO SOCIAL WORK    Procedures: See below    Hospital Course:  Patient admitted on 6/17/2025 75 y.o. female patient of Martina Trevino DO   has a past medical history of Breast cancer (HCC), Cancer (HCC), Colon cancer screening, GERD (gastroesophageal reflux disease), Mcpherson disease (HCC), and Thyroid disease.   who presents with fatigue.  Patient was evaluated in the ED on Sunday for the same complaint.  At that time her creatinine was elevated and she was recommended for admission.  Patient left AGAINST MEDICAL ADVICE.  Returns with same complaint of fatigue.  She denies any chest pain or trouble breathing.  No vomiting or diarrhea.  No fevers or chills.  No cough or swelling.  No exacerbation relief.  States she is eating and drinking okay.  Denies NSAID use     Emergency department vital signs 98.1, 16, 89, 1/24/1980, 96% room air.  Laboratory evaluation significant for sodium 135, BUN 46, creatinine 1.8, calcium 11.9, at bedtime troponin 24, hemoglobin 10.8.UA is unremarkable.  CT head from 6/15 is negative.  Chest x-ray from 6/15 shows no acute disease.  In the emergency department today patient's treated with 1 L of normal saline.  She will be admitted for further evaluation and treatment.     YU, hypercalcemia  Admit   IVF  Hold nephrotoxins  Renal US  Urine labs  Monitor labs  low PTH,   Boderline high vitamin D level  Hold losartan with DC until follow up, DC hydrochlorothiazide , recrease vitamin D,

## 2025-06-19 NOTE — DISCHARGE INSTRUCTIONS
Your information:  Name: Marie Meehan  : 1950    Your instructions:    YOU ARE BEING DISCHARGED HOME. PLEASE MAKE AND KEEP YOUR FOLLOW UP APPOINTMENTS.    What to do after you leave the hospital:    Recommended diet: regular diet    Recommended activity: activity as tolerated    IF YOU EXPERIENCE ANY OF THE FOLLOWING SYMPTOMS, CHEST PAIN, SHORTNESS OF BREATH, COUGHING UP BLOOD OR BLOODY SPUTUM, STOMACH PAIN OR CRAMPING, DARK, TARRY STOOLS, LOSS OF APPETITE, GENERAL NOT FEELING WELL, SIGNS AND SYMPTOMS OF INFECTION LIKE FEVER AND OR CHILLS, PLEASE CALL Martina Browning DO  OR RETURN TO THE EMERGENCY ROOM.    The following personal items were collected during your admission and were returned to you:    Belongings  Dental Appliances: Uppers, Lowers  Vision - Corrective Lenses: Eyeglasses  Hearing Aid: None  Clothing: Pants, Shirt  Jewelry: Watch  Body Piercings Removed: N/A  Electronic Devices: Cell Phone,   Weapons (Notify Protective Services/Security): None  Other Valuables: Purse, Keys, Money  Home Medications: None  Valuables Given To: Patient  Provide Name(s) of Who Valuable(s) Were Given To: .  Responsible person(s) in the waiting room: .    Information obtained by:  By signing below, I understand that if any problems occur once I leave the hospital I am to contact  Martina Trevino DO or go to emergency room.  I understand and acknowledge receipt of the instructions indicated above.

## 2025-06-20 ENCOUNTER — TELEPHONE (OUTPATIENT)
Dept: PRIMARY CARE CLINIC | Age: 75
End: 2025-06-20

## 2025-06-20 ENCOUNTER — CARE COORDINATION (OUTPATIENT)
Dept: CARE COORDINATION | Age: 75
End: 2025-06-20

## 2025-06-20 DIAGNOSIS — R53.83 OTHER FATIGUE: Primary | ICD-10-CM

## 2025-06-20 PROCEDURE — 1111F DSCHRG MED/CURRENT MED MERGE: CPT | Performed by: FAMILY MEDICINE

## 2025-06-20 NOTE — TELEPHONE ENCOUNTER
Care Transitions Initial Follow Up Call    Outreach made within 2 business days of discharge: Yes    Patient: Marie Meehan Patient : 1950   MRN: [id number]  Reason for Admission: [id number]   Discharge Date: 25       Spoke with: Patient    Discharge department/facility: [facility] Interactive Patient Contact:  Was patient able to fill all prescriptions: Yes  Was patient instructed to bring all medications to the follow-up visit: Yes  Is patient taking all medications as directed in the discharge summary? Yes  Does patient understand their discharge instructions: Yes  Does patient have questions or concerns that need addressed prior to 7-14 day follow up office visit: no    Additional needs identified to be addressed with provider  No needs identified             Scheduled appointment with PCP within 7-14 days    Follow Up  Future Appointments   Date Time Provider Department Center   2025 10:00 AM Martina Trevino DO [facility] ECC DEP       Faith Bloom MA

## 2025-06-20 NOTE — CARE COORDINATION
Care Transitions Note    Initial Call - Call within 2 business days of discharge: Yes    Patient Current Location:  Home: [address] [place] [id number]    Care Transition Nurse contacted the patient by telephone to perform post hospital discharge assessment, verified name and  as identifiers.  Provided introduction to self, and explanation of the Care Transition Nurse role.    Patient: Marie Meehan    Patient : 1950   MRN: [id number]    Reason for Admission: YU  Discharge Date: 25  RURS: Readmission Risk Score: 13.9      Last Discharge Facility       Date Complaint Diagnosis Description Type Department Provider    25 Fatigue YU (acute kidney injury) ED to Hosp-Admission (Discharged) (ADMITTED) [facility] 4 TELE Michel Salinas MD; Abraham...            Was this an external facility discharge? No    Additional needs identified to be addressed with provider   Standard priority: Needs HFU appt             Method of communication with provider: chart routing.    Patients top risk factors for readmission: medical condition-Hx breast CA, Wickliffe's disease, thyroid disease, GERD    Interventions to address risk factors:   Review of patient management of conditions/medications: Reviewed symptoms, medications, appt needs    Care Summary Note: Patient admitted for fatigue. Pt was recently IP for same reason but left AMA. Found to be hydrated, diagnosed with YU. Stated she missed PCP appt d/t admission, will call today to get rescheduled. Reviewed medications. Losartan is on pause, hydrochlorothiazide d/c'd. Stated she will call her Cardiologist to confirm changes (non [facility]).     Patient ate breakfast this morning, is aware to stay hydrated, drink @ 64 ounces fluids daily. Agreeable to transitions calls.    Care Transition Nurse reviewed discharge instructions with patient. The patient was given an opportunity to ask questions; all questions answered at this time.. The patient

## 2025-06-25 ENCOUNTER — TELEPHONE (OUTPATIENT)
Dept: PRIMARY CARE CLINIC | Age: 75
End: 2025-06-25

## 2025-06-25 NOTE — TELEPHONE ENCOUNTER
Pt called was in-pt  6/17-6/19 for AKD and wants to know if that causes very bad taste of food, she eats but doesn't like the taste of anything and also does it cause her to be very tired, she is exhausted. Please advise.

## 2025-06-26 ENCOUNTER — CARE COORDINATION (OUTPATIENT)
Dept: CARE COORDINATION | Age: 75
End: 2025-06-26

## 2025-06-26 NOTE — CARE COORDINATION
Care Transitions Note    Follow Up Call     Attempted to reach patient for transitions of care follow up.  Unable to reach patient.      Outreach Attempts:   Unable to leave message. VMB full    Care Summary Note: Attempted to contact patient for transitions follow up call. VMB full.     Follow Up Appointment:   Future Appointments         Provider Specialty Dept Phone    6/30/2025 3:15 PM Martina Trevino,  Primary Care [phone]    12/19/2025 10:00 AM Martina Trevino DO Primary Care [phone]            Plan for follow-up call in 6-10 days based on severity of symptoms and risk factors. Plan for next call: symptom management-2nd attempt sub. Did she call Cardiology about discharge med changes? Bad taste in mouth-she called PCP. YU-hydration?PCP appt?    Jaki Urrutia RN

## 2025-06-26 NOTE — CARE COORDINATION
Care Transitions Note    Follow Up Call     Patient Current Location:  Home: [address] [place] [id number]    Care Transition Nurse contacted the patient by telephone. Verified name and  as identifiers.    Additional needs identified to be addressed with provider   No needs identified                 Method of communication with provider: none.    Care Summary Note: Patient called back, stated she continues to have a \"bad taste\" in her mouth, may be r/t nasal drainage or acid reflux. Advised to eat small bland meals, push fluids. Pt has Pedialyte, drinking a lot of water, reports 64 ounces per day. Remains fatigued, has appt with PCP on  will discuss symptoms then. UOP wnl, bowels are moving. Will follow up after PCP appt next week.     Plan of care updates since last contact:  Education: push fluids, small bland meals, sports drinks  Review of patient management of conditions/medications: reviewed symptoms, appts       Advance Care Planning:   Does patient have an Advance Directive: reviewed and current.    Medication Review:  No changes since last call.     Remote Patient Monitoring:  Offered patient enrollment in the Remote Patient Monitoring (RPM) program for in-home monitoring: Patient is not eligible for RPM program because: patient does not have qualifying diagnosis.    Assessments:  Care Transitions Subsequent and Final Call    Subsequent and Final Calls  Do you have any ongoing symptoms?: Yes  Onset of Patient-reported symptoms: In the past 7 days  Patient-reported symptoms: Other  Interventions for patient-reported symptoms: Other  Have your medications changed?: No  Do you have any questions related to your medications?: No  Do you currently have any active services?: No  Do you have any needs or concerns that I can assist you with?: No  Identified Barriers: Other, Lack of Education  Care Transitions Interventions  Other Interventions:              Follow Up Appointment:   Reviewed

## 2025-06-27 DIAGNOSIS — E03.9 HYPOTHYROIDISM, UNSPECIFIED TYPE: ICD-10-CM

## 2025-06-27 RX ORDER — LEVOTHYROXINE SODIUM 125 UG/1
125 TABLET ORAL DAILY
Qty: 90 TABLET | Refills: 1 | Status: SHIPPED | OUTPATIENT
Start: 2025-06-27

## 2025-06-30 ENCOUNTER — OFFICE VISIT (OUTPATIENT)
Dept: PRIMARY CARE CLINIC | Age: 75
End: 2025-06-30

## 2025-06-30 VITALS
DIASTOLIC BLOOD PRESSURE: 68 MMHG | WEIGHT: 138 LBS | BODY MASS INDEX: 22.18 KG/M2 | OXYGEN SATURATION: 92 % | HEIGHT: 66 IN | SYSTOLIC BLOOD PRESSURE: 157 MMHG | TEMPERATURE: 98.2 F | HEART RATE: 87 BPM

## 2025-06-30 DIAGNOSIS — N17.9 AKI (ACUTE KIDNEY INJURY): Primary | ICD-10-CM

## 2025-06-30 DIAGNOSIS — Z09 HOSPITAL DISCHARGE FOLLOW-UP: ICD-10-CM

## 2025-06-30 DIAGNOSIS — D50.9 IRON DEFICIENCY ANEMIA, UNSPECIFIED IRON DEFICIENCY ANEMIA TYPE: ICD-10-CM

## 2025-06-30 PROBLEM — G10 HUNTINGTON CHOREA (HCC): Status: ACTIVE | Noted: 2025-06-30

## 2025-06-30 LAB
ALBUMIN: 4 G/DL (ref 3.5–5.2)
ALP BLD-CCNC: 157 U/L (ref 35–104)
ALT SERPL-CCNC: 73 U/L (ref 0–35)
ANION GAP SERPL CALCULATED.3IONS-SCNC: 16 MMOL/L (ref 7–16)
AST SERPL-CCNC: 111 U/L (ref 0–35)
BASOPHILS ABSOLUTE: 0.06 K/UL (ref 0–0.2)
BASOPHILS RELATIVE PERCENT: 1 % (ref 0–2)
BILIRUB SERPL-MCNC: 0.8 MG/DL (ref 0–1.2)
BLASTS ABSOLUTE COUNT: 0.06 K/UL
BLASTS: 1 %
BUN BLDV-MCNC: 30 MG/DL (ref 8–23)
CALCIUM SERPL-MCNC: 10.6 MG/DL (ref 8.8–10.2)
CHLORIDE BLD-SCNC: 100 MMOL/L (ref 98–107)
CO2: 23 MMOL/L (ref 22–29)
CREAT SERPL-MCNC: 1.3 MG/DL (ref 0.5–1)
EOSINOPHILS ABSOLUTE: 0.06 K/UL (ref 0.05–0.5)
EOSINOPHILS RELATIVE PERCENT: 1 % (ref 0–6)
GFR, ESTIMATED: 44 ML/MIN/1.73M2
GLUCOSE BLD-MCNC: 110 MG/DL (ref 74–99)
HCT VFR BLD CALC: 23.9 % (ref 34–48)
HEMOGLOBIN: 7.8 G/DL (ref 11.5–15.5)
LYMPHOCYTES ABSOLUTE: 1.81 K/UL (ref 1.5–4)
LYMPHOCYTES RELATIVE PERCENT: 25 % (ref 20–42)
MCH RBC QN AUTO: 29.9 PG (ref 26–35)
MCHC RBC AUTO-ENTMCNC: 32.6 G/DL (ref 32–34.5)
MCV RBC AUTO: 91.6 FL (ref 80–99.9)
METAMYELOCYTES ABSOLUTE COUNT: 0.19 K/UL (ref 0–0.12)
METAMYELOCYTES: 3 % (ref 0–1)
MONOCYTES ABSOLUTE: 0.06 K/UL (ref 0.1–0.95)
MONOCYTES RELATIVE PERCENT: 1 % (ref 2–12)
MYELOCYTES ABSOLUTE COUNT: 0.06 K/UL
MYELOCYTES: 1 %
NEUTROPHILS ABSOLUTE: 4.8 K/UL (ref 1.8–7.3)
NEUTROPHILS RELATIVE PERCENT: 68 % (ref 43–80)
NUCLEATED RED BLOOD CELLS: 3 PER 100 WBC
PDW BLD-RTO: 18.8 % (ref 11.5–15)
PLATELET # BLD: 144 K/UL (ref 130–450)
PMV BLD AUTO: 11.9 FL (ref 7–12)
POTASSIUM SERPL-SCNC: 4.3 MMOL/L (ref 3.5–5.1)
RBC # BLD: 2.61 M/UL (ref 3.5–5.5)
RBC # BLD: ABNORMAL 10*6/UL
SODIUM BLD-SCNC: 138 MMOL/L (ref 136–145)
TOTAL PROTEIN: 6.8 G/DL (ref 6.4–8.3)
WBC # BLD: 7.1 K/UL (ref 4.5–11.5)

## 2025-06-30 ASSESSMENT — PATIENT HEALTH QUESTIONNAIRE - PHQ9
1. LITTLE INTEREST OR PLEASURE IN DOING THINGS: NOT AT ALL
SUM OF ALL RESPONSES TO PHQ QUESTIONS 1-9: 0
2. FEELING DOWN, DEPRESSED OR HOPELESS: NOT AT ALL

## 2025-06-30 NOTE — PROGRESS NOTES
tablet  Commonly known as: CYANOCOBALAMIN     vitamin B-6 100 MG tablet  Commonly known as: PYRIDOXINE     vitamin C 500 MG tablet  Commonly known as: ASCORBIC ACID     vitamin D 50 MCG (2000 UT) Tabs tablet  Commonly known as: CHOLECALCIFEROL  Take 1 tablet by mouth daily     vitamin E 400 UNIT capsule               Medications marked \"taking\" at this time  Outpatient Medications Marked as Taking for the 6/30/25 encounter (Office Visit) with Martina Trevino DO   Medication Sig Dispense Refill    levothyroxine (SYNTHROID) 125 MCG tablet TAKE 1 TABLET BY MOUTH DAILY 90 tablet 1    vitamin D3 (CHOLECALCIFEROL) 50 MCG (2000 UT) TABS tablet Take 1 tablet by mouth daily 30 tablet 2    aspirin 81 MG EC tablet Take 1 tablet by mouth daily      acetaminophen (TYLENOL) 500 MG tablet Take 1 tablet by mouth every 6 hours as needed for Pain      vitamin E 400 UNIT capsule Take 1 capsule by mouth daily      loratadine (CLARITIN) 10 MG capsule Take 1 capsule by mouth daily      Coenzyme Q10 (COQ10) 100 MG CAPS Take 1 capsule by mouth daily      Ascorbic Acid (VITAMIN C) 500 MG tablet Take 2 tablets by mouth daily      Pyridoxine HCl (VITAMIN B-6) 100 MG tablet Take 1 tablet by mouth daily      vitamin B-12 (CYANOCOBALAMIN) 250 MCG tablet Take 1 tablet by mouth daily      CALCIUM CARBONATE-VITAMIN D PO Take 1 tablet by mouth 2 times daily          Medications patient taking as of now reconciled against medications ordered at time of hospital discharge: Yes    Review of Systems as above    Objective:         Vitals:    06/30/25 1504   BP: (!) 157/68   Pulse: 87   Temp: 98.2 °F (36.8 °C)   TempSrc: Temporal   SpO2: 92%   Weight: 62.6 kg (138 lb)   Height: 1.676 m (5' 6\")      Body mass index is 22.27 kg/m².         Physical Exam  Vitals and nursing note reviewed.   Constitutional:       General: She is not in acute distress.     Appearance: Normal appearance.   HENT:      Head: Normocephalic and atraumatic.   Eyes:      General:

## 2025-07-01 ENCOUNTER — TELEPHONE (OUTPATIENT)
Dept: PRIMARY CARE CLINIC | Age: 75
End: 2025-07-01

## 2025-07-02 ENCOUNTER — TELEPHONE (OUTPATIENT)
Dept: PRIMARY CARE CLINIC | Age: 75
End: 2025-07-02

## 2025-07-02 ENCOUNTER — RESULTS FOLLOW-UP (OUTPATIENT)
Dept: PRIMARY CARE CLINIC | Age: 75
End: 2025-07-02

## 2025-07-02 DIAGNOSIS — D50.9 IRON DEFICIENCY ANEMIA, UNSPECIFIED IRON DEFICIENCY ANEMIA TYPE: ICD-10-CM

## 2025-07-02 DIAGNOSIS — R74.8 ELEVATED LIVER ENZYMES: Primary | ICD-10-CM

## 2025-07-03 ENCOUNTER — CARE COORDINATION (OUTPATIENT)
Dept: CARE COORDINATION | Age: 75
End: 2025-07-03

## 2025-07-03 DIAGNOSIS — R74.8 ELEVATED LIVER ENZYMES: ICD-10-CM

## 2025-07-03 DIAGNOSIS — D50.9 IRON DEFICIENCY ANEMIA, UNSPECIFIED IRON DEFICIENCY ANEMIA TYPE: ICD-10-CM

## 2025-07-03 LAB
ALBUMIN: NORMAL
ALP BLD-CCNC: NORMAL U/L
ALT SERPL-CCNC: NORMAL U/L
ANION GAP SERPL CALCULATED.3IONS-SCNC: NORMAL MMOL/L
ANTIBODY: NEGATIVE
AST SERPL-CCNC: NORMAL U/L
BASOPHILS ABSOLUTE: NORMAL
BASOPHILS RELATIVE PERCENT: NORMAL
BILIRUB SERPL-MCNC: NORMAL MG/DL
BUN BLDV-MCNC: NORMAL MG/DL
CALCIUM SERPL-MCNC: NORMAL MG/DL
CHLORIDE BLD-SCNC: NORMAL MMOL/L
CO2: NORMAL
CREAT SERPL-MCNC: NORMAL MG/DL
EOSINOPHILS ABSOLUTE: NORMAL
EOSINOPHILS RELATIVE PERCENT: NORMAL
FERRITIN: 3532 NG/ML (ref 9–150)
GFR, ESTIMATED: 38
GLUCOSE BLD-MCNC: NORMAL MG/DL
HCT VFR BLD CALC: NORMAL %
HEMOGLOBIN: NORMAL
IRON: 229
LYMPHOCYTES ABSOLUTE: NORMAL
LYMPHOCYTES RELATIVE PERCENT: NORMAL
MCH RBC QN AUTO: NORMAL PG
MCHC RBC AUTO-ENTMCNC: NORMAL G/DL
MCV RBC AUTO: NORMAL FL
MONOCYTES ABSOLUTE: NORMAL
MONOCYTES RELATIVE PERCENT: NORMAL
NEUTROPHILS ABSOLUTE: NORMAL
NEUTROPHILS RELATIVE PERCENT: NORMAL
PDW BLD-RTO: NORMAL %
PLATELET # BLD: NORMAL 10*3/UL
PMV BLD AUTO: NORMAL FL
POTASSIUM SERPL-SCNC: NORMAL MMOL/L
RBC # BLD: NORMAL 10*6/UL
SODIUM BLD-SCNC: NORMAL MMOL/L
TOTAL IRON BINDING CAPACITY: 48
TOTAL PROTEIN: NORMAL
WBC # BLD: NORMAL 10*3/UL

## 2025-07-03 NOTE — CARE COORDINATION
Care Transitions Note    Follow Up Call     Attempted to reach patient for transitions of care follow up.  Unable to reach patient.      Outreach Attempts:   HIPAA compliant voicemail left for patient.     Follow Up Appointment:   Future Appointments         Provider Specialty Dept Phone    9/2/2025 1:00 PM Martina Trevino DO Primary Care [phone]    12/19/2025 10:00 AM Martina Trevino DO Primary Care [phone]            Plan for follow-up call in 6-10 days based on severity of symptoms and risk factors. Plan for next call: symptom management-review PCP notes, hydration, bad taste in mouth?        Katiuska Thomas RN

## 2025-07-03 NOTE — TELEPHONE ENCOUNTER
Additional testing being performed.  Labs were ordered earlier today when patient stopped into the office.

## 2025-07-07 ENCOUNTER — RESULTS FOLLOW-UP (OUTPATIENT)
Dept: PRIMARY CARE CLINIC | Age: 75
End: 2025-07-07

## 2025-07-09 ENCOUNTER — APPOINTMENT (OUTPATIENT)
Dept: ULTRASOUND IMAGING | Age: 75
DRG: 644 | End: 2025-07-09
Payer: MEDICARE

## 2025-07-09 ENCOUNTER — APPOINTMENT (OUTPATIENT)
Dept: CT IMAGING | Age: 75
DRG: 644 | End: 2025-07-09
Payer: MEDICARE

## 2025-07-09 ENCOUNTER — TELEPHONE (OUTPATIENT)
Dept: PRIMARY CARE CLINIC | Age: 75
End: 2025-07-09

## 2025-07-09 ENCOUNTER — APPOINTMENT (OUTPATIENT)
Dept: GENERAL RADIOLOGY | Age: 75
DRG: 644 | End: 2025-07-09
Payer: MEDICARE

## 2025-07-09 ENCOUNTER — HOSPITAL ENCOUNTER (INPATIENT)
Age: 75
LOS: 3 days | Discharge: HOME | DRG: 644 | End: 2025-07-12
Attending: EMERGENCY MEDICINE | Admitting: FAMILY MEDICINE
Payer: MEDICARE

## 2025-07-09 DIAGNOSIS — E87.1 HYPONATREMIA: ICD-10-CM

## 2025-07-09 DIAGNOSIS — E87.1 ACUTE HYPONATREMIA: ICD-10-CM

## 2025-07-09 DIAGNOSIS — G10 HUNTINGTON CHOREA (HCC): ICD-10-CM

## 2025-07-09 DIAGNOSIS — D64.9 SYMPTOMATIC ANEMIA: ICD-10-CM

## 2025-07-09 DIAGNOSIS — R53.1 GENERALIZED WEAKNESS: Primary | ICD-10-CM

## 2025-07-09 DIAGNOSIS — E87.6 HYPOKALEMIA: ICD-10-CM

## 2025-07-09 PROBLEM — N17.9 ACUTE KIDNEY INJURY: Status: RESOLVED | Noted: 2025-06-17 | Resolved: 2025-07-09

## 2025-07-09 PROBLEM — R53.83 FATIGUE: Status: RESOLVED | Noted: 2025-06-17 | Resolved: 2025-07-09

## 2025-07-09 PROBLEM — N17.9 ACUTE RENAL FAILURE (ARF): Status: RESOLVED | Noted: 2025-06-15 | Resolved: 2025-07-09

## 2025-07-09 PROBLEM — R79.89 ELEVATED TROPONIN LEVEL NOT DUE TO ACUTE CORONARY SYNDROME: Status: RESOLVED | Noted: 2025-06-17 | Resolved: 2025-07-09

## 2025-07-09 PROBLEM — R01.1 CARDIAC MURMUR: Status: RESOLVED | Noted: 2023-07-24 | Resolved: 2025-07-09

## 2025-07-09 PROBLEM — I65.29 CAROTID STENOSIS: Status: RESOLVED | Noted: 2023-07-24 | Resolved: 2025-07-09

## 2025-07-09 LAB
ALBUMIN SERPL-MCNC: 3.9 G/DL (ref 3.5–5.2)
ALP SERPL-CCNC: 171 U/L (ref 35–104)
ALT SERPL-CCNC: 40 U/L (ref 0–35)
AMORPH SED URNS QL MICRO: PRESENT
ANION GAP SERPL CALCULATED.3IONS-SCNC: 12 MMOL/L (ref 7–16)
ANION GAP SERPL CALCULATED.3IONS-SCNC: 12 MMOL/L (ref 7–16)
ANION GAP SERPL CALCULATED.3IONS-SCNC: 14 MMOL/L (ref 7–16)
ANION GAP SERPL CALCULATED.3IONS-SCNC: 15 MMOL/L (ref 7–16)
AST SERPL-CCNC: 93 U/L (ref 0–35)
BACTERIA URNS QL MICRO: ABNORMAL
BASOPHILS # BLD: 0 K/UL (ref 0–0.2)
BASOPHILS # BLD: 0 K/UL (ref 0–0.2)
BASOPHILS NFR BLD: 0 % (ref 0–2)
BASOPHILS NFR BLD: 0 % (ref 0–2)
BILIRUB SERPL-MCNC: 1.3 MG/DL (ref 0–1.2)
BILIRUB UR QL STRIP: NEGATIVE
BUN SERPL-MCNC: 24 MG/DL (ref 8–23)
BUN SERPL-MCNC: 31 MG/DL (ref 8–23)
CALCIUM SERPL-MCNC: 8.2 MG/DL (ref 8.8–10.2)
CALCIUM SERPL-MCNC: 8.2 MG/DL (ref 8.8–10.2)
CALCIUM SERPL-MCNC: 8.4 MG/DL (ref 8.8–10.2)
CALCIUM SERPL-MCNC: 9.2 MG/DL (ref 8.8–10.2)
CHLORIDE SERPL-SCNC: 83 MMOL/L (ref 98–107)
CHLORIDE SERPL-SCNC: 93 MMOL/L (ref 98–107)
CHLORIDE SERPL-SCNC: 93 MMOL/L (ref 98–107)
CHLORIDE SERPL-SCNC: 95 MMOL/L (ref 98–107)
CLARITY UR: ABNORMAL
CO2 SERPL-SCNC: 19 MMOL/L (ref 22–29)
CO2 SERPL-SCNC: 20 MMOL/L (ref 22–29)
COLOR UR: YELLOW
CREAT SERPL-MCNC: 0.8 MG/DL (ref 0.5–1)
CREAT SERPL-MCNC: 0.9 MG/DL (ref 0.5–1)
CREAT SERPL-MCNC: 1 MG/DL (ref 0.5–1)
CREAT SERPL-MCNC: 1 MG/DL (ref 0.5–1)
EKG ATRIAL RATE: 81 BPM
EKG P AXIS: 71 DEGREES
EKG P-R INTERVAL: 162 MS
EKG Q-T INTERVAL: 400 MS
EKG QRS DURATION: 92 MS
EKG QTC CALCULATION (BAZETT): 464 MS
EKG R AXIS: 5 DEGREES
EKG T AXIS: 37 DEGREES
EKG VENTRICULAR RATE: 81 BPM
EOSINOPHIL # BLD: 0 K/UL (ref 0.05–0.5)
EOSINOPHIL # BLD: 0.31 K/UL (ref 0.05–0.5)
EOSINOPHILS RELATIVE PERCENT: 0 % (ref 0–6)
EOSINOPHILS RELATIVE PERCENT: 3 % (ref 0–6)
EPI CELLS #/AREA URNS HPF: ABNORMAL /HPF
ERYTHROCYTE [DISTWIDTH] IN BLOOD BY AUTOMATED COUNT: 18.6 % (ref 11.5–15)
ERYTHROCYTE [DISTWIDTH] IN BLOOD BY AUTOMATED COUNT: 21 % (ref 11.5–15)
FERRITIN SERPL-MCNC: 4090 NG/ML
GFR, ESTIMATED: 56 ML/MIN/1.73M2
GFR, ESTIMATED: 58 ML/MIN/1.73M2
GFR, ESTIMATED: 70 ML/MIN/1.73M2
GFR, ESTIMATED: 75 ML/MIN/1.73M2
GLUCOSE SERPL-MCNC: 133 MG/DL (ref 74–99)
GLUCOSE SERPL-MCNC: 134 MG/DL (ref 74–99)
GLUCOSE SERPL-MCNC: 135 MG/DL (ref 74–99)
GLUCOSE SERPL-MCNC: 168 MG/DL (ref 74–99)
GLUCOSE UR STRIP-MCNC: NEGATIVE MG/DL
HAV IGM SERPL QL IA: NONREACTIVE
HBV CORE IGM SERPL QL IA: NONREACTIVE
HBV SURFACE AG SERPL QL IA: NONREACTIVE
HCT VFR BLD AUTO: 18.4 % (ref 34–48)
HCT VFR BLD AUTO: 21.5 % (ref 34–48)
HCV AB SERPL QL IA: NONREACTIVE
HGB BLD-MCNC: 6.6 G/DL (ref 11.5–15.5)
HGB BLD-MCNC: 7.3 G/DL (ref 11.5–15.5)
HGB UR QL STRIP.AUTO: ABNORMAL
INR PPP: 1.3
IRON SATN MFR SERPL: 91 % (ref 15–50)
IRON SERPL-MCNC: 212 UG/DL (ref 37–145)
KETONES UR STRIP-MCNC: ABNORMAL MG/DL
LEUKOCYTE ESTERASE UR QL STRIP: ABNORMAL
LYMPHOCYTES NFR BLD: 1.43 K/UL (ref 1.5–4)
LYMPHOCYTES NFR BLD: 2.55 K/UL (ref 1.5–4)
LYMPHOCYTES RELATIVE PERCENT: 14 % (ref 20–42)
LYMPHOCYTES RELATIVE PERCENT: 29 % (ref 20–42)
MAGNESIUM SERPL-MCNC: 1.7 MG/DL (ref 1.6–2.4)
MAGNESIUM SERPL-MCNC: 1.8 MG/DL (ref 1.6–2.4)
MAGNESIUM SERPL-MCNC: 1.8 MG/DL (ref 1.6–2.4)
MCH RBC QN AUTO: 29.9 PG (ref 26–35)
MCH RBC QN AUTO: 30.8 PG (ref 26–35)
MCHC RBC AUTO-ENTMCNC: 34 G/DL (ref 32–34.5)
MCHC RBC AUTO-ENTMCNC: 35.9 G/DL (ref 32–34.5)
MCV RBC AUTO: 86 FL (ref 80–99.9)
MCV RBC AUTO: 88.1 FL (ref 80–99.9)
METAMYELOCYTES ABSOLUTE COUNT: 0.31 K/UL (ref 0–0.12)
METAMYELOCYTES: 4 % (ref 0–1)
MONOCYTES NFR BLD: 0.31 K/UL (ref 0.1–0.95)
MONOCYTES NFR BLD: 1.12 K/UL (ref 0.1–0.95)
MONOCYTES NFR BLD: 11 % (ref 2–12)
MONOCYTES NFR BLD: 4 % (ref 2–12)
MYELOCYTES ABSOLUTE COUNT: 0.08 K/UL
MYELOCYTES ABSOLUTE COUNT: 0.1 K/UL
MYELOCYTES: 1 %
MYELOCYTES: 1 %
NEUTROPHILS NFR BLD: 64 % (ref 43–80)
NEUTROPHILS NFR BLD: 71 % (ref 43–80)
NEUTS SEG NFR BLD: 5.65 K/UL (ref 1.8–7.3)
NEUTS SEG NFR BLD: 7.24 K/UL (ref 1.8–7.3)
NITRITE UR QL STRIP: NEGATIVE
NUCLEATED RED BLOOD CELLS: 4 PER 100 WBC
NUCLEATED RED BLOOD CELLS: 8 PER 100 WBC
OSMOLALITY SERPL: 264 MOSM/KG (ref 285–310)
OSMOLALITY UR: 421 MOSM/KG (ref 300–900)
PH UR STRIP: 6 [PH] (ref 5–8)
PLATELET # BLD AUTO: 89 K/UL (ref 130–450)
PLATELET # BLD AUTO: 95 K/UL (ref 130–450)
PLATELET CONFIRMATION: NORMAL
PLATELET CONFIRMATION: NORMAL
PMV BLD AUTO: 11.2 FL (ref 7–12)
PMV BLD AUTO: 12.3 FL (ref 7–12)
POTASSIUM SERPL-SCNC: 3.1 MMOL/L (ref 3.5–5.1)
POTASSIUM SERPL-SCNC: 3.2 MMOL/L (ref 3.5–5.1)
POTASSIUM SERPL-SCNC: 3.2 MMOL/L (ref 3.5–5.1)
POTASSIUM SERPL-SCNC: 3.5 MMOL/L (ref 3.5–5.1)
PROT SERPL-MCNC: 6.6 G/DL (ref 6.4–8.3)
PROT UR STRIP-MCNC: NEGATIVE MG/DL
PROTHROMBIN TIME: 14.1 SEC (ref 9.3–12.4)
RBC # BLD AUTO: 2.14 M/UL (ref 3.5–5.5)
RBC # BLD AUTO: 2.44 M/UL (ref 3.5–5.5)
RBC # BLD: ABNORMAL 10*6/UL
RBC # BLD: ABNORMAL 10*6/UL
RBC #/AREA URNS HPF: ABNORMAL /HPF
SODIUM SERPL-SCNC: 118 MMOL/L (ref 136–145)
SODIUM SERPL-SCNC: 125 MMOL/L (ref 136–145)
SODIUM SERPL-SCNC: 127 MMOL/L (ref 136–145)
SODIUM SERPL-SCNC: 127 MMOL/L (ref 136–145)
SODIUM UR-SCNC: 62 MMOL/L
SP GR UR STRIP: 1.01 (ref 1–1.03)
TIBC SERPL-MCNC: 233 UG/DL (ref 250–450)
TROPONIN I SERPL HS-MCNC: 22 NG/L (ref 0–14)
TROPONIN I SERPL HS-MCNC: 22 NG/L (ref 0–14)
URATE SERPL-MCNC: 7 MG/DL (ref 2.4–5.7)
UROBILINOGEN UR STRIP-ACNC: 1 EU/DL (ref 0–1)
WBC #/AREA URNS HPF: ABNORMAL /HPF
WBC OTHER # BLD: 10.2 K/UL (ref 4.5–11.5)
WBC OTHER # BLD: 8.9 K/UL (ref 4.5–11.5)

## 2025-07-09 PROCEDURE — 84550 ASSAY OF BLOOD/URIC ACID: CPT

## 2025-07-09 PROCEDURE — 83540 ASSAY OF IRON: CPT

## 2025-07-09 PROCEDURE — 86900 BLOOD TYPING SEROLOGIC ABO: CPT

## 2025-07-09 PROCEDURE — 87086 URINE CULTURE/COLONY COUNT: CPT

## 2025-07-09 PROCEDURE — 83735 ASSAY OF MAGNESIUM: CPT

## 2025-07-09 PROCEDURE — 6370000000 HC RX 637 (ALT 250 FOR IP): Performed by: FAMILY MEDICINE

## 2025-07-09 PROCEDURE — 83550 IRON BINDING TEST: CPT

## 2025-07-09 PROCEDURE — 80053 COMPREHEN METABOLIC PANEL: CPT

## 2025-07-09 PROCEDURE — 74174 CTA ABD&PLVS W/CONTRAST: CPT

## 2025-07-09 PROCEDURE — 2500000003 HC RX 250 WO HCPCS: Performed by: HOSPITALIST

## 2025-07-09 PROCEDURE — 71045 X-RAY EXAM CHEST 1 VIEW: CPT

## 2025-07-09 PROCEDURE — 76705 ECHO EXAM OF ABDOMEN: CPT

## 2025-07-09 PROCEDURE — 36415 COLL VENOUS BLD VENIPUNCTURE: CPT

## 2025-07-09 PROCEDURE — 84484 ASSAY OF TROPONIN QUANT: CPT

## 2025-07-09 PROCEDURE — 86923 COMPATIBILITY TEST ELECTRIC: CPT

## 2025-07-09 PROCEDURE — 96374 THER/PROPH/DIAG INJ IV PUSH: CPT

## 2025-07-09 PROCEDURE — 81001 URINALYSIS AUTO W/SCOPE: CPT

## 2025-07-09 PROCEDURE — 84300 ASSAY OF URINE SODIUM: CPT

## 2025-07-09 PROCEDURE — 6360000002 HC RX W HCPCS: Performed by: HOSPITALIST

## 2025-07-09 PROCEDURE — 93005 ELECTROCARDIOGRAM TRACING: CPT | Performed by: EMERGENCY MEDICINE

## 2025-07-09 PROCEDURE — 83930 ASSAY OF BLOOD OSMOLALITY: CPT

## 2025-07-09 PROCEDURE — 80048 BASIC METABOLIC PNL TOTAL CA: CPT

## 2025-07-09 PROCEDURE — 82728 ASSAY OF FERRITIN: CPT

## 2025-07-09 PROCEDURE — 6360000002 HC RX W HCPCS: Performed by: EMERGENCY MEDICINE

## 2025-07-09 PROCEDURE — 6360000004 HC RX CONTRAST MEDICATION: Performed by: RADIOLOGY

## 2025-07-09 PROCEDURE — 93010 ELECTROCARDIOGRAM REPORT: CPT | Performed by: INTERNAL MEDICINE

## 2025-07-09 PROCEDURE — 83935 ASSAY OF URINE OSMOLALITY: CPT

## 2025-07-09 PROCEDURE — 2580000003 HC RX 258: Performed by: STUDENT IN AN ORGANIZED HEALTH CARE EDUCATION/TRAINING PROGRAM

## 2025-07-09 PROCEDURE — 85025 COMPLETE CBC W/AUTO DIFF WBC: CPT

## 2025-07-09 PROCEDURE — 99222 1ST HOSP IP/OBS MODERATE 55: CPT | Performed by: FAMILY MEDICINE

## 2025-07-09 PROCEDURE — 1200000000 HC SEMI PRIVATE

## 2025-07-09 PROCEDURE — 99285 EMERGENCY DEPT VISIT HI MDM: CPT

## 2025-07-09 PROCEDURE — 86901 BLOOD TYPING SEROLOGIC RH(D): CPT

## 2025-07-09 PROCEDURE — 86850 RBC ANTIBODY SCREEN: CPT

## 2025-07-09 PROCEDURE — 2580000003 HC RX 258: Performed by: EMERGENCY MEDICINE

## 2025-07-09 PROCEDURE — P9016 RBC LEUKOCYTES REDUCED: HCPCS

## 2025-07-09 PROCEDURE — 80074 ACUTE HEPATITIS PANEL: CPT

## 2025-07-09 PROCEDURE — 2500000003 HC RX 250 WO HCPCS: Performed by: FAMILY MEDICINE

## 2025-07-09 PROCEDURE — 93975 VASCULAR STUDY: CPT

## 2025-07-09 PROCEDURE — 30233N1 TRANSFUSION OF NONAUTOLOGOUS RED BLOOD CELLS INTO PERIPHERAL VEIN, PERCUTANEOUS APPROACH: ICD-10-PCS | Performed by: FAMILY MEDICINE

## 2025-07-09 PROCEDURE — 85610 PROTHROMBIN TIME: CPT

## 2025-07-09 RX ORDER — VITAMIN B COMPLEX
1 TABLET ORAL DAILY
Status: DISCONTINUED | OUTPATIENT
Start: 2025-07-09 | End: 2025-07-09 | Stop reason: CLARIF

## 2025-07-09 RX ORDER — ACETAMINOPHEN 325 MG/1
650 TABLET ORAL EVERY 6 HOURS PRN
Status: DISCONTINUED | OUTPATIENT
Start: 2025-07-09 | End: 2025-07-12 | Stop reason: HOSPADM

## 2025-07-09 RX ORDER — SODIUM CHLORIDE 0.9 % (FLUSH) 0.9 %
5-40 SYRINGE (ML) INJECTION PRN
Status: DISCONTINUED | OUTPATIENT
Start: 2025-07-09 | End: 2025-07-12 | Stop reason: HOSPADM

## 2025-07-09 RX ORDER — VITAMIN E 268 MG
400 CAPSULE ORAL DAILY
Status: DISCONTINUED | OUTPATIENT
Start: 2025-07-09 | End: 2025-07-12 | Stop reason: HOSPADM

## 2025-07-09 RX ORDER — LANOLIN ALCOHOL/MO/W.PET/CERES
250 CREAM (GRAM) TOPICAL DAILY
Status: DISCONTINUED | OUTPATIENT
Start: 2025-07-09 | End: 2025-07-12 | Stop reason: HOSPADM

## 2025-07-09 RX ORDER — LANOLIN ALCOHOL/MO/W.PET/CERES
100 CREAM (GRAM) TOPICAL DAILY
Status: DISCONTINUED | OUTPATIENT
Start: 2025-07-09 | End: 2025-07-12 | Stop reason: HOSPADM

## 2025-07-09 RX ORDER — SODIUM CHLORIDE 9 MG/ML
INJECTION, SOLUTION INTRAVENOUS PRN
Status: DISCONTINUED | OUTPATIENT
Start: 2025-07-09 | End: 2025-07-12 | Stop reason: HOSPADM

## 2025-07-09 RX ORDER — ACETAMINOPHEN 650 MG/1
650 SUPPOSITORY RECTAL EVERY 6 HOURS PRN
Status: DISCONTINUED | OUTPATIENT
Start: 2025-07-09 | End: 2025-07-12 | Stop reason: HOSPADM

## 2025-07-09 RX ORDER — ONDANSETRON 2 MG/ML
4 INJECTION INTRAMUSCULAR; INTRAVENOUS ONCE
Status: COMPLETED | OUTPATIENT
Start: 2025-07-09 | End: 2025-07-09

## 2025-07-09 RX ORDER — POTASSIUM CHLORIDE 7.45 MG/ML
10 INJECTION INTRAVENOUS ONCE
Status: COMPLETED | OUTPATIENT
Start: 2025-07-09 | End: 2025-07-09

## 2025-07-09 RX ORDER — VITAMIN B COMPLEX
2000 TABLET ORAL DAILY
Status: DISCONTINUED | OUTPATIENT
Start: 2025-07-09 | End: 2025-07-12 | Stop reason: HOSPADM

## 2025-07-09 RX ORDER — DEXTROSE MONOHYDRATE 50 MG/ML
INJECTION, SOLUTION INTRAVENOUS CONTINUOUS
Status: DISCONTINUED | OUTPATIENT
Start: 2025-07-09 | End: 2025-07-10

## 2025-07-09 RX ORDER — ENOXAPARIN SODIUM 100 MG/ML
40 INJECTION SUBCUTANEOUS DAILY
Status: DISCONTINUED | OUTPATIENT
Start: 2025-07-09 | End: 2025-07-09

## 2025-07-09 RX ORDER — IOPAMIDOL 755 MG/ML
75 INJECTION, SOLUTION INTRAVASCULAR
Status: COMPLETED | OUTPATIENT
Start: 2025-07-09 | End: 2025-07-09

## 2025-07-09 RX ORDER — 0.9 % SODIUM CHLORIDE 0.9 %
1000 INTRAVENOUS SOLUTION INTRAVENOUS ONCE
Status: COMPLETED | OUTPATIENT
Start: 2025-07-09 | End: 2025-07-09

## 2025-07-09 RX ORDER — SODIUM CHLORIDE 0.9 % (FLUSH) 0.9 %
5-40 SYRINGE (ML) INJECTION EVERY 12 HOURS SCHEDULED
Status: DISCONTINUED | OUTPATIENT
Start: 2025-07-09 | End: 2025-07-12 | Stop reason: HOSPADM

## 2025-07-09 RX ORDER — LEVOTHYROXINE SODIUM 125 UG/1
125 TABLET ORAL DAILY
Status: DISCONTINUED | OUTPATIENT
Start: 2025-07-09 | End: 2025-07-12 | Stop reason: HOSPADM

## 2025-07-09 RX ADMIN — LEVOTHYROXINE SODIUM 125 MCG: 0.12 TABLET ORAL at 11:56

## 2025-07-09 RX ADMIN — ONDANSETRON 4 MG: 2 INJECTION, SOLUTION INTRAMUSCULAR; INTRAVENOUS at 05:07

## 2025-07-09 RX ADMIN — POTASSIUM CHLORIDE 10 MEQ: 7.46 INJECTION, SOLUTION INTRAVENOUS at 06:49

## 2025-07-09 RX ADMIN — IOPAMIDOL 75 ML: 755 INJECTION, SOLUTION INTRAVENOUS at 15:24

## 2025-07-09 RX ADMIN — Medication 10 ML: at 21:29

## 2025-07-09 RX ADMIN — SODIUM CHLORIDE, PRESERVATIVE FREE 40 MG: 5 INJECTION INTRAVENOUS at 12:00

## 2025-07-09 RX ADMIN — SODIUM CHLORIDE 1000 ML: 0.9 INJECTION, SOLUTION INTRAVENOUS at 04:58

## 2025-07-09 RX ADMIN — Medication 2000 UNITS: at 11:57

## 2025-07-09 RX ADMIN — Medication 10 ML: at 11:58

## 2025-07-09 RX ADMIN — ACETAMINOPHEN 650 MG: 325 TABLET ORAL at 21:24

## 2025-07-09 RX ADMIN — DEXTROSE: 5 SOLUTION INTRAVENOUS at 21:44

## 2025-07-09 RX ADMIN — PYRIDOXINE HCL TAB 50 MG 100 MG: 50 TAB at 11:58

## 2025-07-09 RX ADMIN — SODIUM CHLORIDE, PRESERVATIVE FREE 40 MG: 5 INJECTION INTRAVENOUS at 21:26

## 2025-07-09 ASSESSMENT — PAIN SCALES - GENERAL: PAINLEVEL_OUTOF10: 5

## 2025-07-09 ASSESSMENT — ENCOUNTER SYMPTOMS
BACK PAIN: 0
EYE REDNESS: 0
COUGH: 0
ABDOMINAL DISTENTION: 0
SORE THROAT: 0
VOMITING: 0
SINUS PRESSURE: 0
SHORTNESS OF BREATH: 0
WHEEZING: 0
NAUSEA: 0
EYE DISCHARGE: 0
DIARRHEA: 0
EYE PAIN: 0

## 2025-07-09 ASSESSMENT — PAIN - FUNCTIONAL ASSESSMENT: PAIN_FUNCTIONAL_ASSESSMENT: NONE - DENIES PAIN

## 2025-07-09 ASSESSMENT — PAIN DESCRIPTION - DESCRIPTORS: DESCRIPTORS: ACHING;DISCOMFORT;SPASM

## 2025-07-09 ASSESSMENT — PAIN DESCRIPTION - ORIENTATION: ORIENTATION: RIGHT;LEFT

## 2025-07-09 ASSESSMENT — PAIN DESCRIPTION - LOCATION: LOCATION: LEG

## 2025-07-09 NOTE — H&P
[facility] Hospitalist Group   History and Physical      CHIEF COMPLAINT: Weakness    History of Present Illness:  75 y.o. female with a history of Barry's disease, hypothyroid, GERD, breast cancer presents with weakness.  She states this has been ongoing since last month when she was diagnosed with kidney disease.  She was admitted last month and treated with IV fluid and her diuretics and losartan were held.  Her Synthroid dose was decreased because of exogenous hyperthyroidism workup today was significant for sodium 118 potassium 3.1, CO2 20 glucose 168, alk phos 171, ALT 40, AST 93, T. bili 1.3, hemoglobin 6.6 platelet 95.  Chest x-ray done and read is pending.  1 unit of blood ordered in the emergency room.    Informant(s) for H&P: Patient, chart    REVIEW OF SYSTEMS:  no fevers, chills, cp, sob, n/v, ha, vision/hearing changes, wt changes, hot/cold flashes, other open skin lesions, diarrhea, constipation, dysuria/hematuria unless noted in HPI. Complete ROS performed with the patient and is otherwise negative.      PMH:  Past Medical History:   Diagnosis Date    Breast cancer (HCC)     Cancer (HCC) 06/26/2023    Left breast IDC, ER positive.    Colon cancer screening     GERD (gastroesophageal reflux disease)     Yosi disease (HCC)     Thyroid disease        Surgical History:  Past Surgical History:   Procedure Laterality Date    APPENDECTOMY      COLONOSCOPY      COLONOSCOPY N/A 5/28/2021    COLONOSCOPY DIAGNOSTIC performed by Yuridia Taveras MD at [facility] ENDOSCOPY    HERNIA REPAIR      HYSTERECTOMY (CERVIX STATUS UNKNOWN)      THYROID SURGERY      UPPER GASTROINTESTINAL ENDOSCOPY N/A 5/28/2021    EGD BIOPSY performed by Yuirdia Taveras MD at [facility] ENDOSCOPY    US BREAST BIOPSY W LOC DEVICE 1ST LESION LEFT Left 6/26/2023    US BREAST BIOPSY W LOC DEVICE 1ST LESION LEFT 6/26/2023 [facility]       Medications Prior to Admission:    Prior to Admission medications    Medication Sig

## 2025-07-09 NOTE — CONSENT
Informed Consent for Blood Component Transfusion Note    I have discussed with the patient the rationale for blood component transfusion; its benefits in treating or preventing fatigue, organ damage, or death; and its risk which includes mild transfusion reactions, rare risk of blood borne infection, or more serious but rare reactions. I have discussed the alternatives to transfusion, including the risk and consequences of not receiving transfusion. The patient had an opportunity to ask questions and had agreed to proceed with transfusion of blood components.    Electronically signed by Juan Gomez DO on 7/9/25 at 5:07 AM EDT

## 2025-07-09 NOTE — ED PROVIDER NOTES
Patient is a 74 y/o female who presents to the ED via EMS with generalized weakness. Patient states that the weakness began a few days ago. She states that her legs will not work. She is unable to walk. She states that she has acute renal failure and believes this is the cause. She is urinating normally. She did have diarrhea a few days ago. She denies any fever. She denies any lower back pain. She denies any falls or injury.         Review of Systems   Constitutional:  Positive for fatigue. Negative for chills and fever.   HENT:  Negative for ear pain, sinus pressure and sore throat.    Eyes:  Negative for pain, discharge and redness.   Respiratory:  Negative for cough, shortness of breath and wheezing.    Cardiovascular:  Negative for chest pain.   Gastrointestinal:  Negative for abdominal distention, diarrhea, nausea and vomiting.   Genitourinary:  Negative for dysuria and frequency.   Musculoskeletal:  Negative for arthralgias and back pain.   Skin:  Negative for rash and wound.   Neurological:  Positive for weakness. Negative for headaches.   Hematological:  Negative for adenopathy.   All other systems reviewed and are negative.       Physical Exam  Vitals and nursing note reviewed.   Constitutional:       General: She is not in acute distress.  HENT:      Head: Normocephalic and atraumatic.      Right Ear: External ear normal.      Left Ear: External ear normal.      Nose: Nose normal.      Mouth/Throat:      Mouth: Mucous membranes are moist.   Eyes:      Conjunctiva/sclera: Conjunctivae normal.      Pupils: Pupils are equal, round, and reactive to light.   Cardiovascular:      Rate and Rhythm: Normal rate and regular rhythm.      Heart sounds: No murmur heard.  Pulmonary:      Effort: Pulmonary effort is normal. No respiratory distress.      Breath sounds: Normal breath sounds. No stridor. No wheezing, rhonchi or rales.   Abdominal:      General: Bowel sounds are normal. There is no distension.

## 2025-07-09 NOTE — TELEPHONE ENCOUNTER
RICK------Daughter called stating brother had ambulance take mom to hospital at 2:30am. She was c/o she couldn't walk anymore, too weak, incontinent and off. Took her to [facility].

## 2025-07-09 NOTE — CONSULTS
Nephrology Consult Note  Patient's Name: Marie Meehan  11:36 AM  7/9/2025    Nephrologist: None prior    Reason for Consult:  hyponatremia  PCP: Martina Trevino DO  Requesting Physician: Kyung Tidwell DO     Chief Complaint: Weakness, unable to ambulate    History Obtained From: Patient, chart    History of Present Illness:    Marie Meehan is a 75 y.o. female with PMHx breast cancer, GERD, [place]'s disease, hypothyroidism.  She also had recent history of acute kidney injury, baseline serum creatinine generally appears to be in the range of stage II chronic kidney disease with baseline creatinine 0.9 to 1.0 mg/dL.  Nicky 15, 2025 creatinine was 1.6 mg/dL later peaking at 1.8 mg/dL on 6/17 improving to 1.2 mg/dL at discharge on 6/19.  She recently had labs on 6/30 with creatinine 1.3 mg/dL.  She has not seen a nephrologist in the past.    Patient was just admitted at this facility from 6/17 through 6/19 after having signed out of the emergency department on 6/15 AMA when she was admitted with fatigue.  She was treated with IV fluids.  Losartan was held, hydrochlorothiazide was held not restarted, vitamin D supplementation increased.  Low PTH noted.  She has been hypercalcemic on multiple evaluations in the past.    She was advised to restart losartan at her last appointment with her primary care physician on 6/30.  There is a note in the chart from her PCP medical assistant that she had trouble walking.  She presented to the emergency department overnight/early this morning with generalized weakness.  She states she is unable to walk and that her legs do not work.  The patient felt the cause of this was acute renal failure.  However labs showed normal renal function with creatinine 0.9 mg/dL.  However she was severely hyponatremic with a sodium level of 118 mmol/L. She also had profound anemia hemoglobin 6.6 g/dL.  Serum potassium also low at 3.1 mmol/L.  Magnesium has not been checked

## 2025-07-09 NOTE — CONSULTS
CONSULT  Morgan Dc M.D.  [facility]  Dr. Moustapha Fay M.D.,  Dr. Sage Silva M.D.,  Dr. Brian Zendejas D.O.,  Dr. Jonathan Boo D.O. ,  Dr. Shlomo Joya M.D.,          Marie Meehan  75 y.o.  female      Re: \"anemia, concern for gi bleed\"  Requesting physician: Dr. Diop  Date:7:32 AM 7/9/2025          HPI: 75-year-old female patient seen in the hospital for above described issue.  She has presented to the emergency department with chief complaint of weakness.  Patient has history of [place]'s disease, hypothyroidism, GERD, breast cancer.  She also has history of anemia.  She was recently in the hospital and discharged on 19 June after being treated for acute kidney injury.  At that time patient hemoglobin was 10.8 g/dL on 17 June with repeat hemoglobin of 7.8 on 30 June of this year.  On presentation today hemoglobin is 6.6 with hematocrit of 18.4.  MCV is 86.  Platelet count is 95 which is new for this patient.  White blood cell count is 10.2.  Patient denies abdominal pain.  She denies nausea vomiting.  She reports sometimes issues with swallowing but generally tolerates regular diet.  Patient denies hematemesis emesis of coffee-ground material.  She denies blood in the stool or melena.  She reports previous endoscopy by Dr. Taveras.  Review of medical records indicates unremarkable EGD and colonoscopy in May 2021.  Biopsies from this procedure reported mild chronic gastritis with lymphoid aggregates with stains negative for H. pylori and negative for intestinal metaplasia.  Reported subcutaneous lipoma antrum of the stomach.  Additional laboratory work on current presentation reveals significant hyponatremia, BUN of 31 with creatinine 0.9.  Liver profile show shows elevated transaminases, and elevated alkaline phosphatase and an elevated bilirubin.  Abdominal imaging has not been obtained    Information sources:   -Patient  -medical record  -health care team    PMHx:  Past  11.2 07/09/2025 04:45 AM     Lab Results   Component Value Date/Time     07/09/2025 04:45 AM    K 3.1 07/09/2025 04:45 AM    CL 83 07/09/2025 04:45 AM    CO2 20 07/09/2025 04:45 AM    BUN 31 07/09/2025 04:45 AM    CREATININE 0.9 07/09/2025 04:45 AM    CALCIUM 9.2 07/09/2025 04:45 AM    BILITOT 1.3 07/09/2025 04:45 AM    ALKPHOS 171 07/09/2025 04:45 AM    AST 93 07/09/2025 04:45 AM    ALT 40 07/09/2025 04:45 AM     No results found for: \"LIPASE\"  No results found for: \"AMYLASE\"      ASSESSMENT/PLAN:  Patient Active Problem List   Diagnosis    Malignant neoplasm of upper-inner quadrant of left breast in female, estrogen receptor positive (HCC)    Hearing problem of both ears    Vitamin D deficiency    Postoperative hypothyroidism    GERD (gastroesophageal reflux disease)    HTN (hypertension)    Yosi's disease (HCC)    Acquired hypothyroidism    SVT (supraventricular tachycardia)    Hypercalcemia    Hyperthyroidism-- exogenous    Carlisle chorea (HCC)    Hyponatremia     1.  Anemia  - Acute on chronic  - No evidence of bleed  -normocytic  - Obtain iron studies before blood transfusion  - Obtain FOBT  - Obtain INR   - Obtain dedicated abdominal imaging  - Monitor H&H; defer transfusion to admitting  - Elevate head of bed; oxygen nasal cannula  - Twice a day IV PPI  - Further evaluation with upper endoscopy followed by colonoscopy inpatient versus outpatient pending clinical course/H&H dynamics and resolution of hyponatremia    2.  Hepatitis/transaminitis   - Obtain INR as above   - Obtain liver workup including dedicated imaging   - Monitor labs     3.  Thrombocytopenia  - New  - Consider hematology evaluation    4.  Hyponatremia  -Severe  - Per admitting/pertinent consultants    5.  Comorbidities -Carlisle's disease, etc.    Above has been discussed with the patient and all questions answered to her satisfaction.  She verbalized understanding and agreement with the plan as delineated.    Thank you for

## 2025-07-09 NOTE — PROGRESS NOTES
4 Eyes Skin Assessment     NAME:  Marie Meehan  YOB: 1950  MEDICAL RECORD NUMBER:  [id number]    The patient is being assessed for  Admission    I agree that at least one RN has performed a thorough Head to Toe Skin Assessment on the patient. ALL assessment sites listed below have been assessed.      Areas assessed by both nurses:    Head, Face, Ears, Shoulders, Back, Chest, Arms, Elbows, Hands, Sacrum. Buttock, Coccyx, Ischium, and Legs. Feet and Heels        Does the Patient have a Wound? No noted wound(s)       Carl Prevention initiated by RN: No  Wound Care Orders initiated by RN: No    Pressure Injury (Stage 1,2,3,4, Unstageable, DTI, NWPT, and Complex wounds) if present, place Wound referral order by RN under : No    New Ostomies, if present place, Ostomy referral order under : No     Nurse 1 eSignature: Electronically signed by Braeden Cagle RN on 7/9/25 at 4:46 PM EDT    **SHARE this note so that the co-signing nurse can place an eSignature**    Nurse 2 eSignature: Electronically signed by Kaila Nunez LPN on 7/9/25 at 5:20 PM EDT

## 2025-07-09 NOTE — PROGRESS NOTES
Pharmacy Note    Mariebre Solis Adalid Meehan was ordered CoQ10.  As per the [facility] Formul[facility] Committee Policy, herbals and certain dietary supplements will be discontinued.  The herbal or dietary supplement may be continued after discharge from the hospital.  These changes were made per protocol according to the [facility] Scope of Practice Policy.      Please contact pharmacy with any questions regarding these changes.    Neida Mcdaniels, PharmD.  7/9/2025   7:11 AM

## 2025-07-10 ENCOUNTER — APPOINTMENT (OUTPATIENT)
Dept: CT IMAGING | Age: 75
DRG: 644 | End: 2025-07-10
Payer: MEDICARE

## 2025-07-10 PROBLEM — D64.9 SYMPTOMATIC ANEMIA: Status: ACTIVE | Noted: 2025-07-10

## 2025-07-10 PROBLEM — C79.51 MALIGNANT NEOPLASM METASTATIC TO BONE (HCC): Status: ACTIVE | Noted: 2025-07-10

## 2025-07-10 PROBLEM — E87.6 HYPOKALEMIA: Status: ACTIVE | Noted: 2025-07-10

## 2025-07-10 PROBLEM — E87.1 ACUTE HYPONATREMIA: Status: ACTIVE | Noted: 2025-07-10

## 2025-07-10 PROBLEM — R53.1 GENERALIZED WEAKNESS: Status: ACTIVE | Noted: 2025-07-10

## 2025-07-10 LAB
ANION GAP SERPL CALCULATED.3IONS-SCNC: 12 MMOL/L (ref 7–16)
BASOPHILS # BLD: 0 K/UL (ref 0–0.2)
BASOPHILS NFR BLD: 0 % (ref 0–2)
BUN SERPL-MCNC: 20 MG/DL (ref 8–23)
CALCIUM SERPL-MCNC: 8.4 MG/DL (ref 8.8–10.2)
CALCIUM SERPL-MCNC: 8.4 MG/DL (ref 8.8–10.2)
CALCIUM SERPL-MCNC: 8.8 MG/DL (ref 8.8–10.2)
CHLORIDE SERPL-SCNC: 100 MMOL/L (ref 98–107)
CHLORIDE SERPL-SCNC: 97 MMOL/L (ref 98–107)
CHLORIDE SERPL-SCNC: 98 MMOL/L (ref 98–107)
CO2 SERPL-SCNC: 19 MMOL/L (ref 22–29)
CO2 SERPL-SCNC: 21 MMOL/L (ref 22–29)
CO2 SERPL-SCNC: 21 MMOL/L (ref 22–29)
CREAT SERPL-MCNC: 1 MG/DL (ref 0.5–1)
CREAT SERPL-MCNC: 1.1 MG/DL (ref 0.5–1)
CREAT SERPL-MCNC: 1.1 MG/DL (ref 0.5–1)
EOSINOPHIL # BLD: 0 K/UL (ref 0.05–0.5)
EOSINOPHIL # BLD: 0.07 K/UL (ref 0.05–0.5)
EOSINOPHIL # BLD: 0.07 K/UL (ref 0.05–0.5)
EOSINOPHILS RELATIVE PERCENT: 0 % (ref 0–6)
EOSINOPHILS RELATIVE PERCENT: 1 % (ref 0–6)
EOSINOPHILS RELATIVE PERCENT: 1 % (ref 0–6)
ERYTHROCYTE [DISTWIDTH] IN BLOOD BY AUTOMATED COUNT: 19.2 % (ref 11.5–15)
ERYTHROCYTE [DISTWIDTH] IN BLOOD BY AUTOMATED COUNT: 20.2 % (ref 11.5–15)
ERYTHROCYTE [DISTWIDTH] IN BLOOD BY AUTOMATED COUNT: 20.6 % (ref 11.5–15)
GFR, ESTIMATED: 52 ML/MIN/1.73M2
GFR, ESTIMATED: 52 ML/MIN/1.73M2
GFR, ESTIMATED: 57 ML/MIN/1.73M2
GLUCOSE SERPL-MCNC: 108 MG/DL (ref 74–99)
GLUCOSE SERPL-MCNC: 147 MG/DL (ref 74–99)
GLUCOSE SERPL-MCNC: 168 MG/DL (ref 74–99)
HCT VFR BLD AUTO: 21.1 % (ref 34–48)
HCT VFR BLD AUTO: 22.2 % (ref 34–48)
HCT VFR BLD AUTO: 22.9 % (ref 34–48)
HGB BLD-MCNC: 7.3 G/DL (ref 11.5–15.5)
HGB BLD-MCNC: 7.8 G/DL (ref 11.5–15.5)
HGB BLD-MCNC: 7.8 G/DL (ref 11.5–15.5)
LYMPHOCYTES NFR BLD: 0.81 K/UL (ref 1.5–4)
LYMPHOCYTES NFR BLD: 1.42 K/UL (ref 1.5–4)
LYMPHOCYTES NFR BLD: 1.46 K/UL (ref 1.5–4)
LYMPHOCYTES RELATIVE PERCENT: 11 % (ref 20–42)
LYMPHOCYTES RELATIVE PERCENT: 17 % (ref 20–42)
LYMPHOCYTES RELATIVE PERCENT: 20 % (ref 20–42)
MAGNESIUM SERPL-MCNC: 1.9 MG/DL (ref 1.6–2.4)
MAGNESIUM SERPL-MCNC: 2 MG/DL (ref 1.6–2.4)
MCH RBC QN AUTO: 30.2 PG (ref 26–35)
MCH RBC QN AUTO: 30.5 PG (ref 26–35)
MCH RBC QN AUTO: 31.5 PG (ref 26–35)
MCHC RBC AUTO-ENTMCNC: 34.1 G/DL (ref 32–34.5)
MCHC RBC AUTO-ENTMCNC: 34.6 G/DL (ref 32–34.5)
MCHC RBC AUTO-ENTMCNC: 35.1 G/DL (ref 32–34.5)
MCV RBC AUTO: 87.2 FL (ref 80–99.9)
MCV RBC AUTO: 89.5 FL (ref 80–99.9)
MCV RBC AUTO: 89.5 FL (ref 80–99.9)
METAMYELOCYTES ABSOLUTE COUNT: 0.15 K/UL (ref 0–0.12)
METAMYELOCYTES ABSOLUTE COUNT: 0.29 K/UL (ref 0–0.12)
METAMYELOCYTES ABSOLUTE COUNT: 0.3 K/UL (ref 0–0.12)
METAMYELOCYTES: 2 % (ref 0–1)
METAMYELOCYTES: 4 % (ref 0–1)
METAMYELOCYTES: 4 % (ref 0–1)
MICROORGANISM SPEC CULT: ABNORMAL
MICROORGANISM SPEC CULT: ABNORMAL
MONOCYTES NFR BLD: 0.15 K/UL (ref 0.1–0.95)
MONOCYTES NFR BLD: 0.22 K/UL (ref 0.1–0.95)
MONOCYTES NFR BLD: 0.37 K/UL (ref 0.1–0.95)
MONOCYTES NFR BLD: 2 % (ref 2–12)
MONOCYTES NFR BLD: 3 % (ref 2–12)
MONOCYTES NFR BLD: 5 % (ref 2–12)
MYELOCYTES ABSOLUTE COUNT: 0.07 K/UL
MYELOCYTES ABSOLUTE COUNT: 0.15 K/UL
MYELOCYTES ABSOLUTE COUNT: 0.15 K/UL
MYELOCYTES: 1 %
MYELOCYTES: 2 %
MYELOCYTES: 2 %
NEUTROPHILS NFR BLD: 68 % (ref 43–80)
NEUTROPHILS NFR BLD: 77 % (ref 43–80)
NEUTROPHILS NFR BLD: 80 % (ref 43–80)
NEUTS SEG NFR BLD: 4.96 K/UL (ref 1.8–7.3)
NEUTS SEG NFR BLD: 5.92 K/UL (ref 1.8–7.3)
NEUTS SEG NFR BLD: 6.34 K/UL (ref 1.8–7.3)
NUCLEATED RED BLOOD CELLS: 10 PER 100 WBC
NUCLEATED RED BLOOD CELLS: 12 PER 100 WBC
NUCLEATED RED BLOOD CELLS: 14 PER 100 WBC
PLATELET # BLD AUTO: 81 K/UL (ref 130–450)
PLATELET # BLD AUTO: 82 K/UL (ref 130–450)
PLATELET # BLD AUTO: 83 K/UL (ref 130–450)
PLATELET CONFIRMATION: NORMAL
PMV BLD AUTO: 11.3 FL (ref 7–12)
PMV BLD AUTO: 12.3 FL (ref 7–12)
PMV BLD AUTO: 12.7 FL (ref 7–12)
POTASSIUM SERPL-SCNC: 3.4 MMOL/L (ref 3.5–5.1)
POTASSIUM SERPL-SCNC: 3.4 MMOL/L (ref 3.5–5.1)
POTASSIUM SERPL-SCNC: 3.5 MMOL/L (ref 3.5–5.1)
RBC # BLD AUTO: 2.42 M/UL (ref 3.5–5.5)
RBC # BLD AUTO: 2.48 M/UL (ref 3.5–5.5)
RBC # BLD AUTO: 2.56 M/UL (ref 3.5–5.5)
RBC # BLD: ABNORMAL 10*6/UL
SERVICE CMNT-IMP: ABNORMAL
SODIUM SERPL-SCNC: 130 MMOL/L (ref 136–145)
SODIUM SERPL-SCNC: 130 MMOL/L (ref 136–145)
SODIUM SERPL-SCNC: 133 MMOL/L (ref 136–145)
SPECIMEN DESCRIPTION: ABNORMAL
WBC OTHER # BLD: 7.3 K/UL (ref 4.5–11.5)
WBC OTHER # BLD: 7.4 K/UL (ref 4.5–11.5)
WBC OTHER # BLD: 8.2 K/UL (ref 4.5–11.5)

## 2025-07-10 PROCEDURE — 1200000000 HC SEMI PRIVATE

## 2025-07-10 PROCEDURE — 82542 COL CHROMOTOGRAPHY QUAL/QUAN: CPT

## 2025-07-10 PROCEDURE — 71270 CT THORAX DX C-/C+: CPT

## 2025-07-10 PROCEDURE — 6360000004 HC RX CONTRAST MEDICATION: Performed by: RADIOLOGY

## 2025-07-10 PROCEDURE — 2580000003 HC RX 258: Performed by: STUDENT IN AN ORGANIZED HEALTH CARE EDUCATION/TRAINING PROGRAM

## 2025-07-10 PROCEDURE — 6360000002 HC RX W HCPCS: Performed by: HOSPITALIST

## 2025-07-10 PROCEDURE — 6370000000 HC RX 637 (ALT 250 FOR IP): Performed by: FAMILY MEDICINE

## 2025-07-10 PROCEDURE — 83735 ASSAY OF MAGNESIUM: CPT

## 2025-07-10 PROCEDURE — 2500000003 HC RX 250 WO HCPCS: Performed by: FAMILY MEDICINE

## 2025-07-10 PROCEDURE — 2500000003 HC RX 250 WO HCPCS: Performed by: HOSPITALIST

## 2025-07-10 PROCEDURE — 36415 COLL VENOUS BLD VENIPUNCTURE: CPT

## 2025-07-10 PROCEDURE — 99232 SBSQ HOSP IP/OBS MODERATE 35: CPT | Performed by: FAMILY MEDICINE

## 2025-07-10 PROCEDURE — 80048 BASIC METABOLIC PNL TOTAL CA: CPT

## 2025-07-10 PROCEDURE — 6370000000 HC RX 637 (ALT 250 FOR IP)

## 2025-07-10 PROCEDURE — 85025 COMPLETE CBC W/AUTO DIFF WBC: CPT

## 2025-07-10 RX ORDER — IOPAMIDOL 755 MG/ML
75 INJECTION, SOLUTION INTRAVASCULAR
Status: COMPLETED | OUTPATIENT
Start: 2025-07-10 | End: 2025-07-10

## 2025-07-10 RX ORDER — ANASTROZOLE 1 MG/1
1 TABLET ORAL DAILY
Status: DISCONTINUED | OUTPATIENT
Start: 2025-07-10 | End: 2025-07-12 | Stop reason: HOSPADM

## 2025-07-10 RX ORDER — MAGNESIUM SULFATE IN WATER 40 MG/ML
2000 INJECTION, SOLUTION INTRAVENOUS ONCE
Status: DISCONTINUED | OUTPATIENT
Start: 2025-07-10 | End: 2025-07-10

## 2025-07-10 RX ORDER — DEXTROSE MONOHYDRATE 50 MG/ML
INJECTION, SOLUTION INTRAVENOUS CONTINUOUS
Status: DISCONTINUED | OUTPATIENT
Start: 2025-07-10 | End: 2025-07-11

## 2025-07-10 RX ADMIN — Medication 2000 UNITS: at 09:43

## 2025-07-10 RX ADMIN — SODIUM CHLORIDE, PRESERVATIVE FREE 40 MG: 5 INJECTION INTRAVENOUS at 09:31

## 2025-07-10 RX ADMIN — PYRIDOXINE HCL TAB 50 MG 100 MG: 50 TAB at 09:44

## 2025-07-10 RX ADMIN — SODIUM CHLORIDE, PRESERVATIVE FREE 40 MG: 5 INJECTION INTRAVENOUS at 21:42

## 2025-07-10 RX ADMIN — DEXTROSE: 5 SOLUTION INTRAVENOUS at 15:51

## 2025-07-10 RX ADMIN — LEVOTHYROXINE SODIUM 125 MCG: 0.12 TABLET ORAL at 06:13

## 2025-07-10 RX ADMIN — ANASTROZOLE 1 MG: 1 TABLET ORAL at 17:26

## 2025-07-10 RX ADMIN — IOPAMIDOL 75 ML: 755 INJECTION, SOLUTION INTRAVENOUS at 16:47

## 2025-07-10 RX ADMIN — Medication 10 ML: at 09:35

## 2025-07-10 RX ADMIN — Medication 400 UNITS: at 09:43

## 2025-07-10 RX ADMIN — CYANOCOBALAMIN TAB 1000 MCG 250 MCG: 1000 TAB at 09:44

## 2025-07-10 RX ADMIN — Medication 10 ML: at 20:33

## 2025-07-10 NOTE — PROGRESS NOTES
or kizzy.  Heart: Regular rate and rhythm. No S3 gallop. No  murmrur.  Abdomen: Soft non distended, non tender and normal bowel sounds.  Extremeties: No edema.          DATA:        CTA ABDOMEN PELVIS W CONTRAST   Final Result   Metastatic osseous lesions, new from prior exam.  Otherwise no acute process.   No GI bleed.      A 7 mm nonobstructive calculus at the lower pole of the left kidney. No   hydronephrosis.         US DUP ABD PEL RETRO SCROT COMPLETE   Final Result   Likely hemangioma within the right hepatic dome.  A CT of the abdomen pelvis   has been performed.         US LIVER   Final Result   Likely hemangioma within the right hepatic dome.  A CT of the abdomen pelvis   has been performed.         XR CHEST PORTABLE   Final Result   Low lung volumes with cardiac prominence and vascular crowding versus central   congestion without focal consolidation or significant effusion.               Labs:    CBC:   Recent Labs     07/09/25  1200 07/09/25  2213 07/10/25  1327   WBC 8.9 7.4 7.3   HGB 7.3* 7.3* 7.8*   PLT 89* 81* 82*        Lab Results   Component Value Date    IRON 212 (H) 07/09/2025    TIBC 233 (L) 07/09/2025    FERRITIN 4,090 07/09/2025       Lab Results   Component Value Date    CALCIUM 8.8 07/10/2025    CALCIUM 8.2 (L) 07/09/2025    CALCIUM 8.2 (L) 07/09/2025    PHOS 3.1 06/19/2025    PHOS 3.3 06/18/2025    MG 1.9 07/10/2025    MG 1.8 07/09/2025    MG 1.8 07/09/2025       BMP:   Recent Labs     07/09/25  1848 07/09/25  2213 07/10/25  1151   * 127* 130*   K 3.2* 3.2* 3.4*   CL 93* 95* 98   CO2 20* 20* 19*   BUN 24* 24* 20   CREATININE 1.0 1.0 1.1*   GLUCOSE 134* 133* 108*             Labs:  CBC with Differential:    Lab Results   Component Value Date/Time    WBC 7.3 07/10/2025 01:27 PM    RBC 2.48 07/10/2025 01:27 PM    HGB 7.8 07/10/2025 01:27 PM    HCT 22.2 07/10/2025 01:27 PM    PLT 82 07/10/2025 01:27 PM    MCV 89.5 07/10/2025 01:27 PM    MCH 31.5 07/10/2025 01:27 PM    MCHC 35.1  Results   Component Value Date/Time    TRIG 173 02/02/2023 10:31 AM    HDL 45 02/02/2023 10:31 AM     TSH:    Lab Results   Component Value Date/Time    TSH 0.07 06/18/2025 05:33 AM     VITAMIN B12: No components found for: \"B12\"  FOLATE:  No results found for: \"FOLATE\"  IRON:    Lab Results   Component Value Date/Time    IRON 212 07/09/2025 12:03 PM     Iron Saturation:  No components found for: \"PERCENTFE\"  TIBC:    Lab Results   Component Value Date/Time    TIBC 233 07/09/2025 12:03 PM     FERRITIN:    Lab Results   Component Value Date/Time    FERRITIN 4,090 07/09/2025 12:03 PM        Imaging:  Available imaging reviewed    Assessment/Plan:    1-hyponatremia, w/overcorrection  -Na 118 (7/9 approx 5 AM)  -s/p 1L NS  -Na 125 (7/9 approx 1200)  -aim for Na +6-8 24/hr  -urine Na 62 and Urine osm 421 favor SIADH possibly d/t malignancy  -Na now 130 on 7/10 at 12 pm making 12 mmol/L correction over 30 hours  -restart D5W at 100 ml/hr to limit correction to less than +16 mmol/L for 48 hours  -check BMP this PM  -check PTHrP (though not hypercalcemic) given suspicion of malignancy  -trend BMP    2-hypokalemia  -possibly d/t poor oral intake  -K 3.4 improving gradually w/supplement  -adequate mg  -supplement K but be gentle as potassium supplementation facilitates sodium exchange and raises Na level  -trend BMP    3-acute metabolic acidosis  -possibly in setting of starvation, though no anion gap  -UA did have trace ketones check beta-hydroxybutyrate  -CO2 20>19; avoid supplementation at this time    4-acute on chronic anemia, unspecified  -drop from 10.8 g/dL on 6/17>7.8 on 6/30> 6.6 g/dL on arrival s/p transfusion  -renal function normal doubt renal contribution  -Hb today 7.8 g/dL  -workup per primary/GI          Frankie Hill MD  2:39 PM  7/10/2025

## 2025-07-10 NOTE — ACP (ADVANCE CARE PLANNING)
Advance Care Planning         The patient has appointed the following active healthcare agents:    Primary Decision Maker: Masha Magana - Child - [phone]

## 2025-07-10 NOTE — PROGRESS NOTES
PROGRESS NOTE  By Morgan Dc M.D.    [facility]  Dr. Moustapha Fay M.D.,  Dr. Sage Silva M.D.,   Dr. Brian Zendejas D.O.,  Dr. Shlomo Joya M.D.,  TEREZA SegalO.,          Marie Meehan  75 y.o.  female    SUBJECTIVE:  No new complaints including no abdominal pain.  No family at bedside    OBJECTIVE:    /60   Pulse 69   Temp 98.4 °F (36.9 °C) (Oral)   Resp 20   Ht 1.676 m (5' 6\")   Wt 66.5 kg (146 lb 8 oz)   SpO2 95%   BMI 23.65 kg/m²     General: NAD/adult  female.  AAO x 3  HEENT: Anicteric sclera/moist oral mucosa  Neck: Supple/trachea midline  Chest: Trach excursion/nonlabored respirations  Cor: Regular  Abd.: Soft.  Nontender and nondistended  Extr.:  No significant peripheral edema.  Decreased muscle tone and bulk throughout  Skin: Warm and dry /anicteric        DATA:    CTA scan reviewed images/report read:  Lower Chest: Mild scarring within the lung bases.  May represent sequela  prior pneumonia.  Heart is normal in size.  Coronary artery calcifications.     Liver: Several hepatic cysts.  A hypoenhancing lesion within the medial  aspect of the right hepatic dome measuring 1.7 x 1.2 cm superior to the  hepatic veins, demonstrates delayed nodular enhancement, most likely a  hemangioma.     Gallbladder and biliary system: Unremarkable.     Spleen: Unremarkable.     Pancreas: Unremarkable.     Adrenal glands: Unremarkable.     : Small bilateral renal cortical cysts.  A 7 mm nonobstructive calculus at  the lower pole of the left kidney.  No hydronephrosis.  Unremarkable urinary  bladder. Postsurgical change of hysterectomy.     GI/Bowel: Visualized lower esophagus is unremarkable. The stomach is  unremarkable. Small bowel is unremarkable. Extensive severe sigmoid colonic  diverticulosis without acute inflammation.  Large amount of fecal burden  within the rectum.  Unremarkable appendix.     Aorta and major vessels: Severe atherosclerotic calcification

## 2025-07-10 NOTE — CONSULTS
Department of Medicine  Division of Hematology/Oncology  Attending Consult Note      Reason for Consult:  Metastatic bone lesions, new. Known breast ca follows [facility]  Requesting Physician:  Dr. Kyung Tidwell    CHIEF COMPLAINT:  Weakness    History Obtained From:  Patient and EMR    HISTORY OF PRESENT ILLNESS:      The patient is a 75 y.o. female with significant past medical history of [place]'s disease, hypothyroid, GERD, breast cancer s/p left lumpectomy in 2023-estrogen +, s/p radiation, refused adjuvant endocrine treatment and follows at [facility] who presents with progressive weakness x1 month.   On arrival Hgb 6.6, 1 unit PRBC given.   CTA abd/pelvis reveals metastatic osseous lesions, new from prior exam. Otherwise no acute process. No GI bleed. A 7 mm nonobstructive calculus at the lower pole of the left kidney. No hydronephrosis.  She denies any GI or  blood loss or any easy bruising.  Denies any headaches, blurry vision, dizziness, lightheadedness.  Denies any chest pain, palpitations, shortness of breath, dyspnea with exertion.  Denies any abdominal pain, nausea, vomiting, diarrhea or constipation.  Denies any lymph node swellings, lumps, bumps, fevers, chills, night sweats or unintentional weight loss. Her only complaint has been progressive weakness.    Past Medical History:        Diagnosis Date    Breast cancer (HCC)     Cancer (HCC) 06/26/2023    Left breast IDC, ER positive.    Colon cancer screening     GERD (gastroesophageal reflux disease)     [place] disease (HCC)     Thyroid disease        Past Surgical History:        Procedure Laterality Date    APPENDECTOMY      COLONOSCOPY      COLONOSCOPY N/A 5/28/2021    COLONOSCOPY DIAGNOSTIC performed by Yuridia Taveras MD at [facility] ENDOSCOPY    HERNIA REPAIR      HYSTERECTOMY (CERVIX STATUS UNKNOWN)      THYROID SURGERY      UPPER GASTROINTESTINAL ENDOSCOPY N/A 5/28/2021    EGD BIOPSY performed by Yuridia Taveras MD at [facility] ENDOSCOPY    US BREAST BIOPSY

## 2025-07-10 NOTE — PROGRESS NOTES
[facility] Hospitalist Progress Note    Admitting Date and Time: 7/9/2025  4:28 AM  Admit Dx: Acute hyponatremia [E87.1]  Hypokalemia [E87.6]  Hyponatremia [E87.1]  Generalized weakness [R53.1]  Symptomatic anemia [D64.9]      Assessment:    Principal Problem:    Hyponatremia  Active Problems:    Postoperative hypothyroidism    GERD (gastroesophageal reflux disease)    HTN (hypertension)    Yosi's disease (HCC)    Acquired hypothyroidism  Resolved Problems:    * No resolved hospital problems. *      Plan:  7/9/2025  Acute anemia  - 1 unit of packed red blood cells ordered in the ED  - Trend hemoglobin and hematocrit  - Check stool for occult blood     Hyponatremia  Hypokalemia  - Fluid restriction  - Nephrology consult  - Trend electrolytes     [place]'s disease  - Chronic disease, stable     Hypothyroid  - Continue home Synthroid    7/10/2025  -- Consult hematology oncology for new metastatic lesions, possible breast cancer as patient has known history of breast cancer but has not followed up with her oncologist for some time, she has had some weight loss recently and during her last admission was hypercalcemic and left AGAINST MEDICAL ADVICE  --Hemoglobin is 7.3 this morning, stable; she received 1 unit packed red blood cells yesterday  --Awaiting stool guaiac, gastroenterology is following  --PPI therapy  --The patient is adamant that she does not want any inpatient endoscopies  --Her sodium is correcting, managed by nephrology  --Replace magnesium  -- replace potassium at the discretion of nephrology    DVT prophylaxis: pcd's  CODE STATUS: full    Continue at home medications as previously prescribed for the management of chronic conditions during this admission if and where appropriate.    Subjective:  Patient is being followed for Acute hyponatremia [E87.1]  Hypokalemia [E87.6]  Hyponatremia [E87.1]  Generalized weakness [R53.1]  Symptomatic anemia [D64.9]   The patient is seen at bedside

## 2025-07-10 NOTE — PROGRESS NOTES
Spiritual Health History and Assessment/Progress Note  [facility] [address] [facility]    (P) Spiritual/Emotional Needs,  ,  ,      Name: Marie Meehan MRN: [id number]    Age: 75 y.o.     Sex: female   Language: English   Islam: Worship   Hyponatremia     Date: 7/10/2025                           Spiritual Assessment began in [facility] MED SURG TELE        Referral/Consult From: (P) Rounding   Encounter Overview/Reason: (P) Spiritual/Emotional Needs  Service Provided For: (P) Patient    Mari, Belief, Meaning:   Patient identifies as spiritual, is connected with a mari tradition or spiritual practice, and has beliefs or practices that help with coping during difficult times  Family/Friends No family/friends present      Importance and Influence:  Patient has spiritual/personal beliefs that influence decisions regarding their health  Family/Friends No family/friends present    Community:  Patient feels well-supported. Support system includes: Children and [place] Community  Family/Friends No family/friends present    Assessment and Plan of Care:     Patient Interventions include: Facilitated expression of thoughts and feelings, Explored spiritual coping/struggle/distress, and Affirmed coping skills/support systems  Family/Friends Interventions include: No family/friends present    Patient Plan of Care: Spiritual Care available upon further referral  Family/Friends Plan of Care: No family/friends present    Electronically signed by ERIC Chang on 7/10/2025 at 10:13 AM

## 2025-07-10 NOTE — CARE COORDINATION
Ss note:7/10/2025 1:25 PM Met with pt, pleasant and soft spoken. Resides home alone, 2 story, first floor set up. PTA is independent, on room air, drives, no hx of HHC/SNF/DME. Pt has 3 children locally for support. PCP is Martina Trevino, nephrology consulted, GI consulted, hematology/oncology consulted. Plan is home dtr to transport, no care coordination needs relayed at this time. NIURKA England

## 2025-07-11 ENCOUNTER — APPOINTMENT (OUTPATIENT)
Dept: NUCLEAR MEDICINE | Age: 75
DRG: 644 | End: 2025-07-11
Payer: MEDICARE

## 2025-07-11 LAB
25(OH)D3 SERPL-MCNC: 82.9 NG/ML (ref 30–100)
ANION GAP SERPL CALCULATED.3IONS-SCNC: 10 MMOL/L (ref 7–16)
ANION GAP SERPL CALCULATED.3IONS-SCNC: 11 MMOL/L (ref 7–16)
ANION GAP SERPL CALCULATED.3IONS-SCNC: 12 MMOL/L (ref 7–16)
ANION GAP SERPL CALCULATED.3IONS-SCNC: 12 MMOL/L (ref 7–16)
BASOPHILS # BLD: 0 K/UL (ref 0–0.2)
BASOPHILS NFR BLD: 0 % (ref 0–2)
BUN SERPL-MCNC: 15 MG/DL (ref 8–23)
BUN SERPL-MCNC: 16 MG/DL (ref 8–23)
BUN SERPL-MCNC: 16 MG/DL (ref 8–23)
BUN SERPL-MCNC: 19 MG/DL (ref 8–23)
CALCIUM SERPL-MCNC: 7.9 MG/DL (ref 8.8–10.2)
CALCIUM SERPL-MCNC: 8.4 MG/DL (ref 8.8–10.2)
CALCIUM SERPL-MCNC: 8.5 MG/DL (ref 8.8–10.2)
CALCIUM SERPL-MCNC: 8.8 MG/DL (ref 8.8–10.2)
CHLORIDE SERPL-SCNC: 101 MMOL/L (ref 98–107)
CHLORIDE SERPL-SCNC: 101 MMOL/L (ref 98–107)
CHLORIDE SERPL-SCNC: 102 MMOL/L (ref 98–107)
CHLORIDE SERPL-SCNC: 90 MMOL/L (ref 98–107)
CO2 SERPL-SCNC: 20 MMOL/L (ref 22–29)
CO2 SERPL-SCNC: 22 MMOL/L (ref 22–29)
CREAT SERPL-MCNC: 0.8 MG/DL (ref 0.5–1)
CREAT SERPL-MCNC: 0.9 MG/DL (ref 0.5–1)
CREAT SERPL-MCNC: 0.9 MG/DL (ref 0.5–1)
CREAT SERPL-MCNC: 1 MG/DL (ref 0.5–1)
EOSINOPHIL # BLD: 0 K/UL (ref 0.05–0.5)
EOSINOPHIL # BLD: 0.16 K/UL (ref 0.05–0.5)
EOSINOPHILS RELATIVE PERCENT: 0 % (ref 0–6)
EOSINOPHILS RELATIVE PERCENT: 2 % (ref 0–6)
ERYTHROCYTE [DISTWIDTH] IN BLOOD BY AUTOMATED COUNT: 20.5 % (ref 11.5–15)
ERYTHROCYTE [DISTWIDTH] IN BLOOD BY AUTOMATED COUNT: 20.9 % (ref 11.5–15)
ERYTHROCYTE [DISTWIDTH] IN BLOOD BY AUTOMATED COUNT: 20.9 % (ref 11.5–15)
ERYTHROCYTE [DISTWIDTH] IN BLOOD BY AUTOMATED COUNT: 21 % (ref 11.5–15)
GFR, ESTIMATED: 60 ML/MIN/1.73M2
GFR, ESTIMATED: 65 ML/MIN/1.73M2
GFR, ESTIMATED: 66 ML/MIN/1.73M2
GFR, ESTIMATED: 72 ML/MIN/1.73M2
GLUCOSE BLD-MCNC: 158 MG/DL (ref 74–99)
GLUCOSE SERPL-MCNC: 107 MG/DL (ref 74–99)
GLUCOSE SERPL-MCNC: 125 MG/DL (ref 74–99)
GLUCOSE SERPL-MCNC: 143 MG/DL (ref 74–99)
GLUCOSE SERPL-MCNC: 589 MG/DL (ref 74–99)
HCT VFR BLD AUTO: 20.1 % (ref 34–48)
HCT VFR BLD AUTO: 20.7 % (ref 34–48)
HCT VFR BLD AUTO: 20.9 % (ref 34–48)
HCT VFR BLD AUTO: 21.6 % (ref 34–48)
HCT VFR BLD AUTO: 21.7 % (ref 34–48)
HGB BLD-MCNC: 6.8 G/DL (ref 11.5–15.5)
HGB BLD-MCNC: 7.1 G/DL (ref 11.5–15.5)
HGB BLD-MCNC: 7.2 G/DL (ref 11.5–15.5)
HGB BLD-MCNC: 7.3 G/DL (ref 11.5–15.5)
HGB BLD-MCNC: 7.3 G/DL (ref 11.5–15.5)
LYMPHOCYTES NFR BLD: 1.6 K/UL (ref 1.5–4)
LYMPHOCYTES NFR BLD: 1.84 K/UL (ref 1.5–4)
LYMPHOCYTES NFR BLD: 2.17 K/UL (ref 1.5–4)
LYMPHOCYTES NFR BLD: 2.98 K/UL (ref 1.5–4)
LYMPHOCYTES RELATIVE PERCENT: 18 % (ref 20–42)
LYMPHOCYTES RELATIVE PERCENT: 23 % (ref 20–42)
LYMPHOCYTES RELATIVE PERCENT: 29 % (ref 20–42)
LYMPHOCYTES RELATIVE PERCENT: 35 % (ref 20–42)
MAGNESIUM SERPL-MCNC: 1.8 MG/DL (ref 1.6–2.4)
MAGNESIUM SERPL-MCNC: 1.9 MG/DL (ref 1.6–2.4)
MCH RBC QN AUTO: 30 PG (ref 26–35)
MCH RBC QN AUTO: 30.4 PG (ref 26–35)
MCH RBC QN AUTO: 30.5 PG (ref 26–35)
MCH RBC QN AUTO: 30.7 PG (ref 26–35)
MCHC RBC AUTO-ENTMCNC: 33.6 G/DL (ref 32–34.5)
MCHC RBC AUTO-ENTMCNC: 33.8 G/DL (ref 32–34.5)
MCHC RBC AUTO-ENTMCNC: 33.8 G/DL (ref 32–34.5)
MCHC RBC AUTO-ENTMCNC: 34.3 G/DL (ref 32–34.5)
MCV RBC AUTO: 89.3 FL (ref 80–99.9)
MCV RBC AUTO: 89.6 FL (ref 80–99.9)
MCV RBC AUTO: 90 FL (ref 80–99.9)
MCV RBC AUTO: 90.1 FL (ref 80–99.9)
METAMYELOCYTES ABSOLUTE COUNT: 0.08 K/UL (ref 0–0.12)
METAMYELOCYTES ABSOLUTE COUNT: 0.15 K/UL (ref 0–0.12)
METAMYELOCYTES ABSOLUTE COUNT: 0.2 K/UL (ref 0–0.12)
METAMYELOCYTES: 1 % (ref 0–1)
METAMYELOCYTES: 2 % (ref 0–1)
METAMYELOCYTES: 3 % (ref 0–1)
MONOCYTES NFR BLD: 0.08 K/UL (ref 0.1–0.95)
MONOCYTES NFR BLD: 0.3 K/UL (ref 0.1–0.95)
MONOCYTES NFR BLD: 0.34 K/UL (ref 0.1–0.95)
MONOCYTES NFR BLD: 0.56 K/UL (ref 0.1–0.95)
MONOCYTES NFR BLD: 1 % (ref 2–12)
MONOCYTES NFR BLD: 4 % (ref 2–12)
MONOCYTES NFR BLD: 4 % (ref 2–12)
MONOCYTES NFR BLD: 6 % (ref 2–12)
MYELOCYTES ABSOLUTE COUNT: 0.07 K/UL
MYELOCYTES ABSOLUTE COUNT: 0.07 K/UL
MYELOCYTES ABSOLUTE COUNT: 0.32 K/UL
MYELOCYTES: 1 %
MYELOCYTES: 1 %
MYELOCYTES: 4 %
NEUTROPHILS NFR BLD: 59 % (ref 43–80)
NEUTROPHILS NFR BLD: 63 % (ref 43–80)
NEUTROPHILS NFR BLD: 69 % (ref 43–80)
NEUTROPHILS NFR BLD: 76 % (ref 43–80)
NEUTS SEG NFR BLD: 4.82 K/UL (ref 1.8–7.3)
NEUTS SEG NFR BLD: 5 K/UL (ref 1.8–7.3)
NEUTS SEG NFR BLD: 5.99 K/UL (ref 1.8–7.3)
NEUTS SEG NFR BLD: 6.08 K/UL (ref 1.8–7.3)
NUCLEATED RED BLOOD CELLS: 13 PER 100 WBC
NUCLEATED RED BLOOD CELLS: 15 PER 100 WBC
NUCLEATED RED BLOOD CELLS: 15 PER 100 WBC
NUCLEATED RED BLOOD CELLS: 9 PER 100 WBC
PLATELET # BLD AUTO: 67 K/UL (ref 130–450)
PLATELET # BLD AUTO: 69 K/UL (ref 130–450)
PLATELET # BLD AUTO: 79 K/UL (ref 130–450)
PLATELET CONFIRMATION: NORMAL
PLATELET, FLUORESCENCE: 66 K/UL (ref 130–450)
PMV BLD AUTO: 11.7 FL (ref 7–12)
PMV BLD AUTO: 12 FL (ref 7–12)
PMV BLD AUTO: 12 FL (ref 7–12)
PMV BLD AUTO: 12.1 FL (ref 7–12)
POTASSIUM SERPL-SCNC: 2.9 MMOL/L (ref 3.5–5.1)
POTASSIUM SERPL-SCNC: 3.3 MMOL/L (ref 3.5–5.1)
POTASSIUM SERPL-SCNC: 3.8 MMOL/L (ref 3.5–5.1)
POTASSIUM SERPL-SCNC: 4 MMOL/L (ref 3.5–5.1)
PTH-INTACT SERPL-MCNC: 14 PG/ML (ref 15–65)
RBC # BLD AUTO: 2.23 M/UL (ref 3.5–5.5)
RBC # BLD AUTO: 2.31 M/UL (ref 3.5–5.5)
RBC # BLD AUTO: 2.4 M/UL (ref 3.5–5.5)
RBC # BLD AUTO: 2.43 M/UL (ref 3.5–5.5)
RBC # BLD: ABNORMAL 10*6/UL
SODIUM SERPL-SCNC: 121 MMOL/L (ref 136–145)
SODIUM SERPL-SCNC: 134 MMOL/L (ref 136–145)
SODIUM SERPL-SCNC: 134 MMOL/L (ref 136–145)
SODIUM SERPL-SCNC: 135 MMOL/L (ref 136–145)
WBC OTHER # BLD: 7.6 K/UL (ref 4.5–11.5)
WBC OTHER # BLD: 8 K/UL (ref 4.5–11.5)
WBC OTHER # BLD: 8.5 K/UL (ref 4.5–11.5)
WBC OTHER # BLD: 8.7 K/UL (ref 4.5–11.5)

## 2025-07-11 PROCEDURE — 82306 VITAMIN D 25 HYDROXY: CPT

## 2025-07-11 PROCEDURE — 3430000000 HC RX DIAGNOSTIC RADIOPHARMACEUTICAL: Performed by: RADIOLOGY

## 2025-07-11 PROCEDURE — 6370000000 HC RX 637 (ALT 250 FOR IP): Performed by: FAMILY MEDICINE

## 2025-07-11 PROCEDURE — 2580000003 HC RX 258: Performed by: STUDENT IN AN ORGANIZED HEALTH CARE EDUCATION/TRAINING PROGRAM

## 2025-07-11 PROCEDURE — 1200000000 HC SEMI PRIVATE

## 2025-07-11 PROCEDURE — 36415 COLL VENOUS BLD VENIPUNCTURE: CPT

## 2025-07-11 PROCEDURE — 85014 HEMATOCRIT: CPT

## 2025-07-11 PROCEDURE — 82962 GLUCOSE BLOOD TEST: CPT

## 2025-07-11 PROCEDURE — 80048 BASIC METABOLIC PNL TOTAL CA: CPT

## 2025-07-11 PROCEDURE — 83735 ASSAY OF MAGNESIUM: CPT

## 2025-07-11 PROCEDURE — 2500000003 HC RX 250 WO HCPCS: Performed by: HOSPITALIST

## 2025-07-11 PROCEDURE — 83970 ASSAY OF PARATHORMONE: CPT

## 2025-07-11 PROCEDURE — 6370000000 HC RX 637 (ALT 250 FOR IP)

## 2025-07-11 PROCEDURE — 99232 SBSQ HOSP IP/OBS MODERATE 35: CPT | Performed by: FAMILY MEDICINE

## 2025-07-11 PROCEDURE — 6360000002 HC RX W HCPCS: Performed by: HOSPITALIST

## 2025-07-11 PROCEDURE — 85018 HEMOGLOBIN: CPT

## 2025-07-11 PROCEDURE — 2500000003 HC RX 250 WO HCPCS: Performed by: FAMILY MEDICINE

## 2025-07-11 PROCEDURE — 6370000000 HC RX 637 (ALT 250 FOR IP): Performed by: INTERNAL MEDICINE

## 2025-07-11 PROCEDURE — A9503 TC99M MEDRONATE: HCPCS | Performed by: RADIOLOGY

## 2025-07-11 PROCEDURE — 85025 COMPLETE CBC W/AUTO DIFF WBC: CPT

## 2025-07-11 RX ORDER — SODIUM CHLORIDE 9 MG/ML
INJECTION, SOLUTION INTRAVENOUS PRN
Status: DISCONTINUED | OUTPATIENT
Start: 2025-07-11 | End: 2025-07-12 | Stop reason: HOSPADM

## 2025-07-11 RX ORDER — TC 99M MEDRONATE 20 MG/10ML
25 INJECTION, POWDER, LYOPHILIZED, FOR SOLUTION INTRAVENOUS
Status: COMPLETED | OUTPATIENT
Start: 2025-07-11 | End: 2025-07-11

## 2025-07-11 RX ORDER — POTASSIUM CHLORIDE 1500 MG/1
40 TABLET, EXTENDED RELEASE ORAL ONCE
Status: COMPLETED | OUTPATIENT
Start: 2025-07-11 | End: 2025-07-11

## 2025-07-11 RX ADMIN — TC 99M MEDRONATE 25 MILLICURIE: 20 INJECTION, POWDER, LYOPHILIZED, FOR SOLUTION INTRAVENOUS at 10:39

## 2025-07-11 RX ADMIN — POTASSIUM CHLORIDE 40 MEQ: 1500 TABLET, EXTENDED RELEASE ORAL at 09:05

## 2025-07-11 RX ADMIN — LEVOTHYROXINE SODIUM 125 MCG: 0.12 TABLET ORAL at 05:59

## 2025-07-11 RX ADMIN — Medication 10 ML: at 08:47

## 2025-07-11 RX ADMIN — SODIUM CHLORIDE, PRESERVATIVE FREE 40 MG: 5 INJECTION INTRAVENOUS at 21:18

## 2025-07-11 RX ADMIN — DEXTROSE: 5 SOLUTION INTRAVENOUS at 02:33

## 2025-07-11 RX ADMIN — ACETAMINOPHEN 650 MG: 325 TABLET ORAL at 00:52

## 2025-07-11 RX ADMIN — PYRIDOXINE HCL TAB 50 MG 100 MG: 50 TAB at 08:46

## 2025-07-11 RX ADMIN — Medication 10 ML: at 20:33

## 2025-07-11 RX ADMIN — ACETAMINOPHEN 650 MG: 325 TABLET ORAL at 20:33

## 2025-07-11 RX ADMIN — Medication 400 UNITS: at 08:47

## 2025-07-11 RX ADMIN — ANASTROZOLE 1 MG: 1 TABLET ORAL at 08:46

## 2025-07-11 RX ADMIN — SODIUM CHLORIDE, PRESERVATIVE FREE 40 MG: 5 INJECTION INTRAVENOUS at 08:47

## 2025-07-11 RX ADMIN — CYANOCOBALAMIN TAB 1000 MCG 250 MCG: 1000 TAB at 08:47

## 2025-07-11 RX ADMIN — Medication 2000 UNITS: at 08:46

## 2025-07-11 RX ADMIN — Medication 6 MG: at 21:44

## 2025-07-11 ASSESSMENT — PAIN DESCRIPTION - DESCRIPTORS
DESCRIPTORS: DISCOMFORT;OTHER (COMMENT)
DESCRIPTORS: DISCOMFORT

## 2025-07-11 ASSESSMENT — PAIN SCALES - GENERAL
PAINLEVEL_OUTOF10: 2
PAINLEVEL_OUTOF10: 5
PAINLEVEL_OUTOF10: 5

## 2025-07-11 ASSESSMENT — PAIN DESCRIPTION - LOCATION
LOCATION: OTHER (COMMENT)
LOCATION: OTHER (COMMENT)

## 2025-07-11 ASSESSMENT — PAIN SCALES - WONG BAKER: WONGBAKER_NUMERICALRESPONSE: HURTS A LITTLE BIT

## 2025-07-11 NOTE — PROGRESS NOTES
PROGRESS NOTE  By Morgan Dc M.D.    [facility]  Dr. Moustapha Fay M.D.,  Dr. Sage Silva M.D.,   Dr. Brian Zendejas D.O.,  Dr. Shlomo Joya M.D.,  TEREZA SegalO.,          Marie Meehan  75 y.o.  female    SUBJECTIVE:  Denies abdominal pain.  Denies nausea or vomiting    OBJECTIVE:    BP (!) 153/78   Pulse 89   Temp 97.3 °F (36.3 °C) (Oral)   Resp 18   Ht 1.676 m (5' 6\")   Wt 66.5 kg (146 lb 8 oz)   SpO2 96%   BMI 23.65 kg/m²     General: NAD/adult  female.  AAO x 3  HEENT: Sclera/moist oral mucosa  Neck: Supple with trachea midline  Chest: Metric excursion/nonlabored respirations  Cor: Regular/S1-S2  Abd.: Soft.  Nontender.  Nondistended.  BS +/4  Extr.:  No peripheral edema.  Decreased muscle tone and bulk throughout, consistent with age and condition.  Skin: Warm and dry/anicteric        DATA:    Monitor data reviewed -sinus rhythm noted.       Lab Results   Component Value Date/Time    WBC 7.6 07/11/2025 05:37 AM    RBC 2.23 07/11/2025 05:37 AM    HGB 6.8 07/11/2025 05:37 AM    HCT 20.1 07/11/2025 05:37 AM    MCV 90.1 07/11/2025 05:37 AM    MCH 30.5 07/11/2025 05:37 AM    MCHC 33.8 07/11/2025 05:37 AM    RDW 20.9 07/11/2025 05:37 AM    PLT 69 07/11/2025 05:37 AM    MPV 12.1 07/11/2025 05:37 AM     Lab Results   Component Value Date/Time     07/11/2025 06:47 AM    K 3.3 07/11/2025 06:47 AM     07/11/2025 06:47 AM    CO2 22 07/11/2025 06:47 AM    BUN 16 07/11/2025 06:47 AM    CREATININE 0.9 07/11/2025 06:47 AM    CALCIUM 8.5 07/11/2025 06:47 AM    BILITOT 1.3 07/09/2025 04:45 AM    ALKPHOS 171 07/09/2025 04:45 AM    AST 93 07/09/2025 04:45 AM    ALT 40 07/09/2025 04:45 AM     No results found for: \"LIPASE\"  No results found for: \"AMYLASE\"      ASSESSMENT/PLAN:  Patient Active Problem List   Diagnosis    Malignant neoplasm of upper-inner quadrant of left breast in female, estrogen receptor positive (HCC)    Hearing problem of both ears    Vitamin D  deficiency    Postoperative hypothyroidism    GERD (gastroesophageal reflux disease)    HTN (hypertension)    Yosi's disease (HCC)    Acquired hypothyroidism    SVT (supraventricular tachycardia)    Hypercalcemia    Hyperthyroidism-- exogenous    Wheeler chorea (HCC)    Hyponatremia    Hypokalemia    Symptomatic anemia    Malignant neoplasm metastatic to bone (HCC)    Acute hyponatremia    Generalized weakness     1.  Anemia  - Acute on chronic  - No evidence of bleed  -normocytic  - Obtain iron studies before blood transfusion  - Pending FOBT  - No significant elevation in INR   - CTA negative for GI bleed  - Monitor H&H; defer transfusion to admitting  - Elevate head of bed; oxygen nasal cannula  - Continue twice a day IV PPI  - Further evaluation with upper endoscopy followed by colonoscopy inpatient versus outpatient pending clinical course/H&H dynamics and resolution of hyponatremia -see discussion below     2.  Hepatitis/transaminitis   - INR as above   - Negative acute viral hepatitis serology  -Ultrasound of the liver with normal morphology at the 15.5 cm small simple cysts and 1 hypoechoic lesion likely hemangioma  -Doppler study is unremarkable  - Monitor labs      3.  Thrombocytopenia  - New/worsening  - Recommend hematology evaluation      4.  Hyponatremia  -Severe  -Persistent  - Per admitting/pertinent consultants     5.  Bone lesions  - Likely metastatic disease  - Hematology oncology consultation as above     6.  Comorbidities -Wheeler's disease, etc.     Persistent decrease in H&H without evidence of overt bleed -possibly related to hematological issue.  Will reorder FOBT.  As previously stated I have discussed concern for gastrointestinal source of blood loss and need for further evaluation with endoscopies (unremarkable EGD and colonoscopy 2021 by Dr. Laci Taveras) - patient verbalized understanding but she is declining inpatient endoscopy - wants to proceed with outpatient

## 2025-07-11 NOTE — PROGRESS NOTES
[facility] Hospitalist Progress Note    Admitting Date and Time: 7/9/2025  4:28 AM  Admit Dx: Acute hyponatremia [E87.1]  Hypokalemia [E87.6]  Hyponatremia [E87.1]  Generalized weakness [R53.1]  Symptomatic anemia [D64.9]    Subjective:  Patient is being followed for Acute hyponatremia [E87.1]  Hypokalemia [E87.6]  Hyponatremia [E87.1]  Generalized weakness [R53.1]  Symptomatic anemia [D64.9]     Pt feels OK  Denies bone pain  No fevers  No events overnight  No cough/wheezing/SOB  Throat is dry    Per RN: nothing to report    ROS: denies fever, chills, cp, sob, n/v, HA unless stated above.      anastrozole  1 mg Oral Daily    sodium chloride flush  5-40 mL IntraVENous 2 times per day    levothyroxine  125 mcg Oral Daily    vitamin B-6  100 mg Oral Daily    vitamin B-12  250 mcg Oral Daily    Vitamin D  2,000 Units Oral Daily    vitamin E  400 Units Oral Daily    pantoprazole (PROTONIX) 40 mg in sodium chloride (PF) 0.9 % 10 mL injection  40 mg IntraVENous Q12H     sodium chloride, , PRN  sodium chloride, , PRN  sodium chloride flush, 5-40 mL, PRN  acetaminophen, 650 mg, Q6H PRN   Or  acetaminophen, 650 mg, Q6H PRN         Objective:    BP (!) 153/78   Pulse 89   Temp 97.3 °F (36.3 °C) (Oral)   Resp 18   Ht 1.676 m (5' 6\")   Wt 66.5 kg (146 lb 8 oz)   SpO2 96%   BMI 23.65 kg/m²     General Appearance: alert and oriented to person, place and time and in no acute distress  Skin: warm and dry, good turgor, no rashes, no jaundice  Eyes: conjunctivae normal  Mouth: clear, moist, no thrush  Neck: No JVD  Pulmonary/Chest: no wheezes, rales or rhonchi, normal air movement, no respiratory distress  Cardiovascular: normal rate, normal S1 and S2  Abdomen: soft, non-tender, non-distended, normal bowel sounds  Extremities: no cyanosis, no clubbing and no edema, no venous stasis dermatitis  Neurologic: no cranial nerve deficit and speech normal, conversational, mental status baseline, repeated choreic movements  noted during my exam and questioning        Recent Labs     07/10/25  2238 07/11/25  0537 07/11/25  0647   * 121* 135*   K 3.5 2.9* 3.3*    90* 102   CO2 21* 20* 22   BUN 20 15 16   CREATININE 1.1* 0.8 0.9   GLUCOSE 147* 589* 125*   CALCIUM 8.4* 7.9* 8.5*       Recent Labs     07/10/25  1759 07/10/25  2238 07/11/25  0537 07/11/25  1023 07/11/25  1406   WBC 8.2 8.0 7.6  --  8.7   RBC 2.56* 2.43* 2.23*  --  2.40*   HGB 7.8* 7.3* 6.8* 7.2* 7.3*   HCT 22.9* 21.7* 20.1* 20.9* 21.6*   MCV 89.5 89.3 90.1  --  90.0   MCH 30.5 30.0 30.5  --  30.4   MCHC 34.1 33.6 33.8  --  33.8   RDW 20.6* 20.5* 20.9*  --  21.0*   PLT 83* 79* 69*  --   --    MPV 11.3 12.0 12.1*  --  12.0       Radiology:   None new    Bone scan -- pending    Assessment:    Principal Problem:    Hyponatremia  Active Problems:    Postoperative hypothyroidism    GERD (gastroesophageal reflux disease)    HTN (hypertension)    Yosi's disease (HCC)    Acquired hypothyroidism    Hypokalemia    Symptomatic anemia    Malignant neoplasm metastatic to bone (HCC)    Acute hyponatremia    Generalized weakness  Resolved Problems:    * No resolved hospital problems. *      Plan:  1.  Hyponatremia -- resolved; likely SIADH from metastatic breast CA  -Nephrology on board  -Continue fluid restriction for home  -Follow up outpatient    2.  Hypokalemia -- without hypomagnesemia  -Trend and replace as needed  -Consider daily replacement for discharge    3.  New bony lesions -- consistent with metastatic disease; likely due to breast cancer -- Stage IB ER+/AK+ s/p lumpectomy at [facility] in 6/2023, s/p radiation, completed, patient refused estrogen blockade  -Heme-oncology on board  -Anastrozole started  -Await bone scan    4.  [place]'s chorea  -Stable    Disp: to home without services -- likely tomorrow  Code status: TOTAL support  DVT: Ambulation    NOTE: This report was transcribed using voice recognition software. Every effort was made to ensure accuracy;

## 2025-07-11 NOTE — PLAN OF CARE
Problem: Skin/Tissue Integrity  Goal: Skin integrity remains intact  Description: 1.  Monitor for areas of redness and/or skin breakdown  2.  Assess vascular access sites hourly  3.  Every 4-6 hours minimum:  Change oxygen saturation probe site  4.  Every 4-6 hours:  If on nasal continuous positive airway pressure, respiratory therapy assess nares and determine need for appliance change or resting period  7/11/2025 1630 by Jenn Lozano RN  Outcome: Progressing  7/11/2025 0406 by Ana Sawyer LPN  Outcome: Progressing     Problem: Safety - Adult  Goal: Free from fall injury  7/11/2025 1630 by Jenn Lozano RN  Outcome: Progressing  7/11/2025 0406 by Ana Sawyer LPN  Outcome: Progressing

## 2025-07-11 NOTE — PROGRESS NOTES
Nephrology Progress Note  Patient's Name: Marie Meehan  3:34 PM  7/11/2025    Summary/HPI:    Marie Meehan is a 75 y.o. female with PMHx breast cancer, GERD, [place]'s disease, hypothyroidism.  She also had recent history of acute kidney injury, baseline serum creatinine generally appears to be in the range of stage II chronic kidney disease with baseline creatinine 0.9 to 1.0 mg/dL.  Nicky 15, 2025 creatinine was 1.6 mg/dL later peaking at 1.8 mg/dL on 6/17 improving to 1.2 mg/dL at discharge on 6/19.  She recently had labs on 6/30 with creatinine 1.3 mg/dL.  She has not seen a nephrologist in the past.    Patient was just admitted at this facility from 6/17 through 6/19 after having signed out of the emergency department on 6/15 AMA when she was admitted with fatigue.  She was treated with IV fluids.  Losartan was held, hydrochlorothiazide was held not restarted, vitamin D supplementation increased.  Low PTH noted.  She has been hypercalcemic on multiple evaluations in the past.    She was advised to restart losartan at her last appointment with her primary care physician on 6/30.  There is a note in the chart from her PCP medical assistant that she had trouble walking.  She presented to the emergency department overnight/early this morning with generalized weakness.  She states she is unable to walk and that her legs do not work.  The patient felt the cause of this was acute renal failure.  However labs showed normal renal function with creatinine 0.9 mg/dL.  However she was severely hyponatremic with a sodium level of 118 mmol/L. She also had profound anemia hemoglobin 6.6 g/dL.  Serum potassium also low at 3.1 mmol/L.  Magnesium has not been checked since presentation.  Initial medical management included the administration of 10 mmol IV potassium chloride, 1 L of normal saline bolus.  Repeat showed Na up to 125 with K 3.5, CO2 19.    Nephrology consult requested for severe

## 2025-07-11 NOTE — PROGRESS NOTES
Subjective:    The patient is awake and alert, sitting up in bed eating lunch. I reviewed CT results with her, provided copies for her records. She denies n/v/d. She is for NM bone scan today.     Objective:    /83   Pulse 99   Temp 99.1 °F (37.3 °C) (Oral)   Resp 18   Ht 1.676 m (5' 6\")   Wt 66.5 kg (146 lb 8 oz)   SpO2 95%   BMI 23.65 kg/m²     General: Alert, oriented, no acute distress  HEENT: No thrush or mucositis, EOMI, PERRLA  Heart:  RRR, no murmurs, gallops, or rubs.  Lungs:  CTA bilaterally, no wheeze, rales or rhonchi  Abd: BS present, nontender, nondistended, no masses  Extrem:  No clubbing, cyanosis, or edema  Lymphatics: No palpable adenopathy in cervical and supraclavicular regions  Skin: Intact, no petechia or purpura  Neuro: Speech normal; choreiform movements     CBC with Differential:    Lab Results   Component Value Date/Time    WBC 7.6 07/11/2025 05:37 AM    RBC 2.23 07/11/2025 05:37 AM    HGB 6.8 07/11/2025 05:37 AM    HCT 20.1 07/11/2025 05:37 AM    PLT 69 07/11/2025 05:37 AM    MCV 90.1 07/11/2025 05:37 AM    MCH 30.5 07/11/2025 05:37 AM    MCHC 33.8 07/11/2025 05:37 AM    RDW 20.9 07/11/2025 05:37 AM    NRBC 9 07/11/2025 05:37 AM    METASPCT 3 07/11/2025 05:37 AM    LYMPHOPCT 29 07/11/2025 05:37 AM    MONOPCT 4 07/11/2025 05:37 AM    MYELOPCT 1 07/11/2025 05:37 AM    EOSPCT 0 07/11/2025 05:37 AM    BASOPCT 0 07/11/2025 05:37 AM    MONOSABS 0.34 07/11/2025 05:37 AM    LYMPHSABS 2.17 07/11/2025 05:37 AM    EOSABS 0.00 07/11/2025 05:37 AM    BASOSABS 0.00 07/11/2025 05:37 AM     CMP:    Lab Results   Component Value Date/Time     07/11/2025 06:47 AM    K 3.3 07/11/2025 06:47 AM     07/11/2025 06:47 AM    CO2 22 07/11/2025 06:47 AM    BUN 16 07/11/2025 06:47 AM    CREATININE 0.9 07/11/2025 06:47 AM    GFRAA >60 09/24/2019 07:36 AM    LABGLOM 65 07/11/2025 06:47 AM    LABGLOM 61 04/17/2024 11:13 AM    GLUCOSE 125 07/11/2025 06:47 AM    CALCIUM 8.5 07/11/2025 06:47 AM     BILITOT 1.3 07/09/2025 04:45 AM    ALKPHOS 171 07/09/2025 04:45 AM    AST 93 07/09/2025 04:45 AM    ALT 40 07/09/2025 04:45 AM            Current Facility-Administered Medications:     0.9 % sodium chloride infusion, , IntraVENous, PRN, Diane Wilkinson, DO    anastrozole (ARIMIDEX) tablet 1 mg, 1 mg, Oral, Daily, Janine Lundberg, APRN - CNP, 1 mg at 07/10/25 1726    0.9 % sodium chloride infusion, , IntraVENous, PRN, Juan Gomez, DO    sodium chloride flush 0.9 % injection 5-40 mL, 5-40 mL, IntraVENous, 2 times per day, Faith Diop DO, 10 mL at 07/10/25 2033    sodium chloride flush 0.9 % injection 5-40 mL, 5-40 mL, IntraVENous, PRN, Faith Diop,     acetaminophen (TYLENOL) tablet 650 mg, 650 mg, Oral, Q6H PRN, 650 mg at 07/11/25 0052 **OR** acetaminophen (TYLENOL) suppository 650 mg, 650 mg, Rectal, Q6H PRN, Faith Diop,     levothyroxine (SYNTHROID) tablet 125 mcg, 125 mcg, Oral, Daily, Faith Diop, , 125 mcg at 07/11/25 0559    vitamin B-6 (PYRIDOXINE) tablet 100 mg, 100 mg, Oral, Daily, Faith Diop DO, 100 mg at 07/10/25 0944    vitamin B-12 (CYANOCOBALAMIN) tablet 250 mcg, 250 mcg, Oral, Daily, Faith Diop DO, 250 mcg at 07/10/25 0944    Vitamin D (CHOLECALCIFEROL) tablet 2,000 Units, 2,000 Units, Oral, Daily, Faith Diop DO, 2,000 Units at 07/10/25 0943    vitamin E capsule 400 Units, 400 Units, Oral, Daily, Faith Diop DO, 400 Units at 07/10/25 0943    pantoprazole (PROTONIX) 40 mg in sodium chloride (PF) 0.9 % 10 mL injection, 40 mg, IntraVENous, Q12H, Morgan Dc MD, 40 mg at 07/10/25 2550    CT CHEST W WO CONTRAST   Final Result   Significant motion artifact degrades evaluation.  Within the limits of   confines:      A 1.2 cm indeterminate right lung base nodule.  Follow-up in 3 months is   recommended.      Extensive osseous metastatic disease.         CTA ABDOMEN PELVIS W CONTRAST   Final Result   Metastatic osseous lesions,

## 2025-07-12 VITALS
BODY MASS INDEX: 23.54 KG/M2 | WEIGHT: 146.5 LBS | TEMPERATURE: 97.9 F | HEART RATE: 91 BPM | HEIGHT: 66 IN | SYSTOLIC BLOOD PRESSURE: 156 MMHG | DIASTOLIC BLOOD PRESSURE: 73 MMHG | RESPIRATION RATE: 20 BRPM | OXYGEN SATURATION: 93 %

## 2025-07-12 PROBLEM — D63.8 ANEMIA OF CHRONIC DISEASE: Status: ACTIVE | Noted: 2025-07-12

## 2025-07-12 PROBLEM — R91.1 RIGHT LOWER LOBE PULMONARY NODULE: Status: ACTIVE | Noted: 2025-07-12

## 2025-07-12 LAB
ANION GAP SERPL CALCULATED.3IONS-SCNC: 11 MMOL/L (ref 7–16)
BASOPHILS # BLD: 0.06 K/UL (ref 0–0.2)
BASOPHILS NFR BLD: 1 % (ref 0–2)
BUN SERPL-MCNC: 18 MG/DL (ref 8–23)
CALCIUM SERPL-MCNC: 8.7 MG/DL (ref 8.8–10.2)
CHLORIDE SERPL-SCNC: 104 MMOL/L (ref 98–107)
CO2 SERPL-SCNC: 22 MMOL/L (ref 22–29)
CREAT SERPL-MCNC: 0.9 MG/DL (ref 0.5–1)
EOSINOPHIL # BLD: 0.25 K/UL (ref 0.05–0.5)
EOSINOPHILS RELATIVE PERCENT: 4 % (ref 0–6)
ERYTHROCYTE [DISTWIDTH] IN BLOOD BY AUTOMATED COUNT: 21.5 % (ref 11.5–15)
FOLATE SERPL-MCNC: 6 NG/ML (ref 4.6–34.8)
FREE KAPPA/LAMBDA RATIO: 1.22 (ref 0.22–1.74)
GFR, ESTIMATED: 63 ML/MIN/1.73M2
GLUCOSE SERPL-MCNC: 105 MG/DL (ref 74–99)
HCT VFR BLD AUTO: 20.4 % (ref 34–48)
HCT VFR BLD AUTO: 25.6 % (ref 34–48)
HGB BLD-MCNC: 6.9 G/DL (ref 11.5–15.5)
HGB BLD-MCNC: 8.6 G/DL (ref 11.5–15.5)
KAPPA LC FREE SER-MCNC: 19.7 MG/L
LAMBDA LC FREE SERPL-MCNC: 16.1 MG/L (ref 4.2–27.7)
LYMPHOCYTES NFR BLD: 1.44 K/UL (ref 1.5–4)
LYMPHOCYTES RELATIVE PERCENT: 21 % (ref 20–42)
MCH RBC QN AUTO: 30.8 PG (ref 26–35)
MCHC RBC AUTO-ENTMCNC: 33.8 G/DL (ref 32–34.5)
MCV RBC AUTO: 91.1 FL (ref 80–99.9)
METAMYELOCYTES ABSOLUTE COUNT: 0.13 K/UL (ref 0–0.12)
METAMYELOCYTES: 2 % (ref 0–1)
MONOCYTES NFR BLD: 0.31 K/UL (ref 0.1–0.95)
MONOCYTES NFR BLD: 5 % (ref 2–12)
MYELOCYTES ABSOLUTE COUNT: 0.13 K/UL
MYELOCYTES: 2 %
NEUTROPHILS NFR BLD: 66 % (ref 43–80)
NEUTS SEG NFR BLD: 4.58 K/UL (ref 1.8–7.3)
NUCLEATED RED BLOOD CELLS: 17 PER 100 WBC
PLATELET CONFIRMATION: NORMAL
PLATELET CONFIRMATION: NORMAL
PLATELET, FLUORESCENCE: 57 K/UL (ref 130–450)
PMV BLD AUTO: ABNORMAL FL (ref 7–12)
POTASSIUM SERPL-SCNC: 3.8 MMOL/L (ref 3.5–5.1)
RBC # BLD AUTO: 2.24 M/UL (ref 3.5–5.5)
RBC # BLD: ABNORMAL 10*6/UL
SODIUM SERPL-SCNC: 138 MMOL/L (ref 136–145)
VIT B12 SERPL-MCNC: 1542 PG/ML (ref 232–1245)
WBC OTHER # BLD: 6.9 K/UL (ref 4.5–11.5)

## 2025-07-12 PROCEDURE — 86334 IMMUNOFIX E-PHORESIS SERUM: CPT

## 2025-07-12 PROCEDURE — 99239 HOSP IP/OBS DSCHRG MGMT >30: CPT | Performed by: FAMILY MEDICINE

## 2025-07-12 PROCEDURE — 6370000000 HC RX 637 (ALT 250 FOR IP)

## 2025-07-12 PROCEDURE — 6370000000 HC RX 637 (ALT 250 FOR IP): Performed by: FAMILY MEDICINE

## 2025-07-12 PROCEDURE — 6370000000 HC RX 637 (ALT 250 FOR IP): Performed by: INTERNAL MEDICINE

## 2025-07-12 PROCEDURE — 80048 BASIC METABOLIC PNL TOTAL CA: CPT

## 2025-07-12 PROCEDURE — 85018 HEMOGLOBIN: CPT

## 2025-07-12 PROCEDURE — 84165 PROTEIN E-PHORESIS SERUM: CPT

## 2025-07-12 PROCEDURE — P9016 RBC LEUKOCYTES REDUCED: HCPCS

## 2025-07-12 PROCEDURE — 84155 ASSAY OF PROTEIN SERUM: CPT

## 2025-07-12 PROCEDURE — 36415 COLL VENOUS BLD VENIPUNCTURE: CPT

## 2025-07-12 PROCEDURE — 86300 IMMUNOASSAY TUMOR CA 15-3: CPT

## 2025-07-12 PROCEDURE — 97161 PT EVAL LOW COMPLEX 20 MIN: CPT

## 2025-07-12 PROCEDURE — 82607 VITAMIN B-12: CPT

## 2025-07-12 PROCEDURE — 2500000003 HC RX 250 WO HCPCS: Performed by: HOSPITALIST

## 2025-07-12 PROCEDURE — 81256 HFE GENE: CPT

## 2025-07-12 PROCEDURE — 2500000003 HC RX 250 WO HCPCS: Performed by: FAMILY MEDICINE

## 2025-07-12 PROCEDURE — 36430 TRANSFUSION BLD/BLD COMPNT: CPT

## 2025-07-12 PROCEDURE — 83521 IG LIGHT CHAINS FREE EACH: CPT

## 2025-07-12 PROCEDURE — 82746 ASSAY OF FOLIC ACID SERUM: CPT

## 2025-07-12 PROCEDURE — 85014 HEMATOCRIT: CPT

## 2025-07-12 PROCEDURE — 6360000002 HC RX W HCPCS: Performed by: HOSPITALIST

## 2025-07-12 PROCEDURE — 85025 COMPLETE CBC W/AUTO DIFF WBC: CPT

## 2025-07-12 RX ORDER — ANASTROZOLE 1 MG/1
1 TABLET ORAL DAILY
Qty: 30 TABLET | Refills: 0 | Status: SHIPPED | OUTPATIENT
Start: 2025-07-13

## 2025-07-12 RX ORDER — SODIUM CHLORIDE 9 MG/ML
INJECTION, SOLUTION INTRAVENOUS PRN
Status: DISCONTINUED | OUTPATIENT
Start: 2025-07-12 | End: 2025-07-12 | Stop reason: HOSPADM

## 2025-07-12 RX ORDER — ACETAMINOPHEN 325 MG/1
650 TABLET ORAL SEE ADMIN INSTRUCTIONS
Status: COMPLETED | OUTPATIENT
Start: 2025-07-12 | End: 2025-07-12

## 2025-07-12 RX ADMIN — Medication 2000 UNITS: at 07:34

## 2025-07-12 RX ADMIN — CYANOCOBALAMIN TAB 1000 MCG 250 MCG: 1000 TAB at 07:34

## 2025-07-12 RX ADMIN — ACETAMINOPHEN 650 MG: 325 TABLET ORAL at 10:23

## 2025-07-12 RX ADMIN — ANASTROZOLE 1 MG: 1 TABLET ORAL at 07:34

## 2025-07-12 RX ADMIN — LEVOTHYROXINE SODIUM 125 MCG: 0.12 TABLET ORAL at 05:58

## 2025-07-12 RX ADMIN — Medication 10 ML: at 07:34

## 2025-07-12 RX ADMIN — SODIUM CHLORIDE, PRESERVATIVE FREE 40 MG: 5 INJECTION INTRAVENOUS at 07:34

## 2025-07-12 RX ADMIN — Medication 400 UNITS: at 07:34

## 2025-07-12 RX ADMIN — PYRIDOXINE HCL TAB 50 MG 100 MG: 50 TAB at 07:34

## 2025-07-12 NOTE — PROGRESS NOTES
S: Patient states she could not do bone scan due to claustrophobia and does not want ativan pre med or re attempt. She denies bleeding. She is eating. She is tolerating anstrazole.    O:  Gen- alert, talking, eating breakfast, jerking movements of arms  HEENT- mmm  Lungs- clear  Cardiac - regular  Abd- soft/nt/nd +BS  Ext- warm and well perfused    Labs:  Wbc=6.9 Hb=6.9  plt=57 with 66% segs ,s ome nucleated RBCs, occasional myelocyte and rare blast  Creatinine 0.9    A/P:  75 y.o. female with significant past medical history of Gordon's disease,  and resected breast cancer s/p left lumpectomy in 2023-estrogen +, s/p radiation, refused adjuvant endocrine treatment and follows at [facility] oncology who presents with progressive weakness x1 month and new bony lesions consistent with mets..  She has anemia and thrombocytopenia with erythroleukoblastic picture on peripheral smear which is most likely related to marrow involvement by her breast cancer. She states she could not do bone scan due to claustrophobia and does not want re-attempt with ativan. She is tolerating anastrozole started in the hospital for breast cancer treatment.   I discussed what is likely causing her anemia and thrombocytopenia and we discussed future bone marrow biopsy to confirm my suspicions and she is agreeable.         CTA abd/pelvis reveals metastatic osseous lesions, new from prior exam. Otherwise no acute process. No GI bleed. A 7 mm nonobstructive calculus at the lower pole of the left kidney. No hydronephrosis.      Continue daily anastrozole 1mg started 2 days ago  Recommend bone marrow biopsy to see if has diffuse marrow involvement as etiology of anemia and thrombocytopenia. She is agreeable. This can be done inpatient or outpatient. ALso check B12, folate, SPEP today. Iron studies are high.  Transfuse 1 unit of RBC today, nurse verified patient agrees to transfusion  4. CT chest reveals a 1.2 cm indeterminate right lung base  nodule.  Follow-up in 3 months is recommended. Extensive osseous metastatic disease  5. GI following re: poss GI bleed. Occult stool ordered. Patient does not want any endoscopies while inpatient.      - Patient previously following with [facility] for her previous breast cancer treatment. She has decided she wants to follow up with [facility] going forward. Our office will reach out to her to schedule a follow up in our office once discharge plans are in place.     Yeni Simpson MD

## 2025-07-12 NOTE — PROGRESS NOTES
of liver. 2 small simple hepatic cysts.  A well circumscribed hypoechoic lesion within the right hepatic dome abutting the hepatic vein measuring 1.6 x 1.3 x 1.6 cm, probably a hemangioma.  Patent portal vein with normal hepatopetal flow.  No intrahepatic biliary ductal dilatation. Gallbladder: Unremarkable gallbladder. CBD: CBD measuring 4 mm in diameter, within normal limits. Visualized pancreas: Unremarkable.     Likely hemangioma within the right hepatic dome.  A CT of the abdomen pelvis has been performed.     XR CHEST PORTABLE  Result Date: 7/9/2025  EXAMINATION: ONE XRAY VIEW OF THE CHEST 7/9/2025 6:13 am COMPARISON: Chest x-ray dated 06/15/2025 HISTORY: ORDERING SYSTEM PROVIDED HISTORY: generalized weakness TECHNOLOGIST PROVIDED HISTORY: Reason for exam:->generalized weakness FINDINGS: Low lung volumes with cardiac prominence and vascular crowding versus central congestion without focal consolidation or significant effusion.  No pneumothorax     Low lung volumes with cardiac prominence and vascular crowding versus central congestion without focal consolidation or significant effusion.       Objective:   Vitals: /72   Pulse 91   Temp 96.8 °F (36 °C) (Infrared)   Resp 20   Ht 1.676 m (5' 6\")   Wt 66.5 kg (146 lb 8 oz)   SpO2 96%   BMI 23.65 kg/m²   General appearance: appears stated age   Skin:  No rashes or lesions  HEENT: Head: Normocephalic, no lesions, without obvious abnormality.  Neck: no adenopathy, no carotid bruit, no JVD, supple, symmetrical, trachea midline, and thyroid not enlarged, symmetric, no tenderness/mass/nodules  Lungs: clear to auscultation bilaterally  Heart: regular rate and rhythm, S1, S2 normal, no murmur, click, rub or gallop  Abdomen: soft, non-tender; bowel sounds normal; no masses,  no organomegaly  Extremities: extremities normal, atraumatic, no cyanosis or edema  Neurologic: Mental status: Alert, oriented, thought content appropriate involantary movements  +    Assessment:   Patient Active Problem List:     Malignant neoplasm of upper-inner quadrant of left breast in female, estrogen receptor positive (HCC)     Hearing problem of both ears     Vitamin D deficiency     Postoperative hypothyroidism     GERD (gastroesophageal reflux disease)     HTN (hypertension)     Rosebud's disease (HCC)     Acquired hypothyroidism     SVT (supraventricular tachycardia)     Hypercalcemia     Hyperthyroidism-- exogenous     Rosebud chorea (HCC)     Hyponatremia     Hypokalemia     Symptomatic anemia     Malignant neoplasm metastatic to bone (HCC)     Acute hyponatremia     Generalized weakness     Right lower lobe pulmonary nodule    Plan:     1-hyponatremia resolved   -Na 118 (7/9 approx 5 AM)  -urine Na 62 and Urine osm 421 favor SIADH possibly d/t malignancy (metastatic bone lesions on scans)       -Sodium 138 mmol/L  - She has corrected about 16 mmol/L over 48 hours which is a reasonable rate of correction  - She can continue on mild fluid restriction       2-hypokalemia resolved   -possibly d/t poor oral intake  -K 3.8 improved with supplementation  -trend BMP     3-acute metabolic acidosis resolved   -possibly in setting of starvation, though no anion gap  - CO2 improved 22  - Continue present care     4-acute on chronic anemia, unspecified  -Hb today 6.9 g/dL receiving blood tr   -workup per primary/GI             Cherie Saldaña MD

## 2025-07-12 NOTE — DISCHARGE INSTRUCTIONS
Your information:  Name: Marie Meehan  : 1950    Your instructions:    YOU ARE BEING DISCHARGED HOME. PLEASE MAKE AND KEEP YOUR FOLLOW UP APPOINTMENTS.    What to do after you leave the hospital:    Recommended diet: regular diet and 1500ml fluid restriction per day    Recommended activity: activity as tolerated    IF YOU EXPERIENCE ANY OF THE FOLLOWING SYMPTOMS, CHEST PAIN, SHORTNESS OF BREATH, COUGHING UP BLOOD OR BLOODY SPUTUM, STOMACH PAIN OR CRAMPING, DARK, TARRY STOOLS, LOSS OF APPETITE, GENERAL NOT FEELING WELL, SIGNS AND SYMPTOMS OF INFECTION LIKE FEVER AND OR CHILLS, PLEASE CALL DR MCINTYRE OR RETURN TO THE EMERGENCY ROOM.    The following personal items were collected during your admission and were returned to you:    Belongings  Dental Appliances: Uppers, Lowers  Vision - Corrective Lenses: Eyeglasses  Hearing Aid: None  Clothing: Pants, Shirt, Footwear  Jewelry: Ring, Watch  Body Piercings Removed: No  Electronic Devices: Cell Phone  Weapons (Notify Protective Services/Security): None  Other Valuables: Keys, Purse, At bedside  Home Medications: None  Valuables Given To: Patient  Provide Name(s) of Who Valuable(s) Were Given To: patient    Information obtained by:  By signing below, I understand that if any problems occur once I leave the hospital I am to contact Dr Mcintyre or go to emergency room.  I understand and acknowledge receipt of the instructions indicated above.

## 2025-07-12 NOTE — PROGRESS NOTES
Physical Therapy   Initial Evaluation/Plan of Care    Room #:  0424/0424-02  Patient Name: Marie Meehan  YOB: 1950  MRN: [id number]    Date of Service: 7/12/2025     Tentative placement recommendation: Acute Rehab  Equipment recommendation: Wheeled Walker    Above info relayed to RN    Evaluating Physical Therapist: Debbie Smyth, PT #[id number]      Specific Provider Orders/Date/Referring PT eval and treat  Start:  07/11/25 1545,   End:  07/11/25 1545,   ONE TIME,   Standing Count:  1 Occurrences,   R       Nidhi Ballard, DOProvider :     Admitting Diagnosis:   Acute hyponatremia [E87.1]  Hypokalemia [E87.6]  Hyponatremia [E87.1]  Generalized weakness [R53.1]  Symptomatic anemia [D64.9]    Pt is a 75 y.o. female adm 7/9/25 with generalized weakness x several days with inability to walk. Symptomatic anemia with HGB of 6.6 and need for 2 units of PRBCs and acute hyponatremia.  Surgery: none      Patient Active Problem List   Diagnosis    Malignant neoplasm of upper-inner quadrant of left breast in female, estrogen receptor positive (HCC)    Hearing problem of both ears    Vitamin D deficiency    Postoperative hypothyroidism    GERD (gastroesophageal reflux disease)    HTN (hypertension)    Concho's disease (HCC)    Acquired hypothyroidism    SVT (supraventricular tachycardia)    Hypercalcemia    Hyperthyroidism-- exogenous    Yosi chorea (HCC)    Hyponatremia    Hypokalemia    Symptomatic anemia    Malignant neoplasm metastatic to bone (HCC)    Acute hyponatremia    Generalized weakness    Right lower lobe pulmonary nodule        ASSESSMENT of Current Deficits Patient exhibits decreased strength, balance, and endurance impairing transfers, gait , gait distance, tolerance to activity, and stairs Pt with h/o Concho's Chorea with associated movement dysfunction. Pt does not normally use an assistive device but she feels a wheeled walker may be necessary due to LE weakness. She denies h/o

## 2025-07-12 NOTE — PLAN OF CARE
Problem: Skin/Tissue Integrity  Goal: Skin integrity remains intact  Description: 1.  Monitor for areas of redness and/or skin breakdown  2.  Assess vascular access sites hourly  3.  Every 4-6 hours minimum:  Change oxygen saturation probe site  4.  Every 4-6 hours:  If on nasal continuous positive airway pressure, respiratory therapy assess nares and determine need for appliance change or resting period  7/12/2025 1955 by Trevor Tee, RN  Outcome: Completed  7/12/2025 0851 by Ryan Yuen, RN  Outcome: Progressing     Problem: Safety - Adult  Goal: Free from fall injury  7/12/2025 1955 by Trevor Tee, RN  Outcome: Completed  7/12/2025 0851 by Ryan Yuen, RN  Outcome: Progressing      [id number] Detailed

## 2025-07-12 NOTE — DISCHARGE INSTR - DIET
Good nutrition is important when healing from an illness, injury, or surgery.  Follow any nutrition recommendations given to you during your hospital stay.   If you were given an oral nutrition supplement while in the hospital, continue to take this supplement at home.  You can take it with meals, in-between meals, and/or before bedtime. These supplements can be purchased at most local grocery stores, pharmacies, and chain [employer]-stores.   If you have any questions about your diet or nutrition, call the hospital and ask for the dietitian.    1500 cc fluid restriction    Regular diet

## 2025-07-12 NOTE — DISCHARGE SUMMARY
8600 [Urine:8600]  I/O this shift:  In: 346.7 [Blood:346.7]  Out: -         LABS:  Recent Labs     07/11/25  1406 07/11/25 2227 07/12/25  0552   * 134* 138   K 3.8 4.0 3.8    101 104   CO2 22 22 22   BUN 16 19 18   CREATININE 1.0 0.9 0.9   GLUCOSE 143* 107* 105*   CALCIUM 8.8 8.4* 8.7*       Recent Labs     07/10/25  2238 07/11/25  0537 07/11/25  1023 07/11/25  1406 07/11/25 2227 07/12/25  0552 07/12/25  1442   WBC 8.0 7.6  --  8.7 8.5 6.9  --    RBC 2.43* 2.23*  --  2.40* 2.31* 2.24*  --    HGB 7.3* 6.8*   < > 7.3* 7.1* 6.9* 8.6*   HCT 21.7* 20.1*   < > 21.6* 20.7* 20.4* 25.6*   MCV 89.3 90.1  --  90.0 89.6 91.1  --    MCH 30.0 30.5  --  30.4 30.7 30.8  --    MCHC 33.6 33.8  --  33.8 34.3 33.8  --    RDW 20.5* 20.9*  --  21.0* 20.9* 21.5*  --    PLT 79* 69*  --   --  67*  --   --    MPV 12.0 12.1*  --  12.0 11.7 Can not be calculated  --     < > = values in this interval not displayed.       Calcium:    Lab Results   Component Value Date/Time    CALCIUM 8.7 07/12/2025 05:52 AM     Magnesium:    Lab Results   Component Value Date/Time    MG 1.8 07/11/2025 05:37 AM     Phosphorus:    Lab Results   Component Value Date/Time    PHOS 3.1 06/19/2025 05:51 AM     PT/INR:    Lab Results   Component Value Date/Time    PROTIME 14.1 07/09/2025 12:03 PM    INR 1.3 07/09/2025 12:03 PM     U/A:    Lab Results   Component Value Date/Time    COLORU Yellow 07/09/2025 12:03 PM    PROTEINU NEGATIVE 07/09/2025 12:03 PM    PHUR 6.0 07/09/2025 12:03 PM    WBCUA 0 TO 5 07/09/2025 12:03 PM    RBCUA GREATER THAN 100 07/09/2025 12:03 PM    BACTERIA 2+ 07/09/2025 12:03 PM    LEUKOCYTESUR TRACE 07/09/2025 12:03 PM    UROBILINOGEN 1.0 07/09/2025 12:03 PM    BILIRUBINUR NEGATIVE 07/09/2025 12:03 PM    GLUCOSEU NEGATIVE 07/09/2025 12:03 PM    AMORPHOUS PRESENT 07/09/2025 12:03 PM     TSH:    Lab Results   Component Value Date/Time    TSH 0.07 06/18/2025 05:33 AM       FOLATE:    Lab Results   Component Value Date/Time    FOLATE 6.0  3:24 pm TECHNIQUE: CTA of the abdomen and pelvis was performed with the administration of intravenous contrast. Multiplanar reformatted images are provided for review. MIP images are provided for review. Automated exposure control, iterative reconstruction, and/or weight based adjustment of the mA/kV was utilized to reduce the radiation dose to as low as reasonably achievable. COMPARISON: 09/27/2019 HISTORY: ORDERING SYSTEM PROVIDED HISTORY: GI bleed/anemia TECHNOLOGIST PROVIDED HISTORY: Reason for exam:->GI bleed/anemia Additional Contrast?->1 FINDINGS: Lower Chest: Mild scarring within the lung bases.  May represent sequela prior pneumonia.  Heart is normal in size.  Coronary artery calcifications. Liver: Several hepatic cysts.  A hypoenhancing lesion within the medial aspect of the right hepatic dome measuring 1.7 x 1.2 cm superior to the hepatic veins, demonstrates delayed nodular enhancement, most likely a hemangioma. Gallbladder and biliary system: Unremarkable. Spleen: Unremarkable. Pancreas: Unremarkable. Adrenal glands: Unremarkable. : Small bilateral renal cortical cysts.  A 7 mm nonobstructive calculus at the lower pole of the left kidney.  No hydronephrosis.  Unremarkable urinary bladder. Postsurgical change of hysterectomy. GI/Bowel: Visualized lower esophagus is unremarkable. The stomach is unremarkable. Small bowel is unremarkable. Extensive severe sigmoid colonic diverticulosis without acute inflammation.  Large amount of fecal burden within the rectum.  Unremarkable appendix. Aorta and major vessels: Severe atherosclerotic calcification at the infrarenal abdominal aorta without aneurysm or significant stenosis. Mesentery and retroperitoneum: Unremarkable. Lymph nodes: Unremarkable. Ascites: None. Bones/Soft Tissues: Multiple sclerotic lesions throughout the visualized lower thoracic and lumbar vertebrae, suggestive metastatic osseous disease, new from prior exam.  No acute fracture or

## 2025-07-13 LAB
ABO/RH: NORMAL
ANTIBODY SCREEN: NEGATIVE
ARM BAND NUMBER: NORMAL
BLOOD BANK BLOOD PRODUCT EXPIRATION DATE: NORMAL
BLOOD BANK BLOOD PRODUCT EXPIRATION DATE: NORMAL
BLOOD BANK DISPENSE STATUS: NORMAL
BLOOD BANK DISPENSE STATUS: NORMAL
BLOOD BANK ISBT PRODUCT BLOOD TYPE: 5100
BLOOD BANK ISBT PRODUCT BLOOD TYPE: 5100
BLOOD BANK PRODUCT CODE: NORMAL
BLOOD BANK PRODUCT CODE: NORMAL
BLOOD BANK SAMPLE EXPIRATION: NORMAL
BLOOD BANK UNIT TYPE AND RH: NORMAL
BLOOD BANK UNIT TYPE AND RH: NORMAL
BPU ID: NORMAL
BPU ID: NORMAL
COMPONENT: NORMAL
COMPONENT: NORMAL
CROSSMATCH RESULT: NORMAL
CROSSMATCH RESULT: NORMAL
TRANSFUSION STATUS: NORMAL
TRANSFUSION STATUS: NORMAL
UNIT DIVISION: 0
UNIT DIVISION: 0
UNIT ISSUE DATE/TIME: NORMAL
UNIT ISSUE DATE/TIME: NORMAL

## 2025-07-13 NOTE — PROGRESS NOTES
Discharge order received, Patient updated. Patient states she \"does not have a ride, they said I could stay till morning\", Call out to daughter, message left for daughter to return call re: transportation.

## 2025-07-14 ENCOUNTER — CARE COORDINATION (OUTPATIENT)
Dept: CARE COORDINATION | Age: 75
End: 2025-07-14

## 2025-07-14 DIAGNOSIS — R53.1 GENERALIZED WEAKNESS: Primary | ICD-10-CM

## 2025-07-14 PROCEDURE — 1111F DSCHRG MED/CURRENT MED MERGE: CPT | Performed by: FAMILY MEDICINE

## 2025-07-14 NOTE — CARE COORDINATION
[phone]    9/2/2025 1:00 PM Martina Trevino DO Primary Care [phone]    12/19/2025 10:00 AM Martina Trevino DO Primary Care [phone]            Care Transition Nurse provided contact information.  Plan for follow-up call in 2-5 days based on severity of symptoms and risk factors.  Plan for next call: symptom management-PCP notes, received Arimidex? BMP repeat completed? [id number] and walker received?      Jaki Urrutia RN

## 2025-07-15 ENCOUNTER — OFFICE VISIT (OUTPATIENT)
Dept: PRIMARY CARE CLINIC | Age: 75
End: 2025-07-15

## 2025-07-15 VITALS
OXYGEN SATURATION: 96 % | WEIGHT: 145 LBS | DIASTOLIC BLOOD PRESSURE: 68 MMHG | BODY MASS INDEX: 23.3 KG/M2 | SYSTOLIC BLOOD PRESSURE: 130 MMHG | TEMPERATURE: 97.5 F | HEART RATE: 93 BPM | HEIGHT: 66 IN

## 2025-07-15 DIAGNOSIS — D64.9 SYMPTOMATIC ANEMIA: ICD-10-CM

## 2025-07-15 DIAGNOSIS — E87.1 HYPONATREMIA: ICD-10-CM

## 2025-07-15 DIAGNOSIS — Z09 HOSPITAL DISCHARGE FOLLOW-UP: Primary | ICD-10-CM

## 2025-07-15 LAB
ALBUMIN SERPL-MCNC: 3.6 G/DL (ref 3.5–4.7)
ALPHA1 GLOB SERPL ELPH-MCNC: 0.4 G/DL (ref 0.2–0.4)
ALPHA2 GLOB SERPL ELPH-MCNC: 0.7 G/DL (ref 0.5–1)
B-GLOBULIN SERPL ELPH-MCNC: 1.1 G/DL (ref 0.8–1.3)
GAMMA GLOB SERPL ELPH-MCNC: 0.8 G/DL (ref 0.7–1.6)
INTERPRETATION SERPL IFE-IMP: NORMAL
PATH REV: NORMAL
PATHOLOGIST: NORMAL
PROT PATTERN SERPL ELPH-IMP: NORMAL
PROT SERPL-MCNC: 6.5 G/DL (ref 6.4–8.3)

## 2025-07-15 NOTE — PROGRESS NOTES
100 MG Caps     levothyroxine 125 MCG tablet  Commonly known as: SYNTHROID  TAKE 1 TABLET BY MOUTH DAILY     loratadine 10 MG capsule  Commonly known as: CLARITIN     melatonin 3 MG Tabs tablet  Take 2 tablets by mouth nightly as needed (sleep)     vitamin B-12 250 MCG tablet  Commonly known as: CYANOCOBALAMIN     vitamin B-6 100 MG tablet  Commonly known as: PYRIDOXINE     vitamin D 50 MCG (2000 UT) Tabs tablet  Commonly known as: CHOLECALCIFEROL  Take 1 tablet by mouth daily     vitamin E 400 UNIT capsule               Medications marked \"taking\" at this time  Outpatient Medications Marked as Taking for the 7/15/25 encounter (Office Visit) with Martina Trevino DO   Medication Sig Dispense Refill    anastrozole (ARIMIDEX) 1 MG tablet Take 1 tablet by mouth daily 30 tablet 0    melatonin 3 MG TABS tablet Take 2 tablets by mouth nightly as needed (sleep)      levothyroxine (SYNTHROID) 125 MCG tablet TAKE 1 TABLET BY MOUTH DAILY 90 tablet 1    vitamin D3 (CHOLECALCIFEROL) 50 MCG (2000 UT) TABS tablet Take 1 tablet by mouth daily 30 tablet 2    acetaminophen (TYLENOL) 500 MG tablet Take 1 tablet by mouth every 6 hours as needed for Pain      vitamin E 400 UNIT capsule Take 1 capsule by mouth daily      loratadine (CLARITIN) 10 MG capsule Take 1 capsule by mouth daily      Coenzyme Q10 (COQ10) 100 MG CAPS Take 1 capsule by mouth daily      Pyridoxine HCl (VITAMIN B-6) 100 MG tablet Take 1 tablet by mouth daily      vitamin B-12 (CYANOCOBALAMIN) 250 MCG tablet Take 1 tablet by mouth daily      CALCIUM CARBONATE-VITAMIN D PO Take 1 tablet by mouth 2 times daily          Medications patient taking as of now reconciled against medications ordered at time of hospital discharge: Yes    Review of Systems as above    Objective:         Vitals:    07/15/25 0939   BP: 130/68   Pulse: 93   Temp: 97.5 °F (36.4 °C)   TempSrc: Temporal   SpO2: 96%   Weight: 65.8 kg (145 lb)   Height: 1.676 m (5' 6\")      Body mass index is 23.4

## 2025-07-17 LAB — CANCER AG27-29 SERPL-ACNC: 130 U/ML (ref 0–38)

## 2025-07-18 ENCOUNTER — CARE COORDINATION (OUTPATIENT)
Dept: CARE COORDINATION | Age: 75
End: 2025-07-18

## 2025-07-18 ENCOUNTER — TELEPHONE (OUTPATIENT)
Dept: PRIMARY CARE CLINIC | Age: 75
End: 2025-07-18

## 2025-07-18 DIAGNOSIS — C50.212 MALIGNANT NEOPLASM OF UPPER-INNER QUADRANT OF LEFT BREAST IN FEMALE, ESTROGEN RECEPTOR POSITIVE (HCC): Primary | ICD-10-CM

## 2025-07-18 DIAGNOSIS — C79.51 MALIGNANT NEOPLASM METASTATIC TO BONE (HCC): ICD-10-CM

## 2025-07-18 DIAGNOSIS — Z17.0 MALIGNANT NEOPLASM OF UPPER-INNER QUADRANT OF LEFT BREAST IN FEMALE, ESTROGEN RECEPTOR POSITIVE (HCC): Primary | ICD-10-CM

## 2025-07-18 LAB
C282Y HEMOCHROMATOSIS MUT: NEGATIVE
H63D HEMOCHROMATOSIS MUT: NEGATIVE
HEMOCHROMATOSIS MUTATION INT: NORMAL
HEMOCHROMATOSIS SPECIMEN: NORMAL
PTH RELATED PEPTIDE: 2.5 PMOL/L (ref 0–3.4)
S65C HEMOCHROMATOSIS MUT: NEGATIVE

## 2025-07-18 NOTE — CARE COORDINATION
Care Transitions Note    Follow Up Call     Patient Current Location:  Home: [address] [place] [id number]    Care Transition Nurse contacted the family, daughter Masha by telephone. Verified name and  as identifiers.    Additional needs identified to be addressed with provider   No needs identified                 Method of communication with provider: none.    Care Summary Note: Spoke to Masha for transitions call. Informed her that order for [facility] was faxed to [facility]. Provided [facility]'s direct line as [phone].    Informed that new order for walker will be requested to PCP as last order was from hospital. They will fax order to [facility] once PCP signs for it.    Plan of care updates since last contact:  Review of patient management of conditions/medications: Reviewed walker needs and [facility] referral        Advance Care Planning:   Does patient have an Advance Directive: reviewed and current.    Medication Review:  No changes since last call.     Remote Patient Monitoring:  Offered patient enrollment in the Remote Patient Monitoring (RPM) program for in-home monitoring: Yes, but did not enroll at this time: declined.    Assessments:  Care Transitions Subsequent and Final Call    Subsequent and Final Calls  Care Transitions Interventions  Other Interventions:              Follow Up Appointment:   Reviewed upcoming appointment(s). and HUSSAIN appointment attended as scheduled   Future Appointments         Provider Specialty Dept Phone    2025 11:15 AM Martina Trevino DO Primary Care [phone]    2025 1:00 PM Martina Trevino DO Primary Care [phone]    2025 10:00 AM Martina Trevino DO Primary Care [phone]            Care Transition Nurse provided contact information.  Plan for follow-up call in 6-10 days based on severity of symptoms and risk factors.  Plan for next call: symptom management-did she get walker and [facility] appt?     Jaki Urrutia RN

## 2025-07-18 NOTE — CARE COORDINATION
Care Transitions Note    Follow Up Call     Patient Current Location:  Home: [address] [place] [id number]    Care Transition Nurse contacted the family, daughter Masha by telephone. Verified name and  as identifiers.    Additional needs identified to be addressed with provider   Standard priority: needs appt with [facility], needs walker                 Method of communication with provider: phone. Left message with [facility], Called PCP for DME walker, order was sent to     Care Summary Note: Spoke to pt's daughter, has been calling [facility] for asap appt, unable to get them to call back. Writer called and left message as well.     Stated patient is c/o back pain, numb legs. Pt is able to ambulate on her own. Daughter was told to hold off on [facility] until pt needs it most. Stated pt is home alone since daughter is working full time.    Writer called PCP office and requested walker order recipient. Informed order was through hospitalist. Stated request for new order will be placed to PCP. Office will then fax order to [facility].    Plan of care updates since last contact:  Review of patient management of conditions/medications: Reviewed appt needs, symptoms. Left message with [facility], called PCP about DME       Advance Care Planning:   Does patient have an Advance Directive: reviewed and current.    Medication Review:  No changes since last call.     Remote Patient Monitoring:  Offered patient enrollment in the Remote Patient Monitoring (RPM) program for in-home monitoring: Yes, but did not enroll at this time: declined.    Assessments:  Care Transitions Subsequent and Final Call    Subsequent and Final Calls  Do you have any ongoing symptoms?: Yes  Onset of Patient-reported symptoms: Other  Patient-reported symptoms: Other, Pain  Interventions for patient-reported symptoms: Other  Have your medications changed?: No  Do you have any questions related to your medications?:

## 2025-07-18 NOTE — TELEPHONE ENCOUNTER
Pt called and needs a referral to the [facility]. Referral needs to go to the [place] office.  All new patient referral need sent there.  Fax No. [phone]    Please advise

## 2025-07-18 NOTE — TELEPHONE ENCOUNTER
Received call from Jaki transition nurse from [facility] regarding DME order for Walker-folding walker with wheels and seat that was ordered at discharge. Wants to know if you can place order. Needs order sent somewhere that will deliver.

## 2025-07-21 ENCOUNTER — TELEPHONE (OUTPATIENT)
Dept: PRIMARY CARE CLINIC | Age: 75
End: 2025-07-21

## 2025-07-21 NOTE — TELEPHONE ENCOUNTER
Faxed DME order for walker, demographics and 7/15 ov note to medical services. Fax confirmation received.

## 2025-07-24 ENCOUNTER — CARE COORDINATION (OUTPATIENT)
Dept: CARE COORDINATION | Age: 75
End: 2025-07-24

## 2025-07-24 ENCOUNTER — TELEPHONE (OUTPATIENT)
Dept: PRIMARY CARE CLINIC | Age: 75
End: 2025-07-24

## 2025-07-24 NOTE — TELEPHONE ENCOUNTER
Dorothy received call from Medical services regarding DME order for folding walker with wheels and seat. They do not carry that. Faxed DME order, demographics and 7/15 ov note to [facility]. Confirmation received,.

## 2025-07-24 NOTE — CARE COORDINATION
Care Transitions Note    Follow Up Call     Patient Current Location:  [place]    Care Transition Nurse contacted the family, daughter Masha by telephone. Verified name and  as identifiers.    Additional needs identified to be addressed with provider   Standard priority: needs FWW with seat faxed to another DME, [facility] unable to fill order                 Method of communication with provider: phone.    Care Summary Note: Spoke to patient's daughter Masha for transitions call. Stated patient has appt with [facility] on  as new patient. Pt has not received FWW with a seat yet. Noted PCP office faxed order to [facility]. Writer called [facility], informed they do not provide FWW as a stand alone order. Writer called PCP office and requested order be faxed to another DME provider near them. Informed request will be given to staff member that handles DME requests. Attempted to call daughter back, [facility] not set up.     Plan of care updates since last contact:  Review of patient management of conditions/medications: Needs FWW with seat, PCP to order. Has [facility] appt on .       Advance Care Planning:   Does patient have an Advance Directive: reviewed and current.    Medication Review:  No changes since last call.     Remote Patient Monitoring:  Offered patient enrollment in the Remote Patient Monitoring (RPM) program for in-home monitoring: Patient is not eligible for RPM program because: patient does not have qualifying diagnosis.    Assessments:  Care Transitions Subsequent and Final Call    Subsequent and Final Calls  Do you have any ongoing symptoms?: No  Have your medications changed?: No  Do you have any questions related to your medications?: No  Do you currently have any active services?: Yes  Are you currently active with any services?: Outpatient/Community Services  Identified Barriers: Other  Care Transitions Interventions  Other Interventions:              Follow Up Appointment:   Reviewed upcoming

## 2025-07-28 ENCOUNTER — HOSPITAL ENCOUNTER (INPATIENT)
Age: 75
LOS: 4 days | Discharge: HOME HEALTH | DRG: 394 | End: 2025-08-04
Attending: EMERGENCY MEDICINE | Admitting: STUDENT IN AN ORGANIZED HEALTH CARE EDUCATION/TRAINING PROGRAM
Payer: MEDICARE

## 2025-07-28 DIAGNOSIS — D64.9 ANEMIA REQUIRING TRANSFUSIONS: Primary | ICD-10-CM

## 2025-07-28 DIAGNOSIS — D64.9 ANEMIA, UNSPECIFIED TYPE: ICD-10-CM

## 2025-07-28 LAB
ALBUMIN SERPL-MCNC: 3.5 G/DL (ref 3.5–5.2)
ALP SERPL-CCNC: 236 U/L (ref 35–104)
ALT SERPL-CCNC: 20 U/L (ref 0–35)
ANION GAP SERPL CALCULATED.3IONS-SCNC: 12 MMOL/L (ref 7–16)
AST SERPL-CCNC: 45 U/L (ref 0–35)
BASOPHILS # BLD: 0 K/UL (ref 0–0.2)
BASOPHILS NFR BLD: 0 % (ref 0–2)
BILIRUB SERPL-MCNC: 1 MG/DL (ref 0–1.2)
BUN SERPL-MCNC: 46 MG/DL (ref 8–23)
CALCIUM SERPL-MCNC: 8 MG/DL (ref 8.8–10.2)
CHLORIDE SERPL-SCNC: 100 MMOL/L (ref 98–107)
CO2 SERPL-SCNC: 21 MMOL/L (ref 22–29)
CREAT SERPL-MCNC: 1.6 MG/DL (ref 0.5–1)
EOSINOPHIL # BLD: 0.21 K/UL (ref 0.05–0.5)
EOSINOPHILS RELATIVE PERCENT: 2 % (ref 0–6)
ERYTHROCYTE [DISTWIDTH] IN BLOOD BY AUTOMATED COUNT: 29.4 % (ref 11.5–15)
GFR, ESTIMATED: 33 ML/MIN/1.73M2
GLUCOSE SERPL-MCNC: 196 MG/DL (ref 74–99)
HCT VFR BLD AUTO: 14.4 % (ref 34–48)
HGB BLD-MCNC: 4.6 G/DL (ref 11.5–15.5)
LYMPHOCYTES NFR BLD: 2.12 K/UL (ref 1.5–4)
LYMPHOCYTES RELATIVE PERCENT: 20 % (ref 20–42)
MCH RBC QN AUTO: 31.9 PG (ref 26–35)
MCHC RBC AUTO-ENTMCNC: 31.9 G/DL (ref 32–34.5)
MCV RBC AUTO: 100 FL (ref 80–99.9)
METAMYELOCYTES ABSOLUTE COUNT: 0.11 K/UL (ref 0–0.12)
METAMYELOCYTES: 1 % (ref 0–1)
MONOCYTES NFR BLD: 0.64 K/UL (ref 0.1–0.95)
MONOCYTES NFR BLD: 6 % (ref 2–12)
MYELOCYTES ABSOLUTE COUNT: 0.11 K/UL
MYELOCYTES: 1 %
NEUTROPHILS NFR BLD: 70 % (ref 43–80)
NEUTS SEG NFR BLD: 7.42 K/UL (ref 1.8–7.3)
NUCLEATED RED BLOOD CELLS: 15 PER 100 WBC
PLATELET CONFIRMATION: NORMAL
PLATELET, FLUORESCENCE: 58 K/UL (ref 130–450)
PMV BLD AUTO: 12.7 FL (ref 7–12)
POTASSIUM SERPL-SCNC: 4.7 MMOL/L (ref 3.5–5.1)
PROT SERPL-MCNC: 6.3 G/DL (ref 6.4–8.3)
RBC # BLD AUTO: 1.44 M/UL (ref 3.5–5.5)
RBC # BLD: ABNORMAL 10*6/UL
SODIUM SERPL-SCNC: 132 MMOL/L (ref 136–145)
WBC OTHER # BLD: 10.6 K/UL (ref 4.5–11.5)

## 2025-07-28 PROCEDURE — 86901 BLOOD TYPING SEROLOGIC RH(D): CPT

## 2025-07-28 PROCEDURE — 85025 COMPLETE CBC W/AUTO DIFF WBC: CPT

## 2025-07-28 PROCEDURE — 86923 COMPATIBILITY TEST ELECTRIC: CPT

## 2025-07-28 PROCEDURE — 80053 COMPREHEN METABOLIC PANEL: CPT

## 2025-07-28 PROCEDURE — 6360000002 HC RX W HCPCS: Performed by: EMERGENCY MEDICINE

## 2025-07-28 PROCEDURE — G0378 HOSPITAL OBSERVATION PER HR: HCPCS

## 2025-07-28 PROCEDURE — 96375 TX/PRO/DX INJ NEW DRUG ADDON: CPT

## 2025-07-28 PROCEDURE — 86900 BLOOD TYPING SEROLOGIC ABO: CPT

## 2025-07-28 PROCEDURE — 30233N1 TRANSFUSION OF NONAUTOLOGOUS RED BLOOD CELLS INTO PERIPHERAL VEIN, PERCUTANEOUS APPROACH: ICD-10-PCS | Performed by: FAMILY MEDICINE

## 2025-07-28 PROCEDURE — 96374 THER/PROPH/DIAG INJ IV PUSH: CPT

## 2025-07-28 PROCEDURE — 99285 EMERGENCY DEPT VISIT HI MDM: CPT

## 2025-07-28 PROCEDURE — 2500000003 HC RX 250 WO HCPCS: Performed by: FAMILY MEDICINE

## 2025-07-28 PROCEDURE — 6370000000 HC RX 637 (ALT 250 FOR IP): Performed by: EMERGENCY MEDICINE

## 2025-07-28 PROCEDURE — 96365 THER/PROPH/DIAG IV INF INIT: CPT

## 2025-07-28 PROCEDURE — 36430 TRANSFUSION BLD/BLD COMPNT: CPT

## 2025-07-28 PROCEDURE — 86850 RBC ANTIBODY SCREEN: CPT

## 2025-07-28 PROCEDURE — 96366 THER/PROPH/DIAG IV INF ADDON: CPT

## 2025-07-28 PROCEDURE — 99223 1ST HOSP IP/OBS HIGH 75: CPT | Performed by: FAMILY MEDICINE

## 2025-07-28 PROCEDURE — P9016 RBC LEUKOCYTES REDUCED: HCPCS

## 2025-07-28 PROCEDURE — 6360000002 HC RX W HCPCS: Performed by: FAMILY MEDICINE

## 2025-07-28 RX ORDER — ACETAMINOPHEN 650 MG/1
650 SUPPOSITORY RECTAL EVERY 6 HOURS PRN
Status: DISCONTINUED | OUTPATIENT
Start: 2025-07-28 | End: 2025-08-04 | Stop reason: HOSPADM

## 2025-07-28 RX ORDER — POTASSIUM CHLORIDE 1500 MG/1
40 TABLET, EXTENDED RELEASE ORAL PRN
Status: DISCONTINUED | OUTPATIENT
Start: 2025-07-28 | End: 2025-08-04 | Stop reason: HOSPADM

## 2025-07-28 RX ORDER — ONDANSETRON 2 MG/ML
4 INJECTION INTRAMUSCULAR; INTRAVENOUS EVERY 6 HOURS PRN
Status: DISCONTINUED | OUTPATIENT
Start: 2025-07-28 | End: 2025-08-04 | Stop reason: HOSPADM

## 2025-07-28 RX ORDER — POLYETHYLENE GLYCOL 3350 17 G/17G
17 POWDER, FOR SOLUTION ORAL DAILY PRN
Status: DISCONTINUED | OUTPATIENT
Start: 2025-07-28 | End: 2025-08-04 | Stop reason: HOSPADM

## 2025-07-28 RX ORDER — ACETAMINOPHEN 325 MG/1
650 TABLET ORAL EVERY 6 HOURS PRN
Status: DISCONTINUED | OUTPATIENT
Start: 2025-07-28 | End: 2025-08-04 | Stop reason: HOSPADM

## 2025-07-28 RX ORDER — POTASSIUM CHLORIDE 7.45 MG/ML
10 INJECTION INTRAVENOUS PRN
Status: DISCONTINUED | OUTPATIENT
Start: 2025-07-28 | End: 2025-08-04 | Stop reason: HOSPADM

## 2025-07-28 RX ORDER — HYDROCODONE BITARTRATE AND ACETAMINOPHEN 5; 325 MG/1; MG/1
1 TABLET ORAL ONCE
Status: COMPLETED | OUTPATIENT
Start: 2025-07-28 | End: 2025-07-28

## 2025-07-28 RX ORDER — SODIUM CHLORIDE 0.9 % (FLUSH) 0.9 %
5-40 SYRINGE (ML) INJECTION PRN
Status: DISCONTINUED | OUTPATIENT
Start: 2025-07-28 | End: 2025-08-04 | Stop reason: HOSPADM

## 2025-07-28 RX ORDER — MAGNESIUM SULFATE IN WATER 40 MG/ML
2000 INJECTION, SOLUTION INTRAVENOUS PRN
Status: DISCONTINUED | OUTPATIENT
Start: 2025-07-28 | End: 2025-08-04 | Stop reason: HOSPADM

## 2025-07-28 RX ORDER — SODIUM CHLORIDE 0.9 % (FLUSH) 0.9 %
5-40 SYRINGE (ML) INJECTION EVERY 12 HOURS SCHEDULED
Status: DISCONTINUED | OUTPATIENT
Start: 2025-07-28 | End: 2025-08-04 | Stop reason: HOSPADM

## 2025-07-28 RX ORDER — CALCIUM GLUCONATE 20 MG/ML
1000 INJECTION, SOLUTION INTRAVENOUS ONCE
Status: COMPLETED | OUTPATIENT
Start: 2025-07-28 | End: 2025-07-28

## 2025-07-28 RX ORDER — ONDANSETRON 4 MG/1
4 TABLET, ORALLY DISINTEGRATING ORAL EVERY 8 HOURS PRN
Status: DISCONTINUED | OUTPATIENT
Start: 2025-07-28 | End: 2025-08-04 | Stop reason: HOSPADM

## 2025-07-28 RX ORDER — SODIUM CHLORIDE 9 MG/ML
INJECTION, SOLUTION INTRAVENOUS PRN
Status: DISCONTINUED | OUTPATIENT
Start: 2025-07-28 | End: 2025-08-04 | Stop reason: HOSPADM

## 2025-07-28 RX ORDER — SODIUM CHLORIDE, SODIUM LACTATE, POTASSIUM CHLORIDE, CALCIUM CHLORIDE 600; 310; 30; 20 MG/100ML; MG/100ML; MG/100ML; MG/100ML
INJECTION, SOLUTION INTRAVENOUS CONTINUOUS
Status: DISCONTINUED | OUTPATIENT
Start: 2025-07-28 | End: 2025-08-02

## 2025-07-28 RX ADMIN — PANTOPRAZOLE SODIUM 40 MG: 40 INJECTION, POWDER, FOR SOLUTION INTRAVENOUS at 18:50

## 2025-07-28 RX ADMIN — CALCIUM GLUCONATE 1000 MG: 20 INJECTION, SOLUTION INTRAVENOUS at 17:02

## 2025-07-28 RX ADMIN — HYDROCODONE BITARTRATE AND ACETAMINOPHEN 1 TABLET: 5; 325 TABLET ORAL at 15:47

## 2025-07-28 ASSESSMENT — PAIN SCALES - GENERAL: PAINLEVEL_OUTOF10: 10

## 2025-07-28 ASSESSMENT — PAIN DESCRIPTION - LOCATION: LOCATION: BACK

## 2025-07-28 ASSESSMENT — PAIN DESCRIPTION - DESCRIPTORS: DESCRIPTORS: ACHING

## 2025-07-29 PROBLEM — C50.919 CARCINOMA OF BREAST METASTATIC TO BONE (HCC): Status: ACTIVE | Noted: 2025-07-29

## 2025-07-29 PROBLEM — C79.51 CARCINOMA OF BREAST METASTATIC TO BONE (HCC): Status: ACTIVE | Noted: 2025-07-29

## 2025-07-29 LAB
ALBUMIN SERPL-MCNC: 3.3 G/DL (ref 3.5–5.2)
ALP SERPL-CCNC: 228 U/L (ref 35–104)
ALT SERPL-CCNC: 18 U/L (ref 0–35)
ANION GAP SERPL CALCULATED.3IONS-SCNC: 12 MMOL/L (ref 7–16)
AST SERPL-CCNC: 42 U/L (ref 0–35)
BASOPHILS # BLD: 0 K/UL (ref 0–0.2)
BASOPHILS NFR BLD: 0 % (ref 0–2)
BILIRUB SERPL-MCNC: 1.1 MG/DL (ref 0–1.2)
BUN SERPL-MCNC: 38 MG/DL (ref 8–23)
CALCIUM SERPL-MCNC: 8.1 MG/DL (ref 8.8–10.2)
CHLORIDE SERPL-SCNC: 102 MMOL/L (ref 98–107)
CO2 SERPL-SCNC: 20 MMOL/L (ref 22–29)
CREAT SERPL-MCNC: 1.5 MG/DL (ref 0.5–1)
DATE, STOOL #1: NORMAL
EOSINOPHIL # BLD: 0.07 K/UL (ref 0.05–0.5)
EOSINOPHILS RELATIVE PERCENT: 1 % (ref 0–6)
ERYTHROCYTE [DISTWIDTH] IN BLOOD BY AUTOMATED COUNT: 20 % (ref 11.5–15)
GFR, ESTIMATED: 35 ML/MIN/1.73M2
GLUCOSE SERPL-MCNC: 108 MG/DL (ref 74–99)
HCT VFR BLD AUTO: 24.9 % (ref 34–48)
HCT VFR BLD AUTO: 26.1 % (ref 34–48)
HCT VFR BLD AUTO: 26.5 % (ref 34–48)
HEMOCCULT SP1 STL QL: NEGATIVE
HGB BLD-MCNC: 8.3 G/DL (ref 11.5–15.5)
HGB BLD-MCNC: 8.7 G/DL (ref 11.5–15.5)
HGB BLD-MCNC: 8.8 G/DL (ref 11.5–15.5)
INR PPP: 1.1
LYMPHOCYTES NFR BLD: 1.67 K/UL (ref 1.5–4)
LYMPHOCYTES RELATIVE PERCENT: 20 % (ref 20–42)
MCH RBC QN AUTO: 30.7 PG (ref 26–35)
MCHC RBC AUTO-ENTMCNC: 33.2 G/DL (ref 32–34.5)
MCV RBC AUTO: 92.3 FL (ref 80–99.9)
METAMYELOCYTES ABSOLUTE COUNT: 0.36 K/UL (ref 0–0.12)
METAMYELOCYTES: 4 % (ref 0–1)
MONOCYTES NFR BLD: 0.36 K/UL (ref 0.1–0.95)
MONOCYTES NFR BLD: 4 % (ref 2–12)
MYELOCYTES ABSOLUTE COUNT: 0.29 K/UL
MYELOCYTES: 4 %
NEUTROPHILS NFR BLD: 67 % (ref 43–80)
NEUTS SEG NFR BLD: 5.53 K/UL (ref 1.8–7.3)
NUCLEATED RED BLOOD CELLS: 18 PER 100 WBC
PLATELET CONFIRMATION: NORMAL
PLATELET, FLUORESCENCE: 45 K/UL (ref 130–450)
PMV BLD AUTO: ABNORMAL FL (ref 7–12)
POTASSIUM SERPL-SCNC: 4.6 MMOL/L (ref 3.5–5.1)
PROT SERPL-MCNC: 5.9 G/DL (ref 6.4–8.3)
PROTHROMBIN TIME: 12 SEC (ref 9.3–12.4)
RBC # BLD AUTO: 2.87 M/UL (ref 3.5–5.5)
RBC # BLD: ABNORMAL 10*6/UL
SODIUM SERPL-SCNC: 134 MMOL/L (ref 136–145)
TIME, STOOL #1: NORMAL
WBC OTHER # BLD: 8.3 K/UL (ref 4.5–11.5)

## 2025-07-29 PROCEDURE — 6360000002 HC RX W HCPCS: Performed by: HOSPITALIST

## 2025-07-29 PROCEDURE — 85610 PROTHROMBIN TIME: CPT

## 2025-07-29 PROCEDURE — 2580000003 HC RX 258: Performed by: FAMILY MEDICINE

## 2025-07-29 PROCEDURE — 6360000002 HC RX W HCPCS: Performed by: FAMILY MEDICINE

## 2025-07-29 PROCEDURE — 80053 COMPREHEN METABOLIC PANEL: CPT

## 2025-07-29 PROCEDURE — 96376 TX/PRO/DX INJ SAME DRUG ADON: CPT

## 2025-07-29 PROCEDURE — 85014 HEMATOCRIT: CPT

## 2025-07-29 PROCEDURE — 2500000003 HC RX 250 WO HCPCS: Performed by: FAMILY MEDICINE

## 2025-07-29 PROCEDURE — 82272 OCCULT BLD FECES 1-3 TESTS: CPT

## 2025-07-29 PROCEDURE — 85025 COMPLETE CBC W/AUTO DIFF WBC: CPT

## 2025-07-29 PROCEDURE — 96375 TX/PRO/DX INJ NEW DRUG ADDON: CPT

## 2025-07-29 PROCEDURE — 96361 HYDRATE IV INFUSION ADD-ON: CPT

## 2025-07-29 PROCEDURE — 6370000000 HC RX 637 (ALT 250 FOR IP): Performed by: HOSPITALIST

## 2025-07-29 PROCEDURE — 85018 HEMOGLOBIN: CPT

## 2025-07-29 PROCEDURE — 36430 TRANSFUSION BLD/BLD COMPNT: CPT

## 2025-07-29 PROCEDURE — P9016 RBC LEUKOCYTES REDUCED: HCPCS

## 2025-07-29 PROCEDURE — 36415 COLL VENOUS BLD VENIPUNCTURE: CPT

## 2025-07-29 PROCEDURE — 2500000003 HC RX 250 WO HCPCS: Performed by: HOSPITALIST

## 2025-07-29 PROCEDURE — 6370000000 HC RX 637 (ALT 250 FOR IP): Performed by: FAMILY MEDICINE

## 2025-07-29 PROCEDURE — 99232 SBSQ HOSP IP/OBS MODERATE 35: CPT | Performed by: FAMILY MEDICINE

## 2025-07-29 RX ORDER — LEVOTHYROXINE SODIUM 125 UG/1
125 TABLET ORAL DAILY
Status: DISCONTINUED | OUTPATIENT
Start: 2025-07-29 | End: 2025-08-04 | Stop reason: HOSPADM

## 2025-07-29 RX ORDER — LANOLIN ALCOHOL/MO/W.PET/CERES
250 CREAM (GRAM) TOPICAL DAILY
Status: DISCONTINUED | OUTPATIENT
Start: 2025-07-29 | End: 2025-07-29 | Stop reason: ALTCHOICE

## 2025-07-29 RX ORDER — LANOLIN ALCOHOL/MO/W.PET/CERES
100 CREAM (GRAM) TOPICAL DAILY
Status: DISCONTINUED | OUTPATIENT
Start: 2025-07-29 | End: 2025-07-29 | Stop reason: ALTCHOICE

## 2025-07-29 RX ORDER — CETIRIZINE HYDROCHLORIDE 10 MG/1
10 TABLET ORAL DAILY
Status: DISCONTINUED | OUTPATIENT
Start: 2025-07-29 | End: 2025-08-04 | Stop reason: HOSPADM

## 2025-07-29 RX ORDER — TRAMADOL HYDROCHLORIDE 50 MG/1
100 TABLET ORAL EVERY 8 HOURS PRN
Status: DISCONTINUED | OUTPATIENT
Start: 2025-07-29 | End: 2025-08-02

## 2025-07-29 RX ORDER — CHOLECALCIFEROL (VITAMIN D3) 50 MCG
2000 TABLET ORAL DAILY
Status: DISCONTINUED | OUTPATIENT
Start: 2025-07-29 | End: 2025-07-29 | Stop reason: ALTCHOICE

## 2025-07-29 RX ORDER — LOSARTAN POTASSIUM 25 MG/1
25 TABLET ORAL DAILY
COMMUNITY

## 2025-07-29 RX ORDER — ACETAMINOPHEN 500 MG
500 TABLET ORAL EVERY 6 HOURS PRN
Status: DISCONTINUED | OUTPATIENT
Start: 2025-07-29 | End: 2025-07-30 | Stop reason: SDUPTHER

## 2025-07-29 RX ORDER — TRAMADOL HYDROCHLORIDE 50 MG/1
50 TABLET ORAL EVERY 6 HOURS PRN
Status: DISCONTINUED | OUTPATIENT
Start: 2025-07-29 | End: 2025-08-02

## 2025-07-29 RX ORDER — BISACODYL 5 MG/1
10 TABLET, DELAYED RELEASE ORAL ONCE
Status: COMPLETED | OUTPATIENT
Start: 2025-07-29 | End: 2025-07-29

## 2025-07-29 RX ORDER — VITAMIN E 268 MG
400 CAPSULE ORAL DAILY
Status: DISCONTINUED | OUTPATIENT
Start: 2025-07-29 | End: 2025-07-29 | Stop reason: ALTCHOICE

## 2025-07-29 RX ORDER — HYDROCHLOROTHIAZIDE 25 MG/1
25 TABLET ORAL DAILY
Status: ON HOLD | COMMUNITY
End: 2025-08-01 | Stop reason: HOSPADM

## 2025-07-29 RX ORDER — ANASTROZOLE 1 MG/1
1 TABLET ORAL DAILY
Status: DISCONTINUED | OUTPATIENT
Start: 2025-07-29 | End: 2025-08-04 | Stop reason: HOSPADM

## 2025-07-29 RX ADMIN — ACETAMINOPHEN 650 MG: 325 TABLET ORAL at 02:21

## 2025-07-29 RX ADMIN — TRAMADOL HYDROCHLORIDE 50 MG: 50 TABLET, COATED ORAL at 11:19

## 2025-07-29 RX ADMIN — Medication 6 MG: at 02:21

## 2025-07-29 RX ADMIN — SODIUM CHLORIDE, SODIUM LACTATE, POTASSIUM CHLORIDE, AND CALCIUM CHLORIDE: .6; .31; .03; .02 INJECTION, SOLUTION INTRAVENOUS at 05:56

## 2025-07-29 RX ADMIN — Medication 10 ML: at 08:25

## 2025-07-29 RX ADMIN — CETIRIZINE HYDROCHLORIDE 10 MG: 10 TABLET, FILM COATED ORAL at 08:25

## 2025-07-29 RX ADMIN — Medication 10 ML: at 20:41

## 2025-07-29 RX ADMIN — LEVOTHYROXINE SODIUM 125 MCG: 0.12 TABLET ORAL at 08:24

## 2025-07-29 RX ADMIN — SODIUM CHLORIDE, PRESERVATIVE FREE 40 MG: 5 INJECTION INTRAVENOUS at 20:40

## 2025-07-29 RX ADMIN — PANTOPRAZOLE SODIUM 40 MG: 40 INJECTION, POWDER, FOR SOLUTION INTRAVENOUS at 08:25

## 2025-07-29 RX ADMIN — SODIUM CHLORIDE, SODIUM LACTATE, POTASSIUM CHLORIDE, AND CALCIUM CHLORIDE: .6; .31; .03; .02 INJECTION, SOLUTION INTRAVENOUS at 15:53

## 2025-07-29 RX ADMIN — BISACODYL 10 MG: 5 TABLET, COATED ORAL at 11:21

## 2025-07-29 RX ADMIN — ANASTROZOLE 1 MG: 1 TABLET ORAL at 10:32

## 2025-07-29 ASSESSMENT — PAIN SCALES - GENERAL
PAINLEVEL_OUTOF10: 5
PAINLEVEL_OUTOF10: 2
PAINLEVEL_OUTOF10: 4
PAINLEVEL_OUTOF10: 3

## 2025-07-29 ASSESSMENT — PAIN DESCRIPTION - ORIENTATION: ORIENTATION: LEFT

## 2025-07-29 ASSESSMENT — PAIN DESCRIPTION - DESCRIPTORS
DESCRIPTORS: ACHING;SORE
DESCRIPTORS: ACHING;DISCOMFORT

## 2025-07-29 ASSESSMENT — PAIN DESCRIPTION - LOCATION
LOCATION: GENERALIZED
LOCATION: LEG;HIP

## 2025-07-29 ASSESSMENT — PAIN SCALES - WONG BAKER: WONGBAKER_NUMERICALRESPONSE: NO HURT

## 2025-07-30 ENCOUNTER — ANESTHESIA (OUTPATIENT)
Dept: ENDOSCOPY | Age: 75
End: 2025-07-30
Payer: MEDICARE

## 2025-07-30 ENCOUNTER — ANESTHESIA EVENT (OUTPATIENT)
Dept: ENDOSCOPY | Age: 75
End: 2025-07-30
Payer: MEDICARE

## 2025-07-30 LAB
ALBUMIN SERPL-MCNC: 2.9 G/DL (ref 3.5–5.2)
ALP SERPL-CCNC: 231 U/L (ref 35–104)
ALT SERPL-CCNC: 16 U/L (ref 0–35)
ANION GAP SERPL CALCULATED.3IONS-SCNC: 11 MMOL/L (ref 7–16)
AST SERPL-CCNC: 38 U/L (ref 0–35)
BASOPHILS # BLD: 0.04 K/UL (ref 0–0.2)
BASOPHILS NFR BLD: 1 % (ref 0–2)
BILIRUB SERPL-MCNC: 1.1 MG/DL (ref 0–1.2)
BUN SERPL-MCNC: 28 MG/DL (ref 8–23)
CALCIUM SERPL-MCNC: 8.4 MG/DL (ref 8.8–10.2)
CHLORIDE SERPL-SCNC: 105 MMOL/L (ref 98–107)
CO2 SERPL-SCNC: 19 MMOL/L (ref 22–29)
CREAT SERPL-MCNC: 1.5 MG/DL (ref 0.5–1)
EOSINOPHIL # BLD: 0.12 K/UL (ref 0.05–0.5)
EOSINOPHILS RELATIVE PERCENT: 2 % (ref 0–6)
ERYTHROCYTE [DISTWIDTH] IN BLOOD BY AUTOMATED COUNT: 21.8 % (ref 11.5–15)
GFR, ESTIMATED: 37 ML/MIN/1.73M2
GLUCOSE SERPL-MCNC: 111 MG/DL (ref 74–99)
HCT VFR BLD AUTO: 25.6 % (ref 34–48)
HGB BLD-MCNC: 8.3 G/DL (ref 11.5–15.5)
IMM GRANULOCYTES # BLD AUTO: 0.49 K/UL (ref 0–0.58)
IMM GRANULOCYTES NFR BLD: 7 % (ref 0–5)
LYMPHOCYTES NFR BLD: 1.91 K/UL (ref 1.5–4)
LYMPHOCYTES RELATIVE PERCENT: 25 % (ref 20–42)
MCH RBC QN AUTO: 30.5 PG (ref 26–35)
MCHC RBC AUTO-ENTMCNC: 32.4 G/DL (ref 32–34.5)
MCV RBC AUTO: 94.1 FL (ref 80–99.9)
MONOCYTES NFR BLD: 0.57 K/UL (ref 0.1–0.95)
MONOCYTES NFR BLD: 8 % (ref 2–12)
NEUTROPHILS NFR BLD: 59 % (ref 43–80)
NEUTS SEG NFR BLD: 4.46 K/UL (ref 1.8–7.3)
PLATELET CONFIRMATION: NORMAL
PLATELET ESTIMATE: ABNORMAL
PLATELET, FLUORESCENCE: 41 K/UL (ref 130–450)
PMV BLD AUTO: ABNORMAL FL (ref 7–12)
POTASSIUM SERPL-SCNC: 4.3 MMOL/L (ref 3.5–5.1)
PROT SERPL-MCNC: 5.4 G/DL (ref 6.4–8.3)
RBC # BLD AUTO: 2.72 M/UL (ref 3.5–5.5)
SODIUM SERPL-SCNC: 135 MMOL/L (ref 136–145)
WBC OTHER # BLD: 7.5 K/UL (ref 4.5–11.5)

## 2025-07-30 PROCEDURE — 6370000000 HC RX 637 (ALT 250 FOR IP): Performed by: ANESTHESIOLOGY

## 2025-07-30 PROCEDURE — 3700000000 HC ANESTHESIA ATTENDED CARE: Performed by: INTERNAL MEDICINE

## 2025-07-30 PROCEDURE — 0DJ08ZZ INSPECTION OF UPPER INTESTINAL TRACT, VIA NATURAL OR ARTIFICIAL OPENING ENDOSCOPIC: ICD-10-PCS | Performed by: INTERNAL MEDICINE

## 2025-07-30 PROCEDURE — 7100000011 HC PHASE II RECOVERY - ADDTL 15 MIN: Performed by: INTERNAL MEDICINE

## 2025-07-30 PROCEDURE — 3609017100 HC EGD: Performed by: INTERNAL MEDICINE

## 2025-07-30 PROCEDURE — 2500000003 HC RX 250 WO HCPCS: Performed by: FAMILY MEDICINE

## 2025-07-30 PROCEDURE — 3700000001 HC ADD 15 MINUTES (ANESTHESIA): Performed by: INTERNAL MEDICINE

## 2025-07-30 PROCEDURE — 6370000000 HC RX 637 (ALT 250 FOR IP): Performed by: FAMILY MEDICINE

## 2025-07-30 PROCEDURE — 85025 COMPLETE CBC W/AUTO DIFF WBC: CPT

## 2025-07-30 PROCEDURE — 96361 HYDRATE IV INFUSION ADD-ON: CPT

## 2025-07-30 PROCEDURE — 6360000002 HC RX W HCPCS: Performed by: NURSE ANESTHETIST, CERTIFIED REGISTERED

## 2025-07-30 PROCEDURE — 2580000003 HC RX 258: Performed by: FAMILY MEDICINE

## 2025-07-30 PROCEDURE — 6360000002 HC RX W HCPCS: Performed by: HOSPITALIST

## 2025-07-30 PROCEDURE — 2500000003 HC RX 250 WO HCPCS: Performed by: HOSPITALIST

## 2025-07-30 PROCEDURE — 99232 SBSQ HOSP IP/OBS MODERATE 35: CPT | Performed by: FAMILY MEDICINE

## 2025-07-30 PROCEDURE — 96376 TX/PRO/DX INJ SAME DRUG ADON: CPT

## 2025-07-30 PROCEDURE — 2709999900 HC NON-CHARGEABLE SUPPLY: Performed by: INTERNAL MEDICINE

## 2025-07-30 PROCEDURE — 80053 COMPREHEN METABOLIC PANEL: CPT

## 2025-07-30 PROCEDURE — 7100000010 HC PHASE II RECOVERY - FIRST 15 MIN: Performed by: INTERNAL MEDICINE

## 2025-07-30 RX ORDER — IPRATROPIUM BROMIDE AND ALBUTEROL SULFATE 2.5; .5 MG/3ML; MG/3ML
1 SOLUTION RESPIRATORY (INHALATION) ONCE
Status: COMPLETED | OUTPATIENT
Start: 2025-07-30 | End: 2025-07-30

## 2025-07-30 RX ORDER — PROPOFOL 10 MG/ML
INJECTION, EMULSION INTRAVENOUS
Status: DISCONTINUED | OUTPATIENT
Start: 2025-07-30 | End: 2025-07-30 | Stop reason: SDUPTHER

## 2025-07-30 RX ADMIN — ACETAMINOPHEN 650 MG: 325 TABLET ORAL at 04:31

## 2025-07-30 RX ADMIN — ACETAMINOPHEN 650 MG: 325 TABLET ORAL at 19:53

## 2025-07-30 RX ADMIN — SODIUM CHLORIDE, PRESERVATIVE FREE 40 MG: 5 INJECTION INTRAVENOUS at 19:53

## 2025-07-30 RX ADMIN — SODIUM CHLORIDE, PRESERVATIVE FREE 40 MG: 5 INJECTION INTRAVENOUS at 08:13

## 2025-07-30 RX ADMIN — SODIUM CHLORIDE, SODIUM LACTATE, POTASSIUM CHLORIDE, AND CALCIUM CHLORIDE: .6; .31; .03; .02 INJECTION, SOLUTION INTRAVENOUS at 02:20

## 2025-07-30 RX ADMIN — ANASTROZOLE 1 MG: 1 TABLET ORAL at 08:16

## 2025-07-30 RX ADMIN — CETIRIZINE HYDROCHLORIDE 10 MG: 10 TABLET, FILM COATED ORAL at 08:13

## 2025-07-30 RX ADMIN — SODIUM CHLORIDE, SODIUM LACTATE, POTASSIUM CHLORIDE, AND CALCIUM CHLORIDE: .6; .31; .03; .02 INJECTION, SOLUTION INTRAVENOUS at 15:56

## 2025-07-30 RX ADMIN — ACETAMINOPHEN 650 MG: 325 TABLET ORAL at 11:00

## 2025-07-30 RX ADMIN — Medication 6 MG: at 19:53

## 2025-07-30 RX ADMIN — IPRATROPIUM BROMIDE AND ALBUTEROL SULFATE 1 DOSE: .5; 2.5 SOLUTION RESPIRATORY (INHALATION) at 12:45

## 2025-07-30 RX ADMIN — PROPOFOL 100 MG: 10 INJECTION, EMULSION INTRAVENOUS at 12:21

## 2025-07-30 RX ADMIN — LEVOTHYROXINE SODIUM 125 MCG: 0.12 TABLET ORAL at 08:13

## 2025-07-30 RX ADMIN — Medication 10 ML: at 08:14

## 2025-07-30 RX ADMIN — PROPOFOL 25 MG: 10 INJECTION, EMULSION INTRAVENOUS at 12:23

## 2025-07-30 ASSESSMENT — PAIN DESCRIPTION - ORIENTATION
ORIENTATION: LEFT

## 2025-07-30 ASSESSMENT — PAIN DESCRIPTION - LOCATION
LOCATION: LEG;KNEE;HIP
LOCATION: ABDOMEN
LOCATION: LEG;HIP
LOCATION: GENERALIZED

## 2025-07-30 ASSESSMENT — PAIN SCALES - GENERAL
PAINLEVEL_OUTOF10: 4
PAINLEVEL_OUTOF10: 4
PAINLEVEL_OUTOF10: 6
PAINLEVEL_OUTOF10: 2
PAINLEVEL_OUTOF10: 5
PAINLEVEL_OUTOF10: 6

## 2025-07-30 ASSESSMENT — PAIN DESCRIPTION - DESCRIPTORS
DESCRIPTORS: THROBBING
DESCRIPTORS: ACHING;SORE
DESCRIPTORS: THROBBING

## 2025-07-30 NOTE — ANESTHESIA PRE PROCEDURE
Department of Anesthesiology  Preprocedure Note       Name:  Marie Meehan   Age:  75 y.o.  :  1950                                          MRN:  [id number]         Date:  2025      Surgeon: Surgeon(s):  Ayush Hernandez MD    Procedure: Procedure(s):  ESOPHAGOGASTRODUODENOSCOPY    Medications prior to admission:   Prior to Admission medications    Medication Sig Start Date End Date Taking? Authorizing Provider   hydroCHLOROthiazide (HYDRODIURIL) 25 MG tablet Take 1 tablet by mouth daily   Yes Jeanette Up MD   losartan (COZAAR) 25 MG tablet Take 1 tablet by mouth daily   Yes Jeanette Up MD   anastrozole (ARIMIDEX) 1 MG tablet Take 1 tablet by mouth daily 25  Yes Nidhi Ballard DO   melatonin 3 MG TABS tablet Take 2 tablets by mouth nightly as needed (sleep) 25  Yes Nidhi Ballard DO   levothyroxine (SYNTHROID) 125 MCG tablet TAKE 1 TABLET BY MOUTH DAILY 25  Yes Martina Trevino DO   acetaminophen (TYLENOL) 500 MG tablet Take 1 tablet by mouth every 6 hours as needed for Pain   Yes Jeanette Up MD   loratadine (CLARITIN) 10 MG capsule Take 1 capsule by mouth daily   Yes Jeanette Up MD   Coenzyme Q10 (COQ10) 100 MG CAPS Take 1 capsule by mouth daily   Yes Jeanette Up MD   vitamin D3 (CHOLECALCIFEROL) 50 MCG (2000 UT) TABS tablet Take 1 tablet by mouth daily 25   Michel Salinas MD   vitamin E 400 UNIT capsule Take 1 capsule by mouth daily    Jeanette Up MD   Pyridoxine HCl (VITAMIN B-6) 100 MG tablet Take 1 tablet by mouth daily    Jeanette Up MD   vitamin B-12 (CYANOCOBALAMIN) 250 MCG tablet Take 1 tablet by mouth daily    Jeanette Up MD   CALCIUM CARBONATE-VITAMIN D PO Take 1 tablet by mouth 2 times daily    Jeanette Up MD       Current medications:    Current Facility-Administered Medications   Medication Dose Route Frequency Provider Last Rate Last Admin    acetaminophen

## 2025-07-30 NOTE — ANESTHESIA POSTPROCEDURE EVALUATION
Department of Anesthesiology  Postprocedure Note    Patient: Marie Meehan  MRN: [id number]  YOB: 1950  Date of evaluation: 7/30/2025    Procedure Summary       Date: 07/30/25 Room / Location: [place] [address] / [facility]    Anesthesia Start: 1213 Anesthesia Stop: 1235    Procedure: ESOPHAGOGASTRODUODENOSCOPY Diagnosis:       Acute anemia      (Acute anemia [D64.9])    Surgeons: Ayush Hernandez MD Responsible Provider: Garett Lyle DO    Anesthesia Type: MAC ASA Status: 3            Anesthesia Type: MAC    Rl Phase I:      Rl Phase II: Rl Score: 10    Anesthesia Post Evaluation    Patient location during evaluation: PACU  Patient participation: complete - patient participated  Level of consciousness: awake and alert  Pain score: 0  Airway patency: patent  Nausea & Vomiting: no nausea and no vomiting  Cardiovascular status: blood pressure returned to baseline and hemodynamically stable  Respiratory status: acceptable  Hydration status: stable  Pain management: adequate    No notable events documented.

## 2025-07-31 LAB
ALBUMIN SERPL-MCNC: 3 G/DL (ref 3.5–5.2)
ALP SERPL-CCNC: 211 U/L (ref 35–104)
ALT SERPL-CCNC: 16 U/L (ref 0–35)
ANION GAP SERPL CALCULATED.3IONS-SCNC: 10 MMOL/L (ref 7–16)
AST SERPL-CCNC: 34 U/L (ref 0–35)
BASOPHILS # BLD: 0 K/UL (ref 0–0.2)
BASOPHILS NFR BLD: 0 % (ref 0–2)
BILIRUB SERPL-MCNC: 0.8 MG/DL (ref 0–1.2)
BUN SERPL-MCNC: 21 MG/DL (ref 8–23)
CALCIUM SERPL-MCNC: 8.2 MG/DL (ref 8.8–10.2)
CHLORIDE SERPL-SCNC: 105 MMOL/L (ref 98–107)
CO2 SERPL-SCNC: 22 MMOL/L (ref 22–29)
CREAT SERPL-MCNC: 1.4 MG/DL (ref 0.5–1)
EOSINOPHIL # BLD: 0.06 K/UL (ref 0.05–0.5)
EOSINOPHILS RELATIVE PERCENT: 1 % (ref 0–6)
ERYTHROCYTE [DISTWIDTH] IN BLOOD BY AUTOMATED COUNT: 22.4 % (ref 11.5–15)
GFR, ESTIMATED: 40 ML/MIN/1.73M2
GLUCOSE SERPL-MCNC: 122 MG/DL (ref 74–99)
HCT VFR BLD AUTO: 25 % (ref 34–48)
HGB BLD-MCNC: 8.1 G/DL (ref 11.5–15.5)
LYMPHOCYTES NFR BLD: 0.72 K/UL (ref 1.5–4)
LYMPHOCYTES RELATIVE PERCENT: 11 % (ref 20–42)
MCH RBC QN AUTO: 30.7 PG (ref 26–35)
MCHC RBC AUTO-ENTMCNC: 32.4 G/DL (ref 32–34.5)
MCV RBC AUTO: 94.7 FL (ref 80–99.9)
METAMYELOCYTES ABSOLUTE COUNT: 0.12 K/UL (ref 0–0.12)
METAMYELOCYTES: 2 % (ref 0–1)
MONOCYTES NFR BLD: 0.3 K/UL (ref 0.1–0.95)
MONOCYTES NFR BLD: 5 % (ref 2–12)
MYELOCYTES ABSOLUTE COUNT: 0.12 K/UL
MYELOCYTES: 2 %
NEUTROPHILS NFR BLD: 80 % (ref 43–80)
NEUTS SEG NFR BLD: 5.38 K/UL (ref 1.8–7.3)
NUCLEATED RED BLOOD CELLS: 20 PER 100 WBC
PLATELET CONFIRMATION: NORMAL
PLATELET, FLUORESCENCE: 39 K/UL (ref 130–450)
PMV BLD AUTO: ABNORMAL FL (ref 7–12)
POTASSIUM SERPL-SCNC: 4.4 MMOL/L (ref 3.5–5.1)
PROT SERPL-MCNC: 5.5 G/DL (ref 6.4–8.3)
RBC # BLD AUTO: 2.64 M/UL (ref 3.5–5.5)
RBC # BLD: ABNORMAL 10*6/UL
SODIUM SERPL-SCNC: 137 MMOL/L (ref 136–145)
WBC OTHER # BLD: 6.7 K/UL (ref 4.5–11.5)

## 2025-07-31 PROCEDURE — 6370000000 HC RX 637 (ALT 250 FOR IP): Performed by: HOSPITALIST

## 2025-07-31 PROCEDURE — 85025 COMPLETE CBC W/AUTO DIFF WBC: CPT

## 2025-07-31 PROCEDURE — 80053 COMPREHEN METABOLIC PANEL: CPT

## 2025-07-31 PROCEDURE — 6370000000 HC RX 637 (ALT 250 FOR IP): Performed by: FAMILY MEDICINE

## 2025-07-31 PROCEDURE — 2060000000 HC ICU INTERMEDIATE R&B

## 2025-07-31 PROCEDURE — 36415 COLL VENOUS BLD VENIPUNCTURE: CPT

## 2025-07-31 PROCEDURE — 6360000002 HC RX W HCPCS: Performed by: HOSPITALIST

## 2025-07-31 PROCEDURE — 2500000003 HC RX 250 WO HCPCS: Performed by: HOSPITALIST

## 2025-07-31 PROCEDURE — 2500000003 HC RX 250 WO HCPCS: Performed by: FAMILY MEDICINE

## 2025-07-31 PROCEDURE — 99232 SBSQ HOSP IP/OBS MODERATE 35: CPT | Performed by: STUDENT IN AN ORGANIZED HEALTH CARE EDUCATION/TRAINING PROGRAM

## 2025-07-31 PROCEDURE — 2580000003 HC RX 258: Performed by: FAMILY MEDICINE

## 2025-07-31 RX ORDER — BISACODYL 5 MG/1
10 TABLET, DELAYED RELEASE ORAL ONCE
Status: COMPLETED | OUTPATIENT
Start: 2025-07-31 | End: 2025-07-31

## 2025-07-31 RX ORDER — POLYETHYLENE GLYCOL 3350 17 G/17G
238 POWDER, FOR SOLUTION ORAL ONCE
Status: COMPLETED | OUTPATIENT
Start: 2025-07-31 | End: 2025-07-31

## 2025-07-31 RX ADMIN — BISACODYL 10 MG: 5 TABLET, COATED ORAL at 10:00

## 2025-07-31 RX ADMIN — Medication 10 ML: at 08:29

## 2025-07-31 RX ADMIN — POLYETHYLENE GLYCOL 3350 238 G: 17 POWDER, FOR SOLUTION ORAL at 17:31

## 2025-07-31 RX ADMIN — SODIUM CHLORIDE, PRESERVATIVE FREE 40 MG: 5 INJECTION INTRAVENOUS at 21:26

## 2025-07-31 RX ADMIN — SODIUM CHLORIDE, SODIUM LACTATE, POTASSIUM CHLORIDE, AND CALCIUM CHLORIDE: .6; .31; .03; .02 INJECTION, SOLUTION INTRAVENOUS at 14:58

## 2025-07-31 RX ADMIN — CETIRIZINE HYDROCHLORIDE 10 MG: 10 TABLET, FILM COATED ORAL at 08:30

## 2025-07-31 RX ADMIN — MAJOR MAGNESIUM CITRATE ORAL SOLUTION - LEMON 296 ML: 1.75 LIQUID ORAL at 10:00

## 2025-07-31 RX ADMIN — Medication 10 ML: at 21:26

## 2025-07-31 RX ADMIN — LEVOTHYROXINE SODIUM 125 MCG: 0.12 TABLET ORAL at 08:30

## 2025-07-31 RX ADMIN — Medication 6 MG: at 21:25

## 2025-07-31 RX ADMIN — SODIUM CHLORIDE, PRESERVATIVE FREE 40 MG: 5 INJECTION INTRAVENOUS at 08:30

## 2025-07-31 RX ADMIN — ANASTROZOLE 1 MG: 1 TABLET ORAL at 08:29

## 2025-07-31 RX ADMIN — ACETAMINOPHEN 650 MG: 325 TABLET ORAL at 21:24

## 2025-08-01 ENCOUNTER — ANESTHESIA (OUTPATIENT)
Dept: ENDOSCOPY | Age: 75
End: 2025-08-01
Payer: MEDICARE

## 2025-08-01 ENCOUNTER — ANESTHESIA EVENT (OUTPATIENT)
Dept: ENDOSCOPY | Age: 75
End: 2025-08-01
Payer: MEDICARE

## 2025-08-01 LAB
ABO/RH: NORMAL
ALBUMIN SERPL-MCNC: 3.2 G/DL (ref 3.5–5.2)
ALP SERPL-CCNC: 236 U/L (ref 35–104)
ALT SERPL-CCNC: 15 U/L (ref 0–35)
ANION GAP SERPL CALCULATED.3IONS-SCNC: 12 MMOL/L (ref 7–16)
ANTIBODY SCREEN: NEGATIVE
ARM BAND NUMBER: NORMAL
AST SERPL-CCNC: 41 U/L (ref 0–35)
BASOPHILS # BLD: 0 K/UL (ref 0–0.2)
BASOPHILS NFR BLD: 0 % (ref 0–2)
BILIRUB SERPL-MCNC: 1 MG/DL (ref 0–1.2)
BLOOD BANK BLOOD PRODUCT EXPIRATION DATE: NORMAL
BLOOD BANK DISPENSE STATUS: NORMAL
BLOOD BANK ISBT PRODUCT BLOOD TYPE: 5100
BLOOD BANK PRODUCT CODE: NORMAL
BLOOD BANK SAMPLE EXPIRATION: NORMAL
BLOOD BANK UNIT TYPE AND RH: NORMAL
BPU ID: NORMAL
BUN SERPL-MCNC: 14 MG/DL (ref 8–23)
CALCIUM SERPL-MCNC: 9 MG/DL (ref 8.8–10.2)
CHLORIDE SERPL-SCNC: 105 MMOL/L (ref 98–107)
CO2 SERPL-SCNC: 20 MMOL/L (ref 22–29)
COMPONENT: NORMAL
CREAT SERPL-MCNC: 1.1 MG/DL (ref 0.5–1)
CROSSMATCH RESULT: NORMAL
EOSINOPHIL # BLD: 0.44 K/UL (ref 0.05–0.5)
EOSINOPHILS RELATIVE PERCENT: 6 % (ref 0–6)
ERYTHROCYTE [DISTWIDTH] IN BLOOD BY AUTOMATED COUNT: 22.9 % (ref 11.5–15)
GFR, ESTIMATED: 54 ML/MIN/1.73M2
GLUCOSE SERPL-MCNC: 107 MG/DL (ref 74–99)
HCT VFR BLD AUTO: 30.4 % (ref 34–48)
HGB BLD-MCNC: 9.6 G/DL (ref 11.5–15.5)
LYMPHOCYTES NFR BLD: 1.46 K/UL (ref 1.5–4)
LYMPHOCYTES RELATIVE PERCENT: 20 % (ref 20–42)
MCH RBC QN AUTO: 31.2 PG (ref 26–35)
MCHC RBC AUTO-ENTMCNC: 31.6 G/DL (ref 32–34.5)
MCV RBC AUTO: 98.7 FL (ref 80–99.9)
MONOCYTES NFR BLD: 0.37 K/UL (ref 0.1–0.95)
MONOCYTES NFR BLD: 5 % (ref 2–12)
NEUTROPHILS NFR BLD: 69 % (ref 43–80)
NEUTS SEG NFR BLD: 5.04 K/UL (ref 1.8–7.3)
NUCLEATED RED BLOOD CELLS: 13 PER 100 WBC
PLATELET CONFIRMATION: NORMAL
PLATELET, FLUORESCENCE: 47 K/UL (ref 130–450)
PMV BLD AUTO: ABNORMAL FL (ref 7–12)
POTASSIUM SERPL-SCNC: 4.3 MMOL/L (ref 3.5–5.1)
PROT SERPL-MCNC: 6.1 G/DL (ref 6.4–8.3)
RBC # BLD AUTO: 3.08 M/UL (ref 3.5–5.5)
RBC # BLD: ABNORMAL 10*6/UL
SODIUM SERPL-SCNC: 137 MMOL/L (ref 136–145)
TRANSFUSION STATUS: NORMAL
UNIT DIVISION: 0
UNIT ISSUE DATE/TIME: NORMAL
WBC OTHER # BLD: 7.3 K/UL (ref 4.5–11.5)

## 2025-08-01 PROCEDURE — 88305 TISSUE EXAM BY PATHOLOGIST: CPT

## 2025-08-01 PROCEDURE — 6370000000 HC RX 637 (ALT 250 FOR IP): Performed by: FAMILY MEDICINE

## 2025-08-01 PROCEDURE — 2500000003 HC RX 250 WO HCPCS: Performed by: FAMILY MEDICINE

## 2025-08-01 PROCEDURE — 85025 COMPLETE CBC W/AUTO DIFF WBC: CPT

## 2025-08-01 PROCEDURE — 6370000000 HC RX 637 (ALT 250 FOR IP): Performed by: STUDENT IN AN ORGANIZED HEALTH CARE EDUCATION/TRAINING PROGRAM

## 2025-08-01 PROCEDURE — 6360000002 HC RX W HCPCS: Performed by: NURSE ANESTHETIST, CERTIFIED REGISTERED

## 2025-08-01 PROCEDURE — 2580000003 HC RX 258: Performed by: NURSE ANESTHETIST, CERTIFIED REGISTERED

## 2025-08-01 PROCEDURE — 2T015 HOSPITALIST 2ND TOUCH: CPT | Performed by: STUDENT IN AN ORGANIZED HEALTH CARE EDUCATION/TRAINING PROGRAM

## 2025-08-01 PROCEDURE — 2709999900 HC NON-CHARGEABLE SUPPLY: Performed by: INTERNAL MEDICINE

## 2025-08-01 PROCEDURE — 36415 COLL VENOUS BLD VENIPUNCTURE: CPT

## 2025-08-01 PROCEDURE — 3609010300 HC COLONOSCOPY W/BIOPSY SINGLE/MULTIPLE: Performed by: INTERNAL MEDICINE

## 2025-08-01 PROCEDURE — 97161 PT EVAL LOW COMPLEX 20 MIN: CPT | Performed by: PHYSICAL THERAPIST

## 2025-08-01 PROCEDURE — 2060000000 HC ICU INTERMEDIATE R&B

## 2025-08-01 PROCEDURE — 3700000000 HC ANESTHESIA ATTENDED CARE: Performed by: INTERNAL MEDICINE

## 2025-08-01 PROCEDURE — 99239 HOSP IP/OBS DSCHRG MGMT >30: CPT | Performed by: STUDENT IN AN ORGANIZED HEALTH CARE EDUCATION/TRAINING PROGRAM

## 2025-08-01 PROCEDURE — 7100000010 HC PHASE II RECOVERY - FIRST 15 MIN: Performed by: INTERNAL MEDICINE

## 2025-08-01 PROCEDURE — 6360000002 HC RX W HCPCS: Performed by: HOSPITALIST

## 2025-08-01 PROCEDURE — 3700000001 HC ADD 15 MINUTES (ANESTHESIA): Performed by: INTERNAL MEDICINE

## 2025-08-01 PROCEDURE — 0DBC8ZX EXCISION OF ILEOCECAL VALVE, VIA NATURAL OR ARTIFICIAL OPENING ENDOSCOPIC, DIAGNOSTIC: ICD-10-PCS | Performed by: INTERNAL MEDICINE

## 2025-08-01 PROCEDURE — 80053 COMPREHEN METABOLIC PANEL: CPT

## 2025-08-01 PROCEDURE — 2500000003 HC RX 250 WO HCPCS: Performed by: HOSPITALIST

## 2025-08-01 PROCEDURE — 2580000003 HC RX 258: Performed by: FAMILY MEDICINE

## 2025-08-01 PROCEDURE — 7100000011 HC PHASE II RECOVERY - ADDTL 15 MIN: Performed by: INTERNAL MEDICINE

## 2025-08-01 PROCEDURE — 97530 THERAPEUTIC ACTIVITIES: CPT | Performed by: PHYSICAL THERAPIST

## 2025-08-01 RX ORDER — PANTOPRAZOLE SODIUM 40 MG/1
40 TABLET, DELAYED RELEASE ORAL
Qty: 90 TABLET | Refills: 1 | Status: SHIPPED | OUTPATIENT
Start: 2025-08-01

## 2025-08-01 RX ORDER — SODIUM CHLORIDE, SODIUM LACTATE, POTASSIUM CHLORIDE, CALCIUM CHLORIDE 600; 310; 30; 20 MG/100ML; MG/100ML; MG/100ML; MG/100ML
INJECTION, SOLUTION INTRAVENOUS
Status: DISCONTINUED | OUTPATIENT
Start: 2025-08-01 | End: 2025-08-01 | Stop reason: SDUPTHER

## 2025-08-01 RX ORDER — PROPOFOL 10 MG/ML
INJECTION, EMULSION INTRAVENOUS
Status: DISCONTINUED | OUTPATIENT
Start: 2025-08-01 | End: 2025-08-01 | Stop reason: SDUPTHER

## 2025-08-01 RX ORDER — LOSARTAN POTASSIUM 50 MG/1
25 TABLET ORAL DAILY
Status: DISCONTINUED | OUTPATIENT
Start: 2025-08-01 | End: 2025-08-04 | Stop reason: HOSPADM

## 2025-08-01 RX ADMIN — ACETAMINOPHEN 650 MG: 325 TABLET ORAL at 17:05

## 2025-08-01 RX ADMIN — Medication 10 ML: at 08:41

## 2025-08-01 RX ADMIN — PROPOFOL 70 MG: 10 INJECTION, EMULSION INTRAVENOUS at 13:30

## 2025-08-01 RX ADMIN — PROPOFOL 20 MG: 10 INJECTION, EMULSION INTRAVENOUS at 13:39

## 2025-08-01 RX ADMIN — SODIUM CHLORIDE, SODIUM LACTATE, POTASSIUM CHLORIDE, AND CALCIUM CHLORIDE: .6; .31; .03; .02 INJECTION, SOLUTION INTRAVENOUS at 11:02

## 2025-08-01 RX ADMIN — LEVOTHYROXINE SODIUM 125 MCG: 0.12 TABLET ORAL at 06:08

## 2025-08-01 RX ADMIN — Medication 6 MG: at 21:31

## 2025-08-01 RX ADMIN — SODIUM CHLORIDE, PRESERVATIVE FREE 40 MG: 5 INJECTION INTRAVENOUS at 08:40

## 2025-08-01 RX ADMIN — SODIUM CHLORIDE, PRESERVATIVE FREE 40 MG: 5 INJECTION INTRAVENOUS at 23:45

## 2025-08-01 RX ADMIN — ACETAMINOPHEN 650 MG: 325 TABLET ORAL at 23:51

## 2025-08-01 RX ADMIN — PROPOFOL 20 MG: 10 INJECTION, EMULSION INTRAVENOUS at 13:33

## 2025-08-01 RX ADMIN — PROPOFOL 20 MG: 10 INJECTION, EMULSION INTRAVENOUS at 13:36

## 2025-08-01 RX ADMIN — LOSARTAN POTASSIUM 25 MG: 50 TABLET, FILM COATED ORAL at 17:01

## 2025-08-01 RX ADMIN — SODIUM CHLORIDE, SODIUM LACTATE, POTASSIUM CHLORIDE, AND CALCIUM CHLORIDE: .6; .31; .03; .02 INJECTION, SOLUTION INTRAVENOUS at 23:49

## 2025-08-01 RX ADMIN — SODIUM CHLORIDE, SODIUM LACTATE, POTASSIUM CHLORIDE, AND CALCIUM CHLORIDE: .6; .31; .03; .02 INJECTION, SOLUTION INTRAVENOUS at 00:47

## 2025-08-01 RX ADMIN — ANASTROZOLE 1 MG: 1 TABLET ORAL at 08:39

## 2025-08-01 RX ADMIN — CETIRIZINE HYDROCHLORIDE 10 MG: 10 TABLET, FILM COATED ORAL at 08:39

## 2025-08-01 RX ADMIN — SODIUM CHLORIDE, POTASSIUM CHLORIDE, SODIUM LACTATE AND CALCIUM CHLORIDE: 600; 310; 30; 20 INJECTION, SOLUTION INTRAVENOUS at 13:24

## 2025-08-01 ASSESSMENT — PAIN DESCRIPTION - LOCATION
LOCATION: BACK
LOCATION: BACK

## 2025-08-01 ASSESSMENT — LIFESTYLE VARIABLES: SMOKING_STATUS: 0

## 2025-08-01 ASSESSMENT — PAIN DESCRIPTION - ORIENTATION
ORIENTATION: MID;POSTERIOR
ORIENTATION: MID;POSTERIOR

## 2025-08-01 ASSESSMENT — PAIN DESCRIPTION - DESCRIPTORS
DESCRIPTORS: ACHING;DISCOMFORT
DESCRIPTORS: ACHING;DISCOMFORT

## 2025-08-01 ASSESSMENT — PAIN - FUNCTIONAL ASSESSMENT: PAIN_FUNCTIONAL_ASSESSMENT: PREVENTS OR INTERFERES SOME ACTIVE ACTIVITIES AND ADLS

## 2025-08-01 ASSESSMENT — PAIN SCALES - GENERAL
PAINLEVEL_OUTOF10: 0
PAINLEVEL_OUTOF10: 6
PAINLEVEL_OUTOF10: 0
PAINLEVEL_OUTOF10: 0
PAINLEVEL_OUTOF10: 5

## 2025-08-01 NOTE — ANESTHESIA PRE PROCEDURE
Department of Anesthesiology  Preprocedure Note       Name:  Marie Meehan   Age:  75 y.o.  :  1950                                          MRN:  [id number]         Date:  2025      Surgeon: Surgeon(s):  Brian Zendejas DO    Procedure: Procedure(s):  COLONOSCOPY DIAGNOSTIC    Medications prior to admission:   Prior to Admission medications    Medication Sig Start Date End Date Taking? Authorizing Provider   hydroCHLOROthiazide (HYDRODIURIL) 25 MG tablet Take 1 tablet by mouth daily   Yes Jeanette Up MD   losartan (COZAAR) 25 MG tablet Take 1 tablet by mouth daily   Yes Jeanette Up MD   anastrozole (ARIMIDEX) 1 MG tablet Take 1 tablet by mouth daily 25  Yes Nidhi Ballard DO   melatonin 3 MG TABS tablet Take 2 tablets by mouth nightly as needed (sleep) 25  Yes Nidhi Ballard DO   levothyroxine (SYNTHROID) 125 MCG tablet TAKE 1 TABLET BY MOUTH DAILY 25  Yes Martina Trevino DO   acetaminophen (TYLENOL) 500 MG tablet Take 1 tablet by mouth every 6 hours as needed for Pain   Yes Jeanette Up MD   loratadine (CLARITIN) 10 MG capsule Take 1 capsule by mouth daily   Yes Jeanette Up MD   Coenzyme Q10 (COQ10) 100 MG CAPS Take 1 capsule by mouth daily   Yes Jeanette Up MD   vitamin D3 (CHOLECALCIFEROL) 50 MCG ( UT) TABS tablet Take 1 tablet by mouth daily 25   Michel Salinas MD   vitamin E 400 UNIT capsule Take 1 capsule by mouth daily    Jeanette Up MD   Pyridoxine HCl (VITAMIN B-6) 100 MG tablet Take 1 tablet by mouth daily    Jeanette Up MD   vitamin B-12 (CYANOCOBALAMIN) 250 MCG tablet Take 1 tablet by mouth daily    Jeanette Up MD   CALCIUM CARBONATE-VITAMIN D PO Take 1 tablet by mouth 2 times daily    Jeanette Up MD       Current medications:    Current Facility-Administered Medications   Medication Dose Route Frequency Provider Last Rate Last Admin    anastrozole (ARIMIDEX)

## 2025-08-01 NOTE — ANESTHESIA POSTPROCEDURE EVALUATION
Department of Anesthesiology  Postprocedure Note    Patient: Marie Meehan  MRN: [id number]  YOB: 1950  Date of evaluation: 8/1/2025    Procedure Summary       Date: 08/01/25 Room / Location: [place] [address] / [facility]    Anesthesia Start: 1319 Anesthesia Stop: 1351    Procedure: COLONOSCOPY BIOPSY Diagnosis:       Anemia, unspecified type      (Anemia, unspecified type [D64.9])    Surgeons: Ayush Hernandez MD Responsible Provider: Garett Lyle DO    Anesthesia Type: MAC ASA Status: 3            Anesthesia Type: No value filed.    Rl Phase I:      Rl Phase II: Rl Score: 10    Anesthesia Post Evaluation    Patient location during evaluation: PACU  Patient participation: complete - patient participated  Level of consciousness: awake and alert  Pain score: 0  Airway patency: patent  Nausea & Vomiting: no nausea and no vomiting  Cardiovascular status: blood pressure returned to baseline and hemodynamically stable  Respiratory status: acceptable  Hydration status: stable  Pain management: adequate    No notable events documented.

## 2025-08-02 LAB
ALBUMIN SERPL-MCNC: 3.1 G/DL (ref 3.5–5.2)
ALP SERPL-CCNC: 229 U/L (ref 35–104)
ALT SERPL-CCNC: 17 U/L (ref 0–35)
ANION GAP SERPL CALCULATED.3IONS-SCNC: 12 MMOL/L (ref 7–16)
AST SERPL-CCNC: 45 U/L (ref 0–35)
BASOPHILS # BLD: 0.07 K/UL (ref 0–0.2)
BASOPHILS NFR BLD: 1 % (ref 0–2)
BILIRUB SERPL-MCNC: 1.1 MG/DL (ref 0–1.2)
BUN SERPL-MCNC: 14 MG/DL (ref 8–23)
CALCIUM SERPL-MCNC: 8.6 MG/DL (ref 8.8–10.2)
CHLORIDE SERPL-SCNC: 105 MMOL/L (ref 98–107)
CO2 SERPL-SCNC: 20 MMOL/L (ref 22–29)
CREAT SERPL-MCNC: 1 MG/DL (ref 0.5–1)
EOSINOPHIL # BLD: 0.21 K/UL (ref 0.05–0.5)
EOSINOPHILS RELATIVE PERCENT: 3 % (ref 0–6)
ERYTHROCYTE [DISTWIDTH] IN BLOOD BY AUTOMATED COUNT: 22.5 % (ref 11.5–15)
GFR, ESTIMATED: 61 ML/MIN/1.73M2
GLUCOSE SERPL-MCNC: 105 MG/DL (ref 74–99)
HCT VFR BLD AUTO: 27.1 % (ref 34–48)
HGB BLD-MCNC: 9 G/DL (ref 11.5–15.5)
LYMPHOCYTES NFR BLD: 1.47 K/UL (ref 1.5–4)
LYMPHOCYTES RELATIVE PERCENT: 21 % (ref 20–42)
MCH RBC QN AUTO: 31 PG (ref 26–35)
MCHC RBC AUTO-ENTMCNC: 33.2 G/DL (ref 32–34.5)
MCV RBC AUTO: 93.4 FL (ref 80–99.9)
METAMYELOCYTES ABSOLUTE COUNT: 0.07 K/UL (ref 0–0.12)
METAMYELOCYTES: 1 % (ref 0–1)
MONOCYTES NFR BLD: 0.42 K/UL (ref 0.1–0.95)
MONOCYTES NFR BLD: 6 % (ref 2–12)
MYELOCYTES ABSOLUTE COUNT: 0.28 K/UL
MYELOCYTES: 4 %
NEUTROPHILS NFR BLD: 64 % (ref 43–80)
NEUTS SEG NFR BLD: 4.48 K/UL (ref 1.8–7.3)
NUCLEATED RED BLOOD CELLS: 15 PER 100 WBC
PLATELET CONFIRMATION: NORMAL
PLATELET, FLUORESCENCE: 42 K/UL (ref 130–450)
PMV BLD AUTO: ABNORMAL FL (ref 7–12)
POTASSIUM SERPL-SCNC: 4.6 MMOL/L (ref 3.5–5.1)
PROT SERPL-MCNC: 5.9 G/DL (ref 6.4–8.3)
RBC # BLD AUTO: 2.9 M/UL (ref 3.5–5.5)
RBC # BLD: ABNORMAL 10*6/UL
SODIUM SERPL-SCNC: 138 MMOL/L (ref 136–145)
WBC OTHER # BLD: 7 K/UL (ref 4.5–11.5)

## 2025-08-02 PROCEDURE — 99232 SBSQ HOSP IP/OBS MODERATE 35: CPT | Performed by: STUDENT IN AN ORGANIZED HEALTH CARE EDUCATION/TRAINING PROGRAM

## 2025-08-02 PROCEDURE — 36415 COLL VENOUS BLD VENIPUNCTURE: CPT

## 2025-08-02 PROCEDURE — 85025 COMPLETE CBC W/AUTO DIFF WBC: CPT

## 2025-08-02 PROCEDURE — 6370000000 HC RX 637 (ALT 250 FOR IP): Performed by: FAMILY MEDICINE

## 2025-08-02 PROCEDURE — 6370000000 HC RX 637 (ALT 250 FOR IP): Performed by: INTERNAL MEDICINE

## 2025-08-02 PROCEDURE — 6370000000 HC RX 637 (ALT 250 FOR IP): Performed by: STUDENT IN AN ORGANIZED HEALTH CARE EDUCATION/TRAINING PROGRAM

## 2025-08-02 PROCEDURE — 80053 COMPREHEN METABOLIC PANEL: CPT

## 2025-08-02 PROCEDURE — 2060000000 HC ICU INTERMEDIATE R&B

## 2025-08-02 PROCEDURE — 2500000003 HC RX 250 WO HCPCS: Performed by: FAMILY MEDICINE

## 2025-08-02 PROCEDURE — 2700000000 HC OXYGEN THERAPY PER DAY

## 2025-08-02 PROCEDURE — 6360000002 HC RX W HCPCS: Performed by: HOSPITALIST

## 2025-08-02 PROCEDURE — 97530 THERAPEUTIC ACTIVITIES: CPT

## 2025-08-02 PROCEDURE — 2500000003 HC RX 250 WO HCPCS: Performed by: HOSPITALIST

## 2025-08-02 RX ORDER — FLUCONAZOLE 100 MG/1
100 TABLET ORAL DAILY
Qty: 14 TABLET | Refills: 0 | Status: SHIPPED | OUTPATIENT
Start: 2025-08-02 | End: 2025-08-16

## 2025-08-02 RX ORDER — HYDROCODONE BITARTRATE AND ACETAMINOPHEN 5; 325 MG/1; MG/1
1 TABLET ORAL EVERY 6 HOURS PRN
Status: DISCONTINUED | OUTPATIENT
Start: 2025-08-02 | End: 2025-08-04 | Stop reason: HOSPADM

## 2025-08-02 RX ORDER — FLUCONAZOLE 100 MG/1
100 TABLET ORAL DAILY
Status: DISCONTINUED | OUTPATIENT
Start: 2025-08-02 | End: 2025-08-04 | Stop reason: HOSPADM

## 2025-08-02 RX ADMIN — LOSARTAN POTASSIUM 25 MG: 50 TABLET, FILM COATED ORAL at 10:54

## 2025-08-02 RX ADMIN — Medication 6 MG: at 20:47

## 2025-08-02 RX ADMIN — SODIUM CHLORIDE, PRESERVATIVE FREE 40 MG: 5 INJECTION INTRAVENOUS at 20:48

## 2025-08-02 RX ADMIN — FLUCONAZOLE 100 MG: 100 TABLET ORAL at 11:16

## 2025-08-02 RX ADMIN — LEVOTHYROXINE SODIUM 125 MCG: 0.12 TABLET ORAL at 05:03

## 2025-08-02 RX ADMIN — SODIUM CHLORIDE, PRESERVATIVE FREE 40 MG: 5 INJECTION INTRAVENOUS at 10:54

## 2025-08-02 RX ADMIN — ANASTROZOLE 1 MG: 1 TABLET ORAL at 10:54

## 2025-08-02 RX ADMIN — Medication 10 ML: at 10:56

## 2025-08-02 RX ADMIN — CETIRIZINE HYDROCHLORIDE 10 MG: 10 TABLET, FILM COATED ORAL at 10:54

## 2025-08-02 ASSESSMENT — PAIN SCALES - GENERAL
PAINLEVEL_OUTOF10: 0
PAINLEVEL_OUTOF10: 0
PAINLEVEL_OUTOF10: 3

## 2025-08-03 LAB
ALBUMIN SERPL-MCNC: 3.3 G/DL (ref 3.5–5.2)
ALP SERPL-CCNC: 253 U/L (ref 35–104)
ALT SERPL-CCNC: 18 U/L (ref 0–35)
ANION GAP SERPL CALCULATED.3IONS-SCNC: 12 MMOL/L (ref 7–16)
AST SERPL-CCNC: 37 U/L (ref 0–35)
BASOPHILS # BLD: 0 K/UL (ref 0–0.2)
BASOPHILS NFR BLD: 0 % (ref 0–2)
BILIRUB SERPL-MCNC: 0.8 MG/DL (ref 0–1.2)
BUN SERPL-MCNC: 23 MG/DL (ref 8–23)
CALCIUM SERPL-MCNC: 8.8 MG/DL (ref 8.8–10.2)
CHLORIDE SERPL-SCNC: 104 MMOL/L (ref 98–107)
CO2 SERPL-SCNC: 21 MMOL/L (ref 22–29)
CREAT SERPL-MCNC: 0.9 MG/DL (ref 0.5–1)
EOSINOPHIL # BLD: 0.07 K/UL (ref 0.05–0.5)
EOSINOPHILS RELATIVE PERCENT: 1 % (ref 0–6)
ERYTHROCYTE [DISTWIDTH] IN BLOOD BY AUTOMATED COUNT: 22.5 % (ref 11.5–15)
GFR, ESTIMATED: 62 ML/MIN/1.73M2
GLUCOSE SERPL-MCNC: 121 MG/DL (ref 74–99)
HCT VFR BLD AUTO: 27.6 % (ref 34–48)
HGB BLD-MCNC: 8.6 G/DL (ref 11.5–15.5)
LYMPHOCYTES NFR BLD: 1.21 K/UL (ref 1.5–4)
LYMPHOCYTES RELATIVE PERCENT: 16 % (ref 20–42)
MCH RBC QN AUTO: 29.7 PG (ref 26–35)
MCHC RBC AUTO-ENTMCNC: 31.2 G/DL (ref 32–34.5)
MCV RBC AUTO: 95.2 FL (ref 80–99.9)
MONOCYTES NFR BLD: 0.87 K/UL (ref 0.1–0.95)
MONOCYTES NFR BLD: 12 % (ref 2–12)
NEUTROPHILS NFR BLD: 71 % (ref 43–80)
NEUTS SEG NFR BLD: 5.25 K/UL (ref 1.8–7.3)
NUCLEATED RED BLOOD CELLS: 10 PER 100 WBC
PLATELET CONFIRMATION: NORMAL
PLATELET, FLUORESCENCE: 46 K/UL (ref 130–450)
PMV BLD AUTO: ABNORMAL FL (ref 7–12)
POTASSIUM SERPL-SCNC: 3.8 MMOL/L (ref 3.5–5.1)
PROT SERPL-MCNC: 6 G/DL (ref 6.4–8.3)
RBC # BLD AUTO: 2.9 M/UL (ref 3.5–5.5)
RBC # BLD: ABNORMAL 10*6/UL
SODIUM SERPL-SCNC: 137 MMOL/L (ref 136–145)
WBC OTHER # BLD: 7.4 K/UL (ref 4.5–11.5)

## 2025-08-03 PROCEDURE — 85025 COMPLETE CBC W/AUTO DIFF WBC: CPT

## 2025-08-03 PROCEDURE — 80053 COMPREHEN METABOLIC PANEL: CPT

## 2025-08-03 PROCEDURE — 2500000003 HC RX 250 WO HCPCS: Performed by: FAMILY MEDICINE

## 2025-08-03 PROCEDURE — 6370000000 HC RX 637 (ALT 250 FOR IP): Performed by: STUDENT IN AN ORGANIZED HEALTH CARE EDUCATION/TRAINING PROGRAM

## 2025-08-03 PROCEDURE — 2060000000 HC ICU INTERMEDIATE R&B

## 2025-08-03 PROCEDURE — 6370000000 HC RX 637 (ALT 250 FOR IP): Performed by: FAMILY MEDICINE

## 2025-08-03 PROCEDURE — 6370000000 HC RX 637 (ALT 250 FOR IP): Performed by: INTERNAL MEDICINE

## 2025-08-03 PROCEDURE — 36415 COLL VENOUS BLD VENIPUNCTURE: CPT

## 2025-08-03 PROCEDURE — 99232 SBSQ HOSP IP/OBS MODERATE 35: CPT | Performed by: STUDENT IN AN ORGANIZED HEALTH CARE EDUCATION/TRAINING PROGRAM

## 2025-08-03 PROCEDURE — 97165 OT EVAL LOW COMPLEX 30 MIN: CPT

## 2025-08-03 PROCEDURE — 97530 THERAPEUTIC ACTIVITIES: CPT

## 2025-08-03 RX ORDER — PANTOPRAZOLE SODIUM 40 MG/1
40 TABLET, DELAYED RELEASE ORAL
Status: DISCONTINUED | OUTPATIENT
Start: 2025-08-03 | End: 2025-08-04 | Stop reason: HOSPADM

## 2025-08-03 RX ADMIN — Medication 6 MG: at 20:30

## 2025-08-03 RX ADMIN — Medication 5 ML: at 00:00

## 2025-08-03 RX ADMIN — ACETAMINOPHEN 650 MG: 325 TABLET ORAL at 09:03

## 2025-08-03 RX ADMIN — ACETAMINOPHEN 650 MG: 325 TABLET ORAL at 03:23

## 2025-08-03 RX ADMIN — PANTOPRAZOLE SODIUM 40 MG: 40 TABLET, DELAYED RELEASE ORAL at 08:55

## 2025-08-03 RX ADMIN — Medication 10 ML: at 08:56

## 2025-08-03 RX ADMIN — ANASTROZOLE 1 MG: 1 TABLET ORAL at 08:56

## 2025-08-03 RX ADMIN — CETIRIZINE HYDROCHLORIDE 10 MG: 10 TABLET, FILM COATED ORAL at 08:56

## 2025-08-03 RX ADMIN — ACETAMINOPHEN 650 MG: 325 TABLET ORAL at 20:42

## 2025-08-03 RX ADMIN — LOSARTAN POTASSIUM 25 MG: 50 TABLET, FILM COATED ORAL at 08:56

## 2025-08-03 RX ADMIN — FLUCONAZOLE 100 MG: 100 TABLET ORAL at 08:56

## 2025-08-03 RX ADMIN — LEVOTHYROXINE SODIUM 125 MCG: 0.12 TABLET ORAL at 08:56

## 2025-08-03 ASSESSMENT — PAIN SCALES - GENERAL
PAINLEVEL_OUTOF10: 3
PAINLEVEL_OUTOF10: 0
PAINLEVEL_OUTOF10: 7
PAINLEVEL_OUTOF10: 0
PAINLEVEL_OUTOF10: 0
PAINLEVEL_OUTOF10: 4

## 2025-08-03 ASSESSMENT — PAIN DESCRIPTION - DESCRIPTORS: DESCRIPTORS: ACHING;DISCOMFORT

## 2025-08-03 ASSESSMENT — PAIN DESCRIPTION - LOCATION: LOCATION: BACK

## 2025-08-03 ASSESSMENT — PAIN DESCRIPTION - ORIENTATION: ORIENTATION: LOWER

## 2025-08-03 ASSESSMENT — PAIN - FUNCTIONAL ASSESSMENT: PAIN_FUNCTIONAL_ASSESSMENT: PREVENTS OR INTERFERES SOME ACTIVE ACTIVITIES AND ADLS

## 2025-08-04 ENCOUNTER — CARE COORDINATION (OUTPATIENT)
Dept: CARE COORDINATION | Age: 75
End: 2025-08-04

## 2025-08-04 VITALS
SYSTOLIC BLOOD PRESSURE: 124 MMHG | HEIGHT: 66 IN | OXYGEN SATURATION: 97 % | DIASTOLIC BLOOD PRESSURE: 64 MMHG | TEMPERATURE: 98.4 F | RESPIRATION RATE: 16 BRPM | HEART RATE: 82 BPM | BODY MASS INDEX: 23.41 KG/M2

## 2025-08-04 LAB
ALBUMIN SERPL-MCNC: 3.5 G/DL (ref 3.5–5.2)
ALP SERPL-CCNC: 284 U/L (ref 35–104)
ALT SERPL-CCNC: 28 U/L (ref 0–35)
ANION GAP SERPL CALCULATED.3IONS-SCNC: 13 MMOL/L (ref 7–16)
AST SERPL-CCNC: 51 U/L (ref 0–35)
BASOPHILS # BLD: 0 K/UL (ref 0–0.2)
BASOPHILS NFR BLD: 0 % (ref 0–2)
BILIRUB SERPL-MCNC: 0.9 MG/DL (ref 0–1.2)
BUN SERPL-MCNC: 21 MG/DL (ref 8–23)
CALCIUM SERPL-MCNC: 9 MG/DL (ref 8.8–10.2)
CHLORIDE SERPL-SCNC: 106 MMOL/L (ref 98–107)
CO2 SERPL-SCNC: 20 MMOL/L (ref 22–29)
CREAT SERPL-MCNC: 0.8 MG/DL (ref 0.5–1)
EOSINOPHIL # BLD: 0.26 K/UL (ref 0.05–0.5)
EOSINOPHILS RELATIVE PERCENT: 4 % (ref 0–6)
ERYTHROCYTE [DISTWIDTH] IN BLOOD BY AUTOMATED COUNT: 23.1 % (ref 11.5–15)
GFR, ESTIMATED: 76 ML/MIN/1.73M2
GLUCOSE SERPL-MCNC: 116 MG/DL (ref 74–99)
HCT VFR BLD AUTO: 28.5 % (ref 34–48)
HGB BLD-MCNC: 9.1 G/DL (ref 11.5–15.5)
LYMPHOCYTES NFR BLD: 1.37 K/UL (ref 1.5–4)
LYMPHOCYTES RELATIVE PERCENT: 18 % (ref 20–42)
MCH RBC QN AUTO: 30.6 PG (ref 26–35)
MCHC RBC AUTO-ENTMCNC: 31.9 G/DL (ref 32–34.5)
MCV RBC AUTO: 96 FL (ref 80–99.9)
METAMYELOCYTES ABSOLUTE COUNT: 0.13 K/UL (ref 0–0.12)
METAMYELOCYTES: 2 % (ref 0–1)
MONOCYTES NFR BLD: 0.26 K/UL (ref 0.1–0.95)
MONOCYTES NFR BLD: 4 % (ref 2–12)
MYELOCYTES ABSOLUTE COUNT: 0.07 K/UL
MYELOCYTES: 1 %
NEUTROPHILS NFR BLD: 72 % (ref 43–80)
NEUTS SEG NFR BLD: 5.41 K/UL (ref 1.8–7.3)
NUCLEATED RED BLOOD CELLS: 8 PER 100 WBC
PLATELET CONFIRMATION: NORMAL
PLATELET, FLUORESCENCE: 57 K/UL (ref 130–450)
PMV BLD AUTO: ABNORMAL FL (ref 7–12)
POTASSIUM SERPL-SCNC: 4 MMOL/L (ref 3.5–5.1)
PROT SERPL-MCNC: 6.6 G/DL (ref 6.4–8.3)
RBC # BLD AUTO: 2.97 M/UL (ref 3.5–5.5)
RBC # BLD: ABNORMAL 10*6/UL
SODIUM SERPL-SCNC: 139 MMOL/L (ref 136–145)
WBC OTHER # BLD: 7.5 K/UL (ref 4.5–11.5)

## 2025-08-04 PROCEDURE — 36415 COLL VENOUS BLD VENIPUNCTURE: CPT

## 2025-08-04 PROCEDURE — 2700000000 HC OXYGEN THERAPY PER DAY

## 2025-08-04 PROCEDURE — 85025 COMPLETE CBC W/AUTO DIFF WBC: CPT

## 2025-08-04 PROCEDURE — 2500000003 HC RX 250 WO HCPCS: Performed by: FAMILY MEDICINE

## 2025-08-04 PROCEDURE — 6370000000 HC RX 637 (ALT 250 FOR IP): Performed by: INTERNAL MEDICINE

## 2025-08-04 PROCEDURE — 80053 COMPREHEN METABOLIC PANEL: CPT

## 2025-08-04 PROCEDURE — 6370000000 HC RX 637 (ALT 250 FOR IP): Performed by: FAMILY MEDICINE

## 2025-08-04 PROCEDURE — 97530 THERAPEUTIC ACTIVITIES: CPT

## 2025-08-04 PROCEDURE — 97110 THERAPEUTIC EXERCISES: CPT

## 2025-08-04 PROCEDURE — 6370000000 HC RX 637 (ALT 250 FOR IP): Performed by: STUDENT IN AN ORGANIZED HEALTH CARE EDUCATION/TRAINING PROGRAM

## 2025-08-04 RX ADMIN — ANASTROZOLE 1 MG: 1 TABLET ORAL at 09:30

## 2025-08-04 RX ADMIN — FLUCONAZOLE 100 MG: 100 TABLET ORAL at 09:30

## 2025-08-04 RX ADMIN — Medication 5 ML: at 00:00

## 2025-08-04 RX ADMIN — CETIRIZINE HYDROCHLORIDE 10 MG: 10 TABLET, FILM COATED ORAL at 09:30

## 2025-08-04 RX ADMIN — LOSARTAN POTASSIUM 25 MG: 50 TABLET, FILM COATED ORAL at 09:30

## 2025-08-04 RX ADMIN — ACETAMINOPHEN 650 MG: 325 TABLET ORAL at 04:56

## 2025-08-04 RX ADMIN — Medication 10 ML: at 09:32

## 2025-08-04 ASSESSMENT — PAIN DESCRIPTION - LOCATION: LOCATION: BACK

## 2025-08-04 ASSESSMENT — PAIN SCALES - GENERAL
PAINLEVEL_OUTOF10: 0
PAINLEVEL_OUTOF10: 8

## 2025-08-04 ASSESSMENT — PAIN DESCRIPTION - ORIENTATION: ORIENTATION: MID

## 2025-08-04 ASSESSMENT — PAIN DESCRIPTION - DESCRIPTORS: DESCRIPTORS: ACHING;DISCOMFORT

## 2025-08-06 LAB — SURGICAL PATHOLOGY REPORT: NORMAL

## 2025-08-08 ENCOUNTER — TELEPHONE (OUTPATIENT)
Dept: PRIMARY CARE CLINIC | Age: 75
End: 2025-08-08

## 2025-08-26 ENCOUNTER — OFFICE VISIT (OUTPATIENT)
Dept: PRIMARY CARE CLINIC | Age: 75
End: 2025-08-26

## 2025-08-26 ENCOUNTER — CARE COORDINATION (OUTPATIENT)
Dept: CARE COORDINATION | Age: 75
End: 2025-08-26

## 2025-08-26 VITALS
BODY MASS INDEX: 21.05 KG/M2 | HEART RATE: 80 BPM | SYSTOLIC BLOOD PRESSURE: 122 MMHG | DIASTOLIC BLOOD PRESSURE: 70 MMHG | OXYGEN SATURATION: 95 % | TEMPERATURE: 97.6 F | WEIGHT: 131 LBS | HEIGHT: 66 IN

## 2025-08-26 DIAGNOSIS — C50.919 CARCINOMA OF BREAST METASTATIC TO BONE, UNSPECIFIED LATERALITY (HCC): Primary | ICD-10-CM

## 2025-08-26 DIAGNOSIS — D63.8 ANEMIA OF CHRONIC DISEASE: ICD-10-CM

## 2025-08-26 DIAGNOSIS — C79.51 CARCINOMA OF BREAST METASTATIC TO BONE, UNSPECIFIED LATERALITY (HCC): Primary | ICD-10-CM

## 2025-08-26 DIAGNOSIS — D64.9 ANEMIA REQUIRING TRANSFUSIONS: ICD-10-CM

## 2025-09-02 ENCOUNTER — TELEPHONE (OUTPATIENT)
Dept: PRIMARY CARE CLINIC | Age: 75
End: 2025-09-02

## (undated) DEVICE — TIP SUCTION TRANSPAR RIB SURF STD BLB RIG  YANKAUER

## (undated) DEVICE — KIT BEDSIDE REVITAL OX 500ML

## (undated) DEVICE — BLOCK BITE 60FR RUBBER ADLT DENTAL

## (undated) DEVICE — MASK CAPNOGRAPHY AD W35IN DIA58IN SAMP LN L10FT O2 LN

## (undated) DEVICE — ELECTRODE ECG MONITORING AD CONDUCTIVE ADH MONITOR MEDTRCE

## (undated) DEVICE — BAG SPECIMEN BIOHAZARD 6IN X 9IN

## (undated) DEVICE — BLOCK BITE 60FR CAREGUARD

## (undated) DEVICE — SPONGE GZ W4XL4IN RAYON POLY FILL CVR W/ NONWOVEN FAB

## (undated) DEVICE — GRADUATE TRIANG MEASURE 1000ML BLK PRNT

## (undated) DEVICE — LUBRICANT EXAM 4OZ GREASELESS H2O SOL JELLY FRM A TRNSLUC

## (undated) DEVICE — WIPES SKIN CLOTH READYPREP 9 X 10.5 IN 2% CHLORHEX GLUCONATE CHG PREOP

## (undated) DEVICE — MANIFOLD SUCT 4 PRT 2 CANSTR FLTR DISP NEPTUNE 2

## (undated) DEVICE — TUBING SCTION CONN 1/4X10 RIB

## (undated) DEVICE — TOWEL SURG W17XL27IN BLU COT STD PREWASHED STERILE 6 PER PK

## (undated) DEVICE — SUPPLEMENT DIGESTIVE H2O SOL GI-EASE

## (undated) DEVICE — FORCEPS BX OVL CUP FEN DISPOSABLE CAP L 160CM RAD JAW 4

## (undated) DEVICE — ADAPTER AIR/WATER CHANNEL CLEANING OLYMPUS COMPATIBLE NS

## (undated) DEVICE — SPONGE GZ 4IN 4IN 4 PLY N WVN AVANT

## (undated) DEVICE — ADAPTER CLEANING PORPOISE CLEANING

## (undated) DEVICE — FORCEPS BX L240CM JAW DIA2.8MM L CAP W/ NDL MIC MESH TOOTH

## (undated) DEVICE — KIT CONN AIR H2O SUCT BX VLV AUX H2O JET DISP FOR OLY

## (undated) DEVICE — CONTAINER SPEC COLL 960ML POLYPR TRIANG GRAD INTAKE/OUTPUT

## (undated) DEVICE — Device